# Patient Record
Sex: MALE | Race: WHITE | Employment: OTHER | URBAN - METROPOLITAN AREA
[De-identification: names, ages, dates, MRNs, and addresses within clinical notes are randomized per-mention and may not be internally consistent; named-entity substitution may affect disease eponyms.]

---

## 2017-04-07 LAB
LEFT VENTRICULAR EJECTION FRACTION MODE: NORMAL
LV EF: 65 %

## 2017-04-28 PROBLEM — I49.5 SINUS NODE DYSFUNCTION (HCC): Status: ACTIVE | Noted: 2017-04-28

## 2017-11-20 PROBLEM — Z95.0 PACEMAKER: Status: ACTIVE | Noted: 2017-11-20

## 2018-04-03 ENCOUNTER — OFFICE VISIT (OUTPATIENT)
Dept: ENT CLINIC | Age: 58
End: 2018-04-03
Payer: MEDICARE

## 2018-04-03 VITALS
WEIGHT: 271.1 LBS | HEART RATE: 93 BPM | BODY MASS INDEX: 33 KG/M2 | SYSTOLIC BLOOD PRESSURE: 133 MMHG | DIASTOLIC BLOOD PRESSURE: 87 MMHG

## 2018-04-03 DIAGNOSIS — G43.019 INTRACTABLE MIGRAINE WITHOUT AURA AND WITHOUT STATUS MIGRAINOSUS: ICD-10-CM

## 2018-04-03 DIAGNOSIS — J30.1 CHRONIC SEASONAL ALLERGIC RHINITIS DUE TO POLLEN: Primary | ICD-10-CM

## 2018-04-03 PROCEDURE — G8417 CALC BMI ABV UP PARAM F/U: HCPCS | Performed by: OTOLARYNGOLOGY

## 2018-04-03 PROCEDURE — 99213 OFFICE O/P EST LOW 20 MIN: CPT | Performed by: OTOLARYNGOLOGY

## 2018-04-03 PROCEDURE — G8427 DOCREV CUR MEDS BY ELIG CLIN: HCPCS | Performed by: OTOLARYNGOLOGY

## 2018-04-03 PROCEDURE — 4004F PT TOBACCO SCREEN RCVD TLK: CPT | Performed by: OTOLARYNGOLOGY

## 2018-04-03 PROCEDURE — 3017F COLORECTAL CA SCREEN DOC REV: CPT | Performed by: OTOLARYNGOLOGY

## 2018-04-03 RX ORDER — MONTELUKAST SODIUM 10 MG/1
10 TABLET ORAL NIGHTLY
Qty: 30 TABLET | Refills: 5 | Status: SHIPPED | OUTPATIENT
Start: 2018-04-03 | End: 2018-07-20 | Stop reason: SDUPTHER

## 2018-04-03 RX ORDER — AZELASTINE 1 MG/ML
2 SPRAY, METERED NASAL DAILY
Qty: 1 BOTTLE | Refills: 3 | Status: SHIPPED | OUTPATIENT
Start: 2018-04-03 | End: 2018-07-31 | Stop reason: SDUPTHER

## 2018-04-14 ASSESSMENT — ENCOUNTER SYMPTOMS
COUGH: 0
DOUBLE VISION: 0
VOMITING: 0
SHORTNESS OF BREATH: 0
HEARTBURN: 0
BLURRED VISION: 0

## 2018-05-08 ENCOUNTER — OFFICE VISIT (OUTPATIENT)
Dept: CARDIOLOGY CLINIC | Age: 58
End: 2018-05-08
Payer: MEDICARE

## 2018-05-08 VITALS
SYSTOLIC BLOOD PRESSURE: 100 MMHG | RESPIRATION RATE: 16 BRPM | BODY MASS INDEX: 32.63 KG/M2 | HEART RATE: 79 BPM | WEIGHT: 268 LBS | DIASTOLIC BLOOD PRESSURE: 72 MMHG | HEIGHT: 76 IN

## 2018-05-08 DIAGNOSIS — I48.0 PAROXYSMAL ATRIAL FIBRILLATION (HCC): Chronic | ICD-10-CM

## 2018-05-08 DIAGNOSIS — Z95.810 S/P ICD (INTERNAL CARDIAC DEFIBRILLATOR) PROCEDURE: Primary | Chronic | ICD-10-CM

## 2018-05-08 DIAGNOSIS — I48.3 TYPICAL ATRIAL FLUTTER (HCC): ICD-10-CM

## 2018-05-08 PROCEDURE — G8417 CALC BMI ABV UP PARAM F/U: HCPCS | Performed by: INTERNAL MEDICINE

## 2018-05-08 PROCEDURE — 4004F PT TOBACCO SCREEN RCVD TLK: CPT | Performed by: INTERNAL MEDICINE

## 2018-05-08 PROCEDURE — 3017F COLORECTAL CA SCREEN DOC REV: CPT | Performed by: INTERNAL MEDICINE

## 2018-05-08 PROCEDURE — G8427 DOCREV CUR MEDS BY ELIG CLIN: HCPCS | Performed by: INTERNAL MEDICINE

## 2018-05-08 PROCEDURE — 99213 OFFICE O/P EST LOW 20 MIN: CPT | Performed by: INTERNAL MEDICINE

## 2018-05-08 PROCEDURE — 93000 ELECTROCARDIOGRAM COMPLETE: CPT | Performed by: INTERNAL MEDICINE

## 2018-05-15 ENCOUNTER — NURSE ONLY (OUTPATIENT)
Dept: NON INVASIVE DIAGNOSTICS | Age: 58
End: 2018-05-15
Payer: MEDICARE

## 2018-05-15 DIAGNOSIS — Z95.0 PACEMAKER: Primary | ICD-10-CM

## 2018-05-15 DIAGNOSIS — I49.5 SINUS NODE DYSFUNCTION (HCC): ICD-10-CM

## 2018-05-15 PROCEDURE — 93296 REM INTERROG EVL PM/IDS: CPT | Performed by: INTERNAL MEDICINE

## 2018-05-15 PROCEDURE — 93294 REM INTERROG EVL PM/LDLS PM: CPT | Performed by: INTERNAL MEDICINE

## 2018-05-22 ENCOUNTER — TELEPHONE (OUTPATIENT)
Dept: NON INVASIVE DIAGNOSTICS | Age: 58
End: 2018-05-22

## 2018-06-01 DIAGNOSIS — K21.9 GASTROESOPHAGEAL REFLUX DISEASE, ESOPHAGITIS PRESENCE NOT SPECIFIED: ICD-10-CM

## 2018-06-01 RX ORDER — FAMOTIDINE 20 MG/1
20 TABLET, FILM COATED ORAL 2 TIMES DAILY
Qty: 60 TABLET | Refills: 1 | Status: SHIPPED | OUTPATIENT
Start: 2018-06-01 | End: 2018-07-31 | Stop reason: SDUPTHER

## 2018-07-03 ENCOUNTER — HOSPITAL ENCOUNTER (OUTPATIENT)
Age: 58
Discharge: HOME | End: 2018-07-03
Payer: MEDICARE

## 2018-07-03 LAB
CHOLESTEROL, TOTAL: 153 MG/DL (ref 0–199)
HDLC SERPL-MCNC: 32 MG/DL
LDL CHOLESTEROL CALCULATED: 86 MG/DL (ref 0–99)
TRIGL SERPL-MCNC: 175 MG/DL (ref 0–149)
VLDLC SERPL CALC-MCNC: 35 MG/DL

## 2018-07-03 PROCEDURE — 36415 COLL VENOUS BLD VENIPUNCTURE: CPT

## 2018-07-03 PROCEDURE — 80061 LIPID PANEL: CPT

## 2018-07-19 DIAGNOSIS — I42.0 DILATED CARDIOMYOPATHY (HCC): ICD-10-CM

## 2018-07-20 DIAGNOSIS — E11.8 TYPE 2 DIABETES MELLITUS WITH COMPLICATION, WITHOUT LONG-TERM CURRENT USE OF INSULIN (HCC): Chronic | ICD-10-CM

## 2018-07-20 DIAGNOSIS — E78.5 DYSLIPIDEMIA: ICD-10-CM

## 2018-07-20 DIAGNOSIS — G43.019 INTRACTABLE MIGRAINE WITHOUT AURA AND WITHOUT STATUS MIGRAINOSUS: ICD-10-CM

## 2018-07-20 DIAGNOSIS — I42.0 DILATED CARDIOMYOPATHY (HCC): ICD-10-CM

## 2018-07-20 RX ORDER — SPIRONOLACTONE 25 MG/1
25 TABLET ORAL DAILY
Qty: 12 TABLET | Refills: 0 | Status: SHIPPED | OUTPATIENT
Start: 2018-07-20 | End: 2018-07-31 | Stop reason: SDUPTHER

## 2018-07-20 RX ORDER — ATORVASTATIN CALCIUM 40 MG/1
40 TABLET, FILM COATED ORAL NIGHTLY
Qty: 12 TABLET | Refills: 0 | Status: SHIPPED | OUTPATIENT
Start: 2018-07-20 | End: 2018-07-31 | Stop reason: SDUPTHER

## 2018-07-20 RX ORDER — TOPIRAMATE 100 MG/1
100 TABLET, FILM COATED ORAL DAILY
Qty: 12 TABLET | Refills: 0 | Status: SHIPPED | OUTPATIENT
Start: 2018-07-20 | End: 2018-07-31 | Stop reason: SDUPTHER

## 2018-07-20 RX ORDER — LISINOPRIL 10 MG/1
10 TABLET ORAL DAILY
Qty: 12 TABLET | Refills: 0 | Status: SHIPPED | OUTPATIENT
Start: 2018-07-20 | End: 2018-07-31 | Stop reason: SDUPTHER

## 2018-07-20 RX ORDER — CARVEDILOL 12.5 MG/1
12.5 TABLET ORAL 2 TIMES DAILY WITH MEALS
Qty: 180 TABLET | Refills: 3 | Status: SHIPPED | OUTPATIENT
Start: 2018-07-20 | End: 2019-06-13 | Stop reason: SDUPTHER

## 2018-07-20 RX ORDER — MONTELUKAST SODIUM 10 MG/1
10 TABLET ORAL NIGHTLY
Qty: 12 TABLET | Refills: 0 | Status: SHIPPED | OUTPATIENT
Start: 2018-07-20 | End: 2018-07-31 | Stop reason: SDUPTHER

## 2018-07-27 NOTE — PROGRESS NOTES
Electrophysiology Outpatient Follow-Up Note    Sony Andres  1960  Date of Service: 7/30/2018  Referring Provider/PCP: Melo Padilla MD  Cardiology: Pooja Kwok MD  Electrophysiologist: Anupama Wilson DO    Patient Active Problem List    Diagnosis Date Noted    Typical atrial flutter ([facility] 03/28/2012     Priority: High     Overview Note:     1. Chronic anticoagulation with Coumadin  2. LASHAUN 12/2011: + CAROLA thrombus  3. DCCV 2/14/2012 and 1/25/2013: Sinus rhythm  4. Successful RF ablation of typical, counter-clockwise atrial flutter 12/13/2013  5. ECHO 5/7/2014: LVE, CLVH, normal wall motion, stage I diastolic dysfunction, limited study  6. Successful RF ablation of typical, counter-clockwise atrial flutter 12/13/2013  7. ECHO 5/7/2014: LVE, CLVH, normal wall motion, stage I diastolic dysfunction, limited study      S/P ICD (internal cardiac defibrillator) procedure 10/27/2011     Priority: High     Overview Note:     1. Medtronic [place], [place], model C194906, serial [id number] implanted 10/27/11  2. Right atrial lead is a Medtronic, model O6331117, serial [id number]  3. Right ventricular lead is a Medtronic, model Y1590679, serial [id number]  4. Successful RF ablation of typical, counter-clockwise atrial flutter 12/13/2013  5. ECHO 5/7/2014: LVE, CLVH, normal wall motion, stage I diastolic dysfunction, limited study          Cardiomyopathy, nonischemic ([facility] 10/06/2011     Priority: High     Overview Note:     1. ECHO 8/22/2011: EF=30%, RADHA   2. ECHO 7/6/2011: EF=25%, severe LVE, mild MR  3. MPS 7/8/2011: Normal perfusion, reduced EF  4. Cath 7/20/2011: Normal coronaries, EF=20%  5. Successful RF ablation of typical, counter-clockwise atrial flutter 12/13/2013  6. ECHO 5/7/2014: LVE, CLVH, normal wall motion, stage I diastolic dysfunction, LVEF of 60%.       Obesity 01/09/2013     Priority: Medium     Class: Chronic    Chronic headaches 01/09/2013     Priority: Medium     Class: Chronic  History of substance abuse 10/06/2011     Priority: Low     Overview Note:     A.  Abstinence since 1980's  B. H/O significant alcohol consumption      Pacemaker 11/20/2017    Sinus node dysfunction ([facility] 04/28/2017    Paroxysmal atrial fibrillation ([facility] 10/15/2015    Vertebrobasilar TIAs 10/15/2015     Overview Note:     2012      Type 2 diabetes mellitus without complication, without long-term current use of insulin (HCC) 04/08/2015    Allergic rhinitis 11/05/2014    Family history of colon cancer 07/21/2014       Current Outpatient Prescriptions   Medication Sig Dispense Refill    carvedilol (COREG) 12.5 MG tablet Take 1 tablet by mouth 2 times daily (with meals) 180 tablet 3    topiramate (TOPAMAX) 100 MG tablet Take 1 tablet by mouth daily 12 tablet 0    spironolactone (ALDACTONE) 25 MG tablet Take 1 tablet by mouth daily 12 tablet 0    montelukast (SINGULAIR) 10 MG tablet Take 1 tablet by mouth nightly 12 tablet 0    metFORMIN (GLUCOPHAGE) 500 MG tablet Take 1 tablet by mouth 2 times daily (with meals) 24 tablet 0    lisinopril (PRINIVIL;ZESTRIL) 10 MG tablet Take 1 tablet by mouth daily 12 tablet 0    atorvastatin (LIPITOR) 40 MG tablet Take 1 tablet by mouth nightly 12 tablet 0    furosemide (LASIX) 20 MG tablet TAKE ONE TABLET BY MOUTH EVERY DAY 30 tablet 0    DIGOX 250 MCG tablet Take 1 tablet by mouth daily 30 tablet 0    famotidine (PEPCID) 20 MG tablet Take 1 tablet by mouth 2 times daily 60 tablet 1    azelastine (ASTELIN) 0.1 % nasal spray 2 sprays by Nasal route daily Use in each nostril as directed 1 Bottle 3    aspirin 81 MG tablet Take 1 tablet by mouth daily 30 tablet 5    Elastic Bandages & Supports (KNEE BRACE) MISC Soft neutral position right knee brace  Size to fit  Dx:  Knee pain 1 each 0    albuterol (PROVENTIL) (2.5 MG/3ML) 0.083% nebulizer solution Take 3 mLs by nebulization every 6 hours as needed for Wheezing 120 each 3    albuterol sulfate HFA (PROVENTIL HFA) ecchymosis and no rash noted. Extremity: No clubbing or cyanosis. No edema. Psychiatric: Normal mood and affect. Thought content normal.  Pacemaker site: Well healed, no sign of infection/erosion. Device Interrogation/Reprogramming (7/30/18)  Make/Model Medtronic [address] GUTIERREZ. DOI 4/26/17  Mode MVP-R 70/130 ppm.   P wave: 2.9 mV  Impedance: 342 ohms   Threshold: 0.5 V @ 0.4 ms  RV R wave: 8.8 mV  Impedance: 304 ohms   Threshold: 1.0 V @ 0.4 ms  Pacing: A: 86.2%  RV: 1.2%    Battery Voltage/Longevity:  3.02 V, 8.5 years    Arrhythmias: None. Overall device function is normal  All device programmable settings were evaluated per above and in the scanned document, along with iterative adjustments (capture thresholds) to assess and select the most appropriate final programming to provide for consistent delivery of the appropriate therapy and to verify function of the device. I have reviewed all progress notes & records from the time of the patient's last office visit up to present. Stress test 1/11/17 Janette Lloyd)  - no ischemia  - EF 50%    TTE(4/3/2017):   Transthoracic Echocardiography Report (TTE)     Demographics      Patient Name       Dada Toussaint     Gender              Male                     [facility]      Medical Record     [id number]         Room Number         Osbaldo Marrufo      Account #         [de-identified]       Procedure Date      04/03/2017      Corporate ID                        Ordering Physician Curtis Lerma DO      Accession Number   [id number]        Referring Physician Uan Wiseman DO      Date of Birth      1960       Sonographer         [facility]      Age                56 year(s)       Interpreting       Lupe Arriola MD                                       Physician                                          Any Other     Procedure    Type of Study      TTE procedure:Echo Limited Study.     Procedure Date  Date: 04/03/2017 Start: 10:03 AM    Study Location: Echo PM   ----------------------------------------------------------------     M-Mode/2D Measurements & Calculations      LV Diastolic      LV Systolic Dimension: 3.8 cm   Dimension: 5.6 cm LV Volume Diastolic: 252.8 ml   LV EM:05.8 %      LV Volume Systolic: 82.6 ml   LV PW Diastolic:  LV EDV/LV EDV Index: 100.1 VU/09   RV Diastolic   4.8 cm            ml/m^2LV ESV/LV ESV Index: 29.7    Dimension: 2.7 cm   LV PW Systolic:   EP/00RL/ m^2   1.4 cm            EF Calculated: 70.3 %   Septum Diastolic: LV Mass Index: 55 l/min*m^2        LA volume/Index: 60.1   0.7 cm                                               ml /73ZQ/B^1   Septum Systolic:                                     RA Area: 17.1 cm^2   1.4 cm            LVOT: 1.8 cm      LV Mass: 139.86 g     Doppler Measurements & Calculations      MV Peak E-Wave: 0.62 m/s   MV Peak A-Wave: 0.8 m/s   MV E/A Ratio: 0.77                                                    TR Velocity:2.09 m/s   MV Deceleration Time: 254 msec                TR Gradient:17.44 mmHg      MV E' Septal Velocity: 0.08 m/s   MV E' Lateral Velocity: 0.09 m/s      Impressions:     1). NICMP  1. ECHO 8/22/2011: EF=30%, RADHA   2. ECHO 7/6/2011: EF=25%, severe LVE, mild MR  3. MPS 7/8/2011: Normal perfusion, reduced EF  4. Cath 7/20/2011: Normal coronaries, EF=20%  5. Successful RF ablation of typical, counter-clockwise atrial flutter 12/13/2013  6. ECHO 5/7/2014: LVE, CLVH, normal wall motion, stage I diastolic dysfunction, LVEF of 60%. 7. Echo 4/2017: LVEF--65%    2). H/O paroxysmal atrial flutter  1. Chronic anticoagulation with Coumadin  2. LASHAUN 12/2011: + CAROLA thrombus  3. DCCV 2/14/2012 and 1/25/2013: Sinus rhythm  4. Successful RF ablation of typical, counter-clockwise atrial flutter 12/13/2013  5. ECHO 5/7/2014: LVE, CLVH, normal wall motion, stage I diastolic dysfunction, limited study  6. Successful RF ablation of typical, counter-clockwise atrial flutter 12/13/2013  7.  ECHO 5/7/2014: Lucas Conner,

## 2018-07-30 ENCOUNTER — OFFICE VISIT (OUTPATIENT)
Dept: NON INVASIVE DIAGNOSTICS | Age: 58
End: 2018-07-30
Payer: MEDICARE

## 2018-07-30 VITALS
BODY MASS INDEX: 32.15 KG/M2 | RESPIRATION RATE: 18 BRPM | WEIGHT: 264 LBS | HEIGHT: 76 IN | DIASTOLIC BLOOD PRESSURE: 74 MMHG | HEART RATE: 80 BPM | SYSTOLIC BLOOD PRESSURE: 104 MMHG

## 2018-07-30 DIAGNOSIS — Z95.0 PACEMAKER: Primary | ICD-10-CM

## 2018-07-30 PROCEDURE — 3017F COLORECTAL CA SCREEN DOC REV: CPT | Performed by: INTERNAL MEDICINE

## 2018-07-30 PROCEDURE — 99213 OFFICE O/P EST LOW 20 MIN: CPT | Performed by: INTERNAL MEDICINE

## 2018-07-30 PROCEDURE — 4004F PT TOBACCO SCREEN RCVD TLK: CPT | Performed by: INTERNAL MEDICINE

## 2018-07-30 PROCEDURE — 93280 PM DEVICE PROGR EVAL DUAL: CPT | Performed by: INTERNAL MEDICINE

## 2018-07-30 PROCEDURE — G8427 DOCREV CUR MEDS BY ELIG CLIN: HCPCS | Performed by: INTERNAL MEDICINE

## 2018-07-30 PROCEDURE — G8417 CALC BMI ABV UP PARAM F/U: HCPCS | Performed by: INTERNAL MEDICINE

## 2018-07-30 ASSESSMENT — ENCOUNTER SYMPTOMS
SHORTNESS OF BREATH: 0
WHEEZING: 0
COUGH: 0
ORTHOPNEA: 0

## 2018-07-31 ENCOUNTER — OFFICE VISIT (OUTPATIENT)
Dept: INTERNAL MEDICINE | Age: 58
End: 2018-07-31
Payer: MEDICARE

## 2018-07-31 VITALS
HEIGHT: 76 IN | TEMPERATURE: 98.2 F | HEART RATE: 79 BPM | RESPIRATION RATE: 16 BRPM | BODY MASS INDEX: 32.06 KG/M2 | DIASTOLIC BLOOD PRESSURE: 85 MMHG | WEIGHT: 263.3 LBS | SYSTOLIC BLOOD PRESSURE: 128 MMHG

## 2018-07-31 DIAGNOSIS — J40 BRONCHITIS: ICD-10-CM

## 2018-07-31 DIAGNOSIS — E11.9 TYPE 2 DIABETES MELLITUS WITHOUT COMPLICATION, WITHOUT LONG-TERM CURRENT USE OF INSULIN (HCC): ICD-10-CM

## 2018-07-31 DIAGNOSIS — E78.5 DYSLIPIDEMIA: ICD-10-CM

## 2018-07-31 DIAGNOSIS — E11.8 TYPE 2 DIABETES MELLITUS WITH COMPLICATION, WITHOUT LONG-TERM CURRENT USE OF INSULIN (HCC): Chronic | ICD-10-CM

## 2018-07-31 DIAGNOSIS — G43.019 INTRACTABLE MIGRAINE WITHOUT AURA AND WITHOUT STATUS MIGRAINOSUS: ICD-10-CM

## 2018-07-31 DIAGNOSIS — K21.9 GASTROESOPHAGEAL REFLUX DISEASE, ESOPHAGITIS PRESENCE NOT SPECIFIED: ICD-10-CM

## 2018-07-31 DIAGNOSIS — Z23 NEED FOR PROPHYLACTIC VACCINATION AGAINST DIPHTHERIA-TETANUS-PERTUSSIS (DTP): ICD-10-CM

## 2018-07-31 DIAGNOSIS — I42.0 DILATED CARDIOMYOPATHY (HCC): ICD-10-CM

## 2018-07-31 DIAGNOSIS — Z23 NEED FOR PROPHYLACTIC VACCINATION AND INOCULATION AGAINST VARICELLA: ICD-10-CM

## 2018-07-31 LAB — HBA1C MFR BLD: 6.5 %

## 2018-07-31 PROCEDURE — G8427 DOCREV CUR MEDS BY ELIG CLIN: HCPCS | Performed by: STUDENT IN AN ORGANIZED HEALTH CARE EDUCATION/TRAINING PROGRAM

## 2018-07-31 PROCEDURE — 2022F DILAT RTA XM EVC RTNOPTHY: CPT | Performed by: STUDENT IN AN ORGANIZED HEALTH CARE EDUCATION/TRAINING PROGRAM

## 2018-07-31 PROCEDURE — 83036 HEMOGLOBIN GLYCOSYLATED A1C: CPT | Performed by: STUDENT IN AN ORGANIZED HEALTH CARE EDUCATION/TRAINING PROGRAM

## 2018-07-31 PROCEDURE — 99212 OFFICE O/P EST SF 10 MIN: CPT | Performed by: STUDENT IN AN ORGANIZED HEALTH CARE EDUCATION/TRAINING PROGRAM

## 2018-07-31 PROCEDURE — G8417 CALC BMI ABV UP PARAM F/U: HCPCS | Performed by: STUDENT IN AN ORGANIZED HEALTH CARE EDUCATION/TRAINING PROGRAM

## 2018-07-31 PROCEDURE — 4004F PT TOBACCO SCREEN RCVD TLK: CPT | Performed by: STUDENT IN AN ORGANIZED HEALTH CARE EDUCATION/TRAINING PROGRAM

## 2018-07-31 PROCEDURE — 99213 OFFICE O/P EST LOW 20 MIN: CPT | Performed by: STUDENT IN AN ORGANIZED HEALTH CARE EDUCATION/TRAINING PROGRAM

## 2018-07-31 PROCEDURE — 3017F COLORECTAL CA SCREEN DOC REV: CPT | Performed by: STUDENT IN AN ORGANIZED HEALTH CARE EDUCATION/TRAINING PROGRAM

## 2018-07-31 PROCEDURE — 3044F HG A1C LEVEL LT 7.0%: CPT | Performed by: STUDENT IN AN ORGANIZED HEALTH CARE EDUCATION/TRAINING PROGRAM

## 2018-07-31 RX ORDER — FUROSEMIDE 20 MG/1
20 TABLET ORAL DAILY
Qty: 30 TABLET | Refills: 0 | Status: SHIPPED | OUTPATIENT
Start: 2018-07-31 | End: 2018-09-26 | Stop reason: SDUPTHER

## 2018-07-31 RX ORDER — FAMOTIDINE 20 MG/1
20 TABLET, FILM COATED ORAL 2 TIMES DAILY
Qty: 60 TABLET | Refills: 5 | Status: SHIPPED | OUTPATIENT
Start: 2018-07-31 | End: 2018-12-19 | Stop reason: SDUPTHER

## 2018-07-31 RX ORDER — AZELASTINE 1 MG/ML
2 SPRAY, METERED NASAL DAILY
Qty: 1 BOTTLE | Refills: 5 | Status: SHIPPED | OUTPATIENT
Start: 2018-07-31 | End: 2019-04-03 | Stop reason: SDUPTHER

## 2018-07-31 RX ORDER — SPIRONOLACTONE 25 MG/1
25 TABLET ORAL DAILY
Qty: 30 TABLET | Refills: 5 | Status: SHIPPED | OUTPATIENT
Start: 2018-07-31 | End: 2018-12-19 | Stop reason: SDUPTHER

## 2018-07-31 RX ORDER — LISINOPRIL 10 MG/1
10 TABLET ORAL DAILY
Qty: 30 TABLET | Refills: 3 | Status: SHIPPED | OUTPATIENT
Start: 2018-07-31 | End: 2018-12-19 | Stop reason: SDUPTHER

## 2018-07-31 RX ORDER — TOPIRAMATE 100 MG/1
100 TABLET, FILM COATED ORAL DAILY
Qty: 30 TABLET | Refills: 5 | Status: SHIPPED | OUTPATIENT
Start: 2018-07-31 | End: 2018-12-19 | Stop reason: SDUPTHER

## 2018-07-31 RX ORDER — MONTELUKAST SODIUM 10 MG/1
10 TABLET ORAL NIGHTLY
Qty: 30 TABLET | Refills: 5 | Status: SHIPPED | OUTPATIENT
Start: 2018-07-31 | End: 2018-12-19 | Stop reason: SDUPTHER

## 2018-07-31 RX ORDER — DIGOXIN 250 MCG
250 TABLET ORAL DAILY
Qty: 30 TABLET | Refills: 5 | Status: SHIPPED | OUTPATIENT
Start: 2018-07-31 | End: 2018-08-22 | Stop reason: SDUPTHER

## 2018-07-31 RX ORDER — IBUPROFEN 200 MG
600 TABLET ORAL DAILY PRN
COMMUNITY
End: 2019-01-31

## 2018-07-31 RX ORDER — ATORVASTATIN CALCIUM 40 MG/1
80 TABLET, FILM COATED ORAL NIGHTLY
Qty: 30 TABLET | Refills: 5 | Status: SHIPPED | OUTPATIENT
Start: 2018-07-31 | End: 2018-12-19 | Stop reason: SDUPTHER

## 2018-07-31 ASSESSMENT — PATIENT HEALTH QUESTIONNAIRE - PHQ9
2. FEELING DOWN, DEPRESSED OR HOPELESS: 0
1. LITTLE INTEREST OR PLEASURE IN DOING THINGS: 0
SUM OF ALL RESPONSES TO PHQ9 QUESTIONS 1 & 2: 0
SUM OF ALL RESPONSES TO PHQ QUESTIONS 1-9: 0

## 2018-07-31 NOTE — PROGRESS NOTES
[facility]  Internal Medicine Clinic    Attending Physician Statement:    I have discussed the case, including pertinent history and exam findings with the resident. I agree with the assessment, plan and orders as documented by the resident. Patient here for routine follow up of medical problems. PAF SP ablation - per EP no AOC post ablation if he stays in SNR - continue same. HFpEF - ASA - and GDMT continue same since he is at goal.     Remainder of medical problems as per resident note.   Sumit Lamb D.O.  8/2/2018 3:01 PM
maintenance   -HIV screen and Hep C negative   -Diabetic foot exam today WNL   -Flu shot today   -Diabetic eye exam; [facility];  Obtain records  -colonoscopy in 2016 upto date   -Urine microalbuminuria not present 12/21/17  -CMP  -Shingles vaccine and Tdap script provided     Kiah Shaw M.D., PGY 3  7/31/2018    Supervised by: Dr. Godfrey Genera

## 2018-07-31 NOTE — PATIENT INSTRUCTIONS
that will allow you to sit while showering. · Get into a tub or shower by putting the weaker leg in first. Get out of a tub or shower with your strong side first.  · Repair loose toilet seats and consider installing a raised toilet seat to make getting on and off the toilet easier. · Keep your bathroom door unlocked while you are in the shower. Where can you learn more? Go to [email or web address] org and sign in to your [employer] account. Enter [phone] in the [employer] box to learn more about \"Preventing Falls: Care Instructions. \"     If you do not have an account, please click on the \"Sign Up Now\" link. Current as of: May 12, 2017  Content Version: 11.6  © 2677-7234 [employer]. Care instructions adapted under license by [facility] ([facility]). If you have questions about a medical condition or this instruction, always ask your healthcare professional. [place] [address] any warranty or liability for your use of this information. Be compliant with your medications  Follow up in clinic in 6 months.      Electronically signed by Mckenzie Cordova MD on 7/31/2018 at 10:07 AM

## 2018-08-22 DIAGNOSIS — I42.0 DILATED CARDIOMYOPATHY (HCC): ICD-10-CM

## 2018-08-23 RX ORDER — DIGOXIN 250 MCG
250 TABLET ORAL DAILY
Qty: 90 TABLET | Refills: 0 | Status: SHIPPED | OUTPATIENT
Start: 2018-08-23 | End: 2019-01-30 | Stop reason: SDUPTHER

## 2018-09-26 DIAGNOSIS — I42.0 DILATED CARDIOMYOPATHY (HCC): ICD-10-CM

## 2018-09-27 RX ORDER — FUROSEMIDE 20 MG/1
20 TABLET ORAL DAILY
Qty: 30 TABLET | Refills: 6 | Status: SHIPPED | OUTPATIENT
Start: 2018-09-27 | End: 2018-12-19 | Stop reason: SDUPTHER

## 2018-10-29 ENCOUNTER — NURSE ONLY (OUTPATIENT)
Dept: NON INVASIVE DIAGNOSTICS | Age: 58
End: 2018-10-29
Payer: MEDICARE

## 2018-10-29 DIAGNOSIS — Z95.0 PACEMAKER: Primary | ICD-10-CM

## 2018-10-29 DIAGNOSIS — I49.5 SINUS NODE DYSFUNCTION (HCC): ICD-10-CM

## 2018-10-29 PROCEDURE — 93296 REM INTERROG EVL PM/IDS: CPT | Performed by: INTERNAL MEDICINE

## 2018-10-29 PROCEDURE — 93294 REM INTERROG EVL PM/LDLS PM: CPT | Performed by: INTERNAL MEDICINE

## 2018-10-30 NOTE — PROGRESS NOTES
See [employer] [place] report. Remote monitoring reviewed over a 90 day period. End of 90 day monitoring period date of service 10.29.2018.

## 2018-10-31 ENCOUNTER — TELEPHONE (OUTPATIENT)
Dept: NON INVASIVE DIAGNOSTICS | Age: 58
End: 2018-10-31

## 2018-12-13 ENCOUNTER — HOSPITAL ENCOUNTER (OUTPATIENT)
Age: 58
End: 2018-12-13
Payer: MEDICARE

## 2018-12-13 ENCOUNTER — HOSPITAL ENCOUNTER (OUTPATIENT)
Age: 58
Discharge: HOME | End: 2018-12-13
Payer: MEDICARE

## 2018-12-13 DIAGNOSIS — E11.8 TYPE 2 DIABETES MELLITUS WITH COMPLICATION, WITHOUT LONG-TERM CURRENT USE OF INSULIN (HCC): Chronic | ICD-10-CM

## 2018-12-13 LAB
ALBUMIN SERPL-MCNC: 4.2 G/DL (ref 3.5–5.2)
ALP BLD-CCNC: 41 U/L (ref 40–129)
ALT SERPL-CCNC: 26 U/L (ref 0–40)
ANION GAP SERPL CALCULATED.3IONS-SCNC: 13 MMOL/L (ref 7–16)
AST SERPL-CCNC: 20 U/L (ref 0–39)
BILIRUB SERPL-MCNC: 0.4 MG/DL (ref 0–1.2)
BUN BLDV-MCNC: 18 MG/DL (ref 6–20)
CALCIUM SERPL-MCNC: 9.3 MG/DL (ref 8.6–10.2)
CHLORIDE BLD-SCNC: 103 MMOL/L (ref 98–107)
CO2: 23 MMOL/L (ref 22–29)
CREAT SERPL-MCNC: 1.3 MG/DL (ref 0.7–1.2)
GFR AFRICAN AMERICAN: >60
GFR NON-AFRICAN AMERICAN: 56 ML/MIN/1.73
GLUCOSE BLD-MCNC: 144 MG/DL (ref 74–99)
POTASSIUM SERPL-SCNC: 4.4 MMOL/L (ref 3.5–5)
SODIUM BLD-SCNC: 139 MMOL/L (ref 132–146)
TOTAL PROTEIN: 6.6 G/DL (ref 6.4–8.3)

## 2018-12-13 PROCEDURE — 36415 COLL VENOUS BLD VENIPUNCTURE: CPT

## 2018-12-13 PROCEDURE — 80053 COMPREHEN METABOLIC PANEL: CPT

## 2018-12-19 ENCOUNTER — OFFICE VISIT (OUTPATIENT)
Dept: INTERNAL MEDICINE | Age: 58
End: 2018-12-19
Payer: MEDICARE

## 2018-12-19 ENCOUNTER — HOSPITAL ENCOUNTER (OUTPATIENT)
Age: 58
Discharge: HOME | End: 2018-12-19
Payer: MEDICARE

## 2018-12-19 VITALS
OXYGEN SATURATION: 100 % | WEIGHT: 264 LBS | HEIGHT: 76 IN | SYSTOLIC BLOOD PRESSURE: 117 MMHG | DIASTOLIC BLOOD PRESSURE: 81 MMHG | HEART RATE: 71 BPM | TEMPERATURE: 98.1 F | BODY MASS INDEX: 32.15 KG/M2

## 2018-12-19 DIAGNOSIS — E78.5 DYSLIPIDEMIA: ICD-10-CM

## 2018-12-19 DIAGNOSIS — G43.019 INTRACTABLE MIGRAINE WITHOUT AURA AND WITHOUT STATUS MIGRAINOSUS: ICD-10-CM

## 2018-12-19 DIAGNOSIS — E11.8 TYPE 2 DIABETES MELLITUS WITH COMPLICATION, WITHOUT LONG-TERM CURRENT USE OF INSULIN (HCC): Chronic | ICD-10-CM

## 2018-12-19 DIAGNOSIS — I42.0 DILATED CARDIOMYOPATHY (HCC): ICD-10-CM

## 2018-12-19 DIAGNOSIS — K21.9 GASTROESOPHAGEAL REFLUX DISEASE, ESOPHAGITIS PRESENCE NOT SPECIFIED: ICD-10-CM

## 2018-12-19 LAB
ALBUMIN SERPL-MCNC: 4.3 G/DL (ref 3.5–5.2)
ALP BLD-CCNC: 42 U/L (ref 40–129)
ALT SERPL-CCNC: 27 U/L (ref 0–40)
ANION GAP SERPL CALCULATED.3IONS-SCNC: 12 MMOL/L (ref 7–16)
AST SERPL-CCNC: 21 U/L (ref 0–39)
BILIRUB SERPL-MCNC: 0.3 MG/DL (ref 0–1.2)
BUN BLDV-MCNC: 16 MG/DL (ref 6–20)
CALCIUM SERPL-MCNC: 9.1 MG/DL (ref 8.6–10.2)
CHLORIDE BLD-SCNC: 103 MMOL/L (ref 98–107)
CHOLESTEROL, TOTAL: 153 MG/DL (ref 0–199)
CO2: 23 MMOL/L (ref 22–29)
CREAT SERPL-MCNC: 1.2 MG/DL (ref 0.7–1.2)
CREATININE URINE: 68 MG/DL (ref 40–278)
DIGOXIN LEVEL: 0.7 NG/ML (ref 0.8–2)
GFR AFRICAN AMERICAN: >60
GFR NON-AFRICAN AMERICAN: >60 ML/MIN/1.73
GLUCOSE BLD-MCNC: 114 MG/DL (ref 74–99)
HBA1C MFR BLD: 6.7 % (ref 4–5.6)
HDLC SERPL-MCNC: 35 MG/DL
LDL CHOLESTEROL CALCULATED: 95 MG/DL (ref 0–99)
MICROALBUMIN UR-MCNC: <12 MG/L
MICROALBUMIN/CREAT UR-RTO: ABNORMAL (ref 0–30)
POTASSIUM SERPL-SCNC: 4.4 MMOL/L (ref 3.5–5)
SODIUM BLD-SCNC: 138 MMOL/L (ref 132–146)
TOTAL PROTEIN: 6.8 G/DL (ref 6.4–8.3)
TRIGL SERPL-MCNC: 116 MG/DL (ref 0–149)
VLDLC SERPL CALC-MCNC: 23 MG/DL

## 2018-12-19 PROCEDURE — 2022F DILAT RTA XM EVC RTNOPTHY: CPT | Performed by: STUDENT IN AN ORGANIZED HEALTH CARE EDUCATION/TRAINING PROGRAM

## 2018-12-19 PROCEDURE — 80053 COMPREHEN METABOLIC PANEL: CPT

## 2018-12-19 PROCEDURE — 99213 OFFICE O/P EST LOW 20 MIN: CPT | Performed by: STUDENT IN AN ORGANIZED HEALTH CARE EDUCATION/TRAINING PROGRAM

## 2018-12-19 PROCEDURE — G8427 DOCREV CUR MEDS BY ELIG CLIN: HCPCS | Performed by: STUDENT IN AN ORGANIZED HEALTH CARE EDUCATION/TRAINING PROGRAM

## 2018-12-19 PROCEDURE — 4004F PT TOBACCO SCREEN RCVD TLK: CPT | Performed by: STUDENT IN AN ORGANIZED HEALTH CARE EDUCATION/TRAINING PROGRAM

## 2018-12-19 PROCEDURE — 3044F HG A1C LEVEL LT 7.0%: CPT | Performed by: STUDENT IN AN ORGANIZED HEALTH CARE EDUCATION/TRAINING PROGRAM

## 2018-12-19 PROCEDURE — 82044 UR ALBUMIN SEMIQUANTITATIVE: CPT

## 2018-12-19 PROCEDURE — 82570 ASSAY OF URINE CREATININE: CPT

## 2018-12-19 PROCEDURE — G8417 CALC BMI ABV UP PARAM F/U: HCPCS | Performed by: STUDENT IN AN ORGANIZED HEALTH CARE EDUCATION/TRAINING PROGRAM

## 2018-12-19 PROCEDURE — G8482 FLU IMMUNIZE ORDER/ADMIN: HCPCS | Performed by: STUDENT IN AN ORGANIZED HEALTH CARE EDUCATION/TRAINING PROGRAM

## 2018-12-19 PROCEDURE — 80162 ASSAY OF DIGOXIN TOTAL: CPT

## 2018-12-19 PROCEDURE — 36415 COLL VENOUS BLD VENIPUNCTURE: CPT

## 2018-12-19 PROCEDURE — 3017F COLORECTAL CA SCREEN DOC REV: CPT | Performed by: STUDENT IN AN ORGANIZED HEALTH CARE EDUCATION/TRAINING PROGRAM

## 2018-12-19 PROCEDURE — 83036 HEMOGLOBIN GLYCOSYLATED A1C: CPT

## 2018-12-19 PROCEDURE — 80061 LIPID PANEL: CPT

## 2018-12-19 RX ORDER — MONTELUKAST SODIUM 10 MG/1
10 TABLET ORAL NIGHTLY
Qty: 30 TABLET | Refills: 5 | Status: SHIPPED | OUTPATIENT
Start: 2018-12-19 | End: 2019-04-03 | Stop reason: SDUPTHER

## 2018-12-19 RX ORDER — SPIRONOLACTONE 25 MG/1
25 TABLET ORAL DAILY
Qty: 30 TABLET | Refills: 5 | Status: SHIPPED | OUTPATIENT
Start: 2018-12-19 | End: 2019-08-01 | Stop reason: SDUPTHER

## 2018-12-19 RX ORDER — FAMOTIDINE 20 MG/1
20 TABLET, FILM COATED ORAL 2 TIMES DAILY
Qty: 60 TABLET | Refills: 5 | Status: SHIPPED | OUTPATIENT
Start: 2018-12-19 | End: 2019-01-31 | Stop reason: SDUPTHER

## 2018-12-19 RX ORDER — FUROSEMIDE 20 MG/1
20 TABLET ORAL DAILY
Qty: 30 TABLET | Refills: 6 | Status: SHIPPED | OUTPATIENT
Start: 2018-12-19 | End: 2019-08-01 | Stop reason: SDUPTHER

## 2018-12-19 RX ORDER — TOPIRAMATE 100 MG/1
100 TABLET, FILM COATED ORAL DAILY
Qty: 30 TABLET | Refills: 5 | Status: SHIPPED | OUTPATIENT
Start: 2018-12-19 | End: 2019-01-31 | Stop reason: SDUPTHER

## 2018-12-19 RX ORDER — LISINOPRIL 10 MG/1
10 TABLET ORAL DAILY
Qty: 30 TABLET | Refills: 3 | Status: SHIPPED | OUTPATIENT
Start: 2018-12-19 | End: 2019-04-09 | Stop reason: SDUPTHER

## 2018-12-19 RX ORDER — ATORVASTATIN CALCIUM 40 MG/1
80 TABLET, FILM COATED ORAL NIGHTLY
Qty: 30 TABLET | Refills: 5 | Status: SHIPPED | OUTPATIENT
Start: 2018-12-19 | End: 2019-08-01 | Stop reason: SDUPTHER

## 2018-12-19 ASSESSMENT — PATIENT HEALTH QUESTIONNAIRE - PHQ9
SUM OF ALL RESPONSES TO PHQ9 QUESTIONS 1 & 2: 0
1. LITTLE INTEREST OR PLEASURE IN DOING THINGS: 0
SUM OF ALL RESPONSES TO PHQ QUESTIONS 1-9: 0
2. FEELING DOWN, DEPRESSED OR HOPELESS: 0
SUM OF ALL RESPONSES TO PHQ QUESTIONS 1-9: 0

## 2018-12-19 NOTE — PROGRESS NOTES
Clinic Note  Internal Medicine Residency    Subjective :   Oscar Denny is a 60 y.o. male, comes to the clinic for regular follow up. He has a PMHx of NICMP S/P dual chamber pacemaker, Dyslipidemia, Asthma, Migraine, Hx of TIA, PAF s/p AV kamar ablation, DM. His Carelink monitor was interrogated and he was noted to have 78 minutes of AF/AFL. However, he is not on anticoagulation at this time. He has no chest pain, palpitations, SOB, ORTIZ, PND, orthopnea or leg swelling. He does intermittently feel \"twinges\" in his chest but says he felt them even before he was diagnosed with AF. Review of Systems   Constitutional: No fever, chills, weight loss or gain, night sweats  Respiratory: No cough, dyspnea, wheezing, sputum, or hemoptysis  Cardiovascular: No chest pain, angina, dyspnea on exertion, orthopnea, PND Gastrointestinal: No nausea, vomiting, diarrhea, abdominal pain, or blood per rectum  Genitourinary: No dysuria, nocturia, hesitancy, or incontinence    Objective:   Physical Exam   /81   Pulse 71   Temp 98.1 °F (36.7 °C) (Oral)   Ht 6' 4\" (1.93 m)   Wt 264 lb (119.7 kg)   SpO2 100%   BMI 32.14 kg/m²   General appearance: Alert, Awake, Oriented to Person, Place, and Time   Head: Normocephalic. No masses, lesions or tenderness noted. Eyes: Conjunctivae appear normal, EOMI, PERRL. No scleral icterus or drainage. Ears: External ears normal, no otalgia or erythema. Nose/Sinuses: Nares normal. Septum midline. Mucosa normal. No drainage  Oropharynx: Oropharynx clear with no exudate seen  Neck: Neck supple. No jugular venous distension, lymphadenopathy or thyromegaly. Trachea midline  Lungs: Lungs clear to auscultation bilaterally. No rhonchi, crackles or wheezes  Heart:  Regular rate and rhythm with auscultation of S1, S2. No rub, murmur or gallop  Abdomen: Soft, non-tender abdomen with bowel sounds in four quadrants. No hepatosplenomegaly or masses.    Extremities: No edema, Peripheral pulses    Smoking  -Smokes 1PPD since 27+ years   -Pre contemplative at this time   -Currently vaping      Health care maintenance   -HIV screen and Hep C negative   -Diabetic foot exam today WNL; he does have calluses without any nidus for infection. Discuss podiatry referral but he is refusing at this time.  -Flu shot and Tdap today   -Diabetic eye exam; [facility];  Obtain records  -colonoscopy in 2016 upto date   -Urine microalbuminuria recheck today       Estelita Berrios M.D., PGY 3  12/19/2018    Supervised by: Dr. Cathy Sim

## 2018-12-20 ENCOUNTER — TELEPHONE (OUTPATIENT)
Dept: NON INVASIVE DIAGNOSTICS | Age: 58
End: 2018-12-20

## 2019-01-28 ENCOUNTER — NURSE ONLY (OUTPATIENT)
Dept: NON INVASIVE DIAGNOSTICS | Age: 59
End: 2019-01-28
Payer: MEDICARE

## 2019-01-28 DIAGNOSIS — Z95.0 PACEMAKER: Primary | ICD-10-CM

## 2019-01-28 DIAGNOSIS — I49.5 SINUS NODE DYSFUNCTION (HCC): ICD-10-CM

## 2019-01-28 PROCEDURE — 93294 REM INTERROG EVL PM/LDLS PM: CPT | Performed by: INTERNAL MEDICINE

## 2019-01-28 PROCEDURE — 93296 REM INTERROG EVL PM/IDS: CPT | Performed by: INTERNAL MEDICINE

## 2019-01-29 ENCOUNTER — TELEPHONE (OUTPATIENT)
Dept: NON INVASIVE DIAGNOSTICS | Age: 59
End: 2019-01-29

## 2019-01-30 DIAGNOSIS — I42.0 DILATED CARDIOMYOPATHY (HCC): ICD-10-CM

## 2019-01-30 RX ORDER — DIGOXIN 250 MCG
250 TABLET ORAL DAILY
Qty: 90 TABLET | Refills: 3 | Status: SHIPPED | OUTPATIENT
Start: 2019-01-30 | End: 2019-06-03 | Stop reason: SDUPTHER

## 2019-01-31 ENCOUNTER — OFFICE VISIT (OUTPATIENT)
Dept: INTERNAL MEDICINE | Age: 59
End: 2019-01-31
Payer: MEDICARE

## 2019-01-31 VITALS
TEMPERATURE: 98.4 F | WEIGHT: 265 LBS | HEART RATE: 74 BPM | HEIGHT: 76 IN | BODY MASS INDEX: 32.27 KG/M2 | RESPIRATION RATE: 18 BRPM | SYSTOLIC BLOOD PRESSURE: 125 MMHG | DIASTOLIC BLOOD PRESSURE: 81 MMHG

## 2019-01-31 DIAGNOSIS — E11.8 TYPE 2 DIABETES MELLITUS WITH COMPLICATION, WITHOUT LONG-TERM CURRENT USE OF INSULIN (HCC): Chronic | ICD-10-CM

## 2019-01-31 DIAGNOSIS — K21.9 GASTROESOPHAGEAL REFLUX DISEASE, ESOPHAGITIS PRESENCE NOT SPECIFIED: ICD-10-CM

## 2019-01-31 DIAGNOSIS — I48.3 TYPICAL ATRIAL FLUTTER (HCC): Primary | ICD-10-CM

## 2019-01-31 DIAGNOSIS — E66.09 CLASS 1 OBESITY DUE TO EXCESS CALORIES WITH BODY MASS INDEX (BMI) OF 32.0 TO 32.9 IN ADULT, UNSPECIFIED WHETHER SERIOUS COMORBIDITY PRESENT: Chronic | ICD-10-CM

## 2019-01-31 DIAGNOSIS — E78.5 DYSLIPIDEMIA: ICD-10-CM

## 2019-01-31 DIAGNOSIS — J40 BRONCHITIS: ICD-10-CM

## 2019-01-31 DIAGNOSIS — G43.019 INTRACTABLE MIGRAINE WITHOUT AURA AND WITHOUT STATUS MIGRAINOSUS: ICD-10-CM

## 2019-01-31 DIAGNOSIS — Z95.810 S/P ICD (INTERNAL CARDIAC DEFIBRILLATOR) PROCEDURE: Chronic | ICD-10-CM

## 2019-01-31 PROCEDURE — 6360000002 HC RX W HCPCS

## 2019-01-31 PROCEDURE — 99212 OFFICE O/P EST SF 10 MIN: CPT | Performed by: STUDENT IN AN ORGANIZED HEALTH CARE EDUCATION/TRAINING PROGRAM

## 2019-01-31 PROCEDURE — G8427 DOCREV CUR MEDS BY ELIG CLIN: HCPCS | Performed by: STUDENT IN AN ORGANIZED HEALTH CARE EDUCATION/TRAINING PROGRAM

## 2019-01-31 PROCEDURE — 3017F COLORECTAL CA SCREEN DOC REV: CPT | Performed by: STUDENT IN AN ORGANIZED HEALTH CARE EDUCATION/TRAINING PROGRAM

## 2019-01-31 PROCEDURE — G8417 CALC BMI ABV UP PARAM F/U: HCPCS | Performed by: STUDENT IN AN ORGANIZED HEALTH CARE EDUCATION/TRAINING PROGRAM

## 2019-01-31 PROCEDURE — 3046F HEMOGLOBIN A1C LEVEL >9.0%: CPT | Performed by: STUDENT IN AN ORGANIZED HEALTH CARE EDUCATION/TRAINING PROGRAM

## 2019-01-31 PROCEDURE — G0008 ADMIN INFLUENZA VIRUS VAC: HCPCS

## 2019-01-31 PROCEDURE — 90686 IIV4 VACC NO PRSV 0.5 ML IM: CPT

## 2019-01-31 PROCEDURE — 99213 OFFICE O/P EST LOW 20 MIN: CPT | Performed by: STUDENT IN AN ORGANIZED HEALTH CARE EDUCATION/TRAINING PROGRAM

## 2019-01-31 PROCEDURE — 2022F DILAT RTA XM EVC RTNOPTHY: CPT | Performed by: STUDENT IN AN ORGANIZED HEALTH CARE EDUCATION/TRAINING PROGRAM

## 2019-01-31 PROCEDURE — G8482 FLU IMMUNIZE ORDER/ADMIN: HCPCS | Performed by: STUDENT IN AN ORGANIZED HEALTH CARE EDUCATION/TRAINING PROGRAM

## 2019-01-31 PROCEDURE — 4004F PT TOBACCO SCREEN RCVD TLK: CPT | Performed by: STUDENT IN AN ORGANIZED HEALTH CARE EDUCATION/TRAINING PROGRAM

## 2019-01-31 RX ORDER — ALBUTEROL SULFATE 2.5 MG/3ML
2.5 SOLUTION RESPIRATORY (INHALATION) EVERY 6 HOURS PRN
Qty: 120 EACH | Refills: 3 | Status: SHIPPED | OUTPATIENT
Start: 2019-01-31 | End: 2019-08-01 | Stop reason: SDUPTHER

## 2019-01-31 RX ORDER — ALBUTEROL SULFATE 90 UG/1
2 AEROSOL, METERED RESPIRATORY (INHALATION) EVERY 6 HOURS PRN
Qty: 1 INHALER | Refills: 3 | Status: SHIPPED | OUTPATIENT
Start: 2019-01-31 | End: 2019-08-01 | Stop reason: SDUPTHER

## 2019-01-31 RX ORDER — TOPIRAMATE 100 MG/1
100 TABLET, FILM COATED ORAL DAILY
Qty: 30 TABLET | Refills: 5 | Status: SHIPPED | OUTPATIENT
Start: 2019-01-31 | End: 2019-08-01 | Stop reason: SDUPTHER

## 2019-01-31 RX ORDER — FAMOTIDINE 20 MG/1
20 TABLET, FILM COATED ORAL 2 TIMES DAILY
Qty: 60 TABLET | Refills: 5 | Status: SHIPPED | OUTPATIENT
Start: 2019-01-31 | End: 2019-08-01 | Stop reason: SDUPTHER

## 2019-04-03 ENCOUNTER — OFFICE VISIT (OUTPATIENT)
Dept: ENT CLINIC | Age: 59
End: 2019-04-03
Payer: MEDICARE

## 2019-04-03 VITALS
DIASTOLIC BLOOD PRESSURE: 64 MMHG | WEIGHT: 268.9 LBS | SYSTOLIC BLOOD PRESSURE: 112 MMHG | HEART RATE: 47 BPM | HEIGHT: 75 IN | BODY MASS INDEX: 33.43 KG/M2

## 2019-04-03 DIAGNOSIS — G43.019 INTRACTABLE MIGRAINE WITHOUT AURA AND WITHOUT STATUS MIGRAINOSUS: ICD-10-CM

## 2019-04-03 DIAGNOSIS — J30.1 ALLERGIC RHINITIS DUE TO POLLEN, UNSPECIFIED SEASONALITY: Primary | ICD-10-CM

## 2019-04-03 PROCEDURE — G8417 CALC BMI ABV UP PARAM F/U: HCPCS | Performed by: OTOLARYNGOLOGY

## 2019-04-03 PROCEDURE — G8427 DOCREV CUR MEDS BY ELIG CLIN: HCPCS | Performed by: OTOLARYNGOLOGY

## 2019-04-03 PROCEDURE — 3017F COLORECTAL CA SCREEN DOC REV: CPT | Performed by: OTOLARYNGOLOGY

## 2019-04-03 PROCEDURE — 99213 OFFICE O/P EST LOW 20 MIN: CPT | Performed by: OTOLARYNGOLOGY

## 2019-04-03 PROCEDURE — 4004F PT TOBACCO SCREEN RCVD TLK: CPT | Performed by: OTOLARYNGOLOGY

## 2019-04-03 RX ORDER — MONTELUKAST SODIUM 10 MG/1
10 TABLET ORAL NIGHTLY
Qty: 30 TABLET | Refills: 5 | Status: SHIPPED | OUTPATIENT
Start: 2019-04-03 | End: 2019-08-01 | Stop reason: SDUPTHER

## 2019-04-03 RX ORDER — FLUTICASONE PROPIONATE 50 MCG
2 SPRAY, SUSPENSION (ML) NASAL DAILY
Qty: 1 BOTTLE | Refills: 3 | Status: SHIPPED | OUTPATIENT
Start: 2019-04-03 | End: 2019-08-01 | Stop reason: SDUPTHER

## 2019-04-03 RX ORDER — AZELASTINE 1 MG/ML
2 SPRAY, METERED NASAL DAILY
Qty: 1 BOTTLE | Refills: 5 | Status: SHIPPED | OUTPATIENT
Start: 2019-04-03 | End: 2019-08-01 | Stop reason: SDUPTHER

## 2019-04-09 DIAGNOSIS — I42.0 DILATED CARDIOMYOPATHY (HCC): ICD-10-CM

## 2019-04-09 DIAGNOSIS — E11.8 TYPE 2 DIABETES MELLITUS WITH COMPLICATION, WITHOUT LONG-TERM CURRENT USE OF INSULIN (HCC): Chronic | ICD-10-CM

## 2019-04-10 RX ORDER — LISINOPRIL 10 MG/1
10 TABLET ORAL DAILY
Qty: 90 TABLET | Refills: 0 | Status: SHIPPED | OUTPATIENT
Start: 2019-04-10 | End: 2019-08-01 | Stop reason: SDUPTHER

## 2019-04-20 ASSESSMENT — ENCOUNTER SYMPTOMS
COUGH: 0
SHORTNESS OF BREATH: 0
VOMITING: 0

## 2019-04-20 NOTE — PROGRESS NOTES
[facility] Otolaryngology  Dr. Megan Lane. Oriana Vickyanish. Ms.Ed        Patient Name:  Dillon Patricio  :  1960     CHIEF C/O:    Chief Complaint   Patient presents with    Follow-up     yearly allergy check; ears feel like filled with goo; usual allergy symptoms; needs    Other     needs refill on astelin and flonase        HISTORY OBTAINED FROM:  patient    HISTORY OF PRESENT ILLNESS:       Marysol Salas is a 60y.o. year old male, here today for follow up of routine allergy follow-up. Patient states to drink quite while this point, no new complaint for fevers chills or recurrent sinusitis. Patient is noted complaints of headaches or vision changes. Past Medical History:   Diagnosis Date    Arthritis     Atrial fibrillation ([address]     Bruising tendency (HCC)     Cardiomyopathy ([address]     probably viral, LV systolic dysfunction EF 40% 2011    Cerebral artery occlusion with cerebral infarction [facility])     TIA    CHF (congestive heart failure) ([address]     ef 20    Diabetes mellitus ([address]     Headache(784.0)     Heart trouble ,     Hospitalized (WHAT TYPE?)    History of blood transfusion     History of frequent ear infections     Hx of blood clots     HEART    Hyperlipidemia     Hypertension     Memory loss     Recurrent infections , ,     ASK WHERE?     Speech problem      Past Surgical History:   Procedure Laterality Date    A-V CARDIAC PACEMAKER INSERTION Left 2017    Dual ICD removed Dual chamber Medtronic Pacer insertion by Dr Anjum Gilmore DYSRHYTHMIC FOCUS  2013    aflutter ablation    CARDIAC DEFIBRILLATOR PLACEMENT  10/27/2011    Dr. Una Scott, dual chamber  Medtronic    CARDIOVERSION  2013    Cardioversion   Dr. Philip Arboleda    right    SINUS SURGERY      polyps removed     TONSILLECTOMY      TRANSESOPHAGEAL ECHOCARDIOGRAM  2011         VASECTOMY  1998       Current Outpatient Medications:   azelastine (ASTELIN) 0.1 % nasal spray, 2 sprays by Nasal route daily Use in each nostril as directed, Disp: 1 Bottle, Rfl: 5    montelukast (SINGULAIR) 10 MG tablet, Take 1 tablet by mouth nightly, Disp: 30 tablet, Rfl: 5    fluticasone (FLONASE) 50 MCG/ACT nasal spray, 2 sprays by Nasal route daily 2 sprays each nostril once a day, Disp: 1 Bottle, Rfl: 3    Elastic Bandages & Supports (KNEE BRACE) MISC, Soft neutral position right knee brace Size to fit Dx:  Knee pain, Disp: 1 each, Rfl: 0    topiramate (TOPAMAX) 100 MG tablet, Take 1 tablet by mouth daily, Disp: 30 tablet, Rfl: 5    famotidine (PEPCID) 20 MG tablet, Take 1 tablet by mouth 2 times daily, Disp: 60 tablet, Rfl: 5    albuterol (PROVENTIL) (2.5 MG/3ML) 0.083% nebulizer solution, Take 3 mLs by nebulization every 6 hours as needed for Wheezing, Disp: 120 each, Rfl: 3    albuterol sulfate HFA (PROVENTIL HFA) 108 (90 Base) MCG/ACT inhaler, Inhale 2 puffs into the lungs every 6 hours as needed for Wheezing, Disp: 1 Inhaler, Rfl: 3    digoxin (DIGOX) 250 MCG tablet, Take 1 tablet by mouth daily, Disp: 90 tablet, Rfl: 3    spironolactone (ALDACTONE) 25 MG tablet, Take 1 tablet by mouth daily, Disp: 30 tablet, Rfl: 5    furosemide (LASIX) 20 MG tablet, Take 1 tablet by mouth daily, Disp: 30 tablet, Rfl: 6    atorvastatin (LIPITOR) 40 MG tablet, Take 2 tablets by mouth nightly, Disp: 30 tablet, Rfl: 5    zoster recombinant adjuvanted vaccine (SHINGRIX) 50 MCG SUSR injection, 50 MCG IM then repeat 2-6 months., Disp: 0.5 mL, Rfl: 1    Elastic Bandages & Supports (KNEE BRACE) MISC, Soft neutral position right knee brace Size to fit Dx:  Knee pain, Disp: 1 each, Rfl: 0    carvedilol (COREG) 12.5 MG tablet, Take 1 tablet by mouth 2 times daily (with meals), Disp: 180 tablet, Rfl: 3    aspirin 81 MG tablet, Take 1 tablet by mouth daily, Disp: 30 tablet, Rfl: 5    Elastic Bandages & Supports (KNEE BRACE) MISC, Soft neutral position right knee brace Size to fit Dx:  Knee pain, Disp: 1 each, Rfl: 0    metFORMIN (GLUCOPHAGE) 1000 MG tablet, Take 1 tablet by mouth 2 times daily (with meals), Disp: 180 tablet, Rfl: 0    lisinopril (PRINIVIL;ZESTRIL) 10 MG tablet, Take 1 tablet by mouth daily, Disp: 90 tablet, Rfl: 0  Levaquin [levofloxacin in d5w]; Penicillins; and Sulfa antibiotics  Social History     Tobacco Use    Smoking status: Current Every Day Smoker     Packs/day: 0.25     Years: 35.00     Pack years: 8.75     Types: Cigarettes    Smokeless tobacco: Never Used   Substance Use Topics    Alcohol use: Yes     Alcohol/week: 0.0 oz     Comment: rarely; coffee 3-4 cups a day    Drug use: No     Family History   Problem Relation Age of Onset    Cancer Mother         colon, Charlene Buff to liver and lungs    High Blood Pressure Brother     High Cholesterol Brother        Review of Systems   Constitutional: Negative for chills and fever. HENT: Negative for ear discharge and hearing loss. Respiratory: Negative for cough and shortness of breath. Cardiovascular: Negative for chest pain and palpitations. Gastrointestinal: Negative for vomiting. Skin: Negative for rash. Allergic/Immunologic: Negative for environmental allergies. Neurological: Negative for dizziness and headaches. Hematological: Does not bruise/bleed easily. All other systems reviewed and are negative. /64 (Site: Right Upper Arm, Position: Sitting, Cuff Size: Large Adult)   Pulse (!) 47   Ht 6' 3\" (1.905 m)   Wt 268 lb 14.4 oz (122 kg)   BMI 33.61 kg/m²   Physical Exam   Constitutional: He appears well-developed and well-nourished. HENT:   Head: Normocephalic and atraumatic. Right Ear: Tympanic membrane, external ear and ear canal normal.   Left Ear: Tympanic membrane, external ear and ear canal normal.   Nose: Mucosal edema present. No rhinorrhea or nasal deformity. Right sinus exhibits no maxillary sinus tenderness and no frontal sinus tenderness.  Left sinus exhibits no maxillary sinus tenderness and no frontal sinus tenderness. Eyes: Pupils are equal, round, and reactive to light. EOM are normal.   Neck: Normal range of motion. No tracheal deviation present. No thyromegaly present. Cardiovascular: Normal rate and intact distal pulses. Pulmonary/Chest: Effort normal. No respiratory distress. Musculoskeletal: Normal range of motion. He exhibits no edema. Lymphadenopathy:     He has no cervical adenopathy. Neurological: He is alert. No cranial nerve deficit. Skin: Skin is warm. No erythema. Nursing note and vitals reviewed. IMPRESSION/PLAN:  Patient seen and examined for a known history of seasonal allergies with history of chronic allergic rhinitis congestion. Patient was treated with daily nasal Astelin and Flonase as under good control now. No new complaints today, refills provided risk and benefits reviewed follow-up in 6 months or sooner if any changes. Dr. Domonique Owens.  Otolaryngology Facial Plastic Surgery  :  [facility] Otolaryngology/Facial Plastic Surgery Residency  Associate Clinical Professor:  [facility]

## 2019-04-30 ENCOUNTER — NURSE ONLY (OUTPATIENT)
Dept: NON INVASIVE DIAGNOSTICS | Age: 59
End: 2019-04-30
Payer: MEDICARE

## 2019-04-30 DIAGNOSIS — Z95.0 PACEMAKER: Primary | ICD-10-CM

## 2019-04-30 DIAGNOSIS — I49.5 SINUS NODE DYSFUNCTION (HCC): ICD-10-CM

## 2019-04-30 PROCEDURE — 93294 REM INTERROG EVL PM/LDLS PM: CPT | Performed by: INTERNAL MEDICINE

## 2019-04-30 PROCEDURE — 93296 REM INTERROG EVL PM/IDS: CPT | Performed by: INTERNAL MEDICINE

## 2019-05-08 ENCOUNTER — TELEPHONE (OUTPATIENT)
Dept: NON INVASIVE DIAGNOSTICS | Age: 59
End: 2019-05-08

## 2019-05-08 NOTE — PROGRESS NOTES
See [employer] [place] report. Remote monitoring reviewed over a 90 day period. End of 90 day monitoring period date of service 4.30.2019.

## 2019-05-30 ENCOUNTER — OFFICE VISIT (OUTPATIENT)
Dept: CARDIOLOGY CLINIC | Age: 59
End: 2019-05-30
Payer: MEDICARE

## 2019-05-30 VITALS
BODY MASS INDEX: 32.27 KG/M2 | OXYGEN SATURATION: 98 % | HEART RATE: 74 BPM | WEIGHT: 265 LBS | HEIGHT: 76 IN | DIASTOLIC BLOOD PRESSURE: 84 MMHG | RESPIRATION RATE: 18 BRPM | SYSTOLIC BLOOD PRESSURE: 132 MMHG

## 2019-05-30 DIAGNOSIS — I42.8 CARDIOMYOPATHY, NONISCHEMIC (HCC): ICD-10-CM

## 2019-05-30 DIAGNOSIS — I10 ESSENTIAL HYPERTENSION: ICD-10-CM

## 2019-05-30 DIAGNOSIS — I48.3 TYPICAL ATRIAL FLUTTER (HCC): Primary | ICD-10-CM

## 2019-05-30 PROCEDURE — G8417 CALC BMI ABV UP PARAM F/U: HCPCS | Performed by: INTERNAL MEDICINE

## 2019-05-30 PROCEDURE — 4004F PT TOBACCO SCREEN RCVD TLK: CPT | Performed by: INTERNAL MEDICINE

## 2019-05-30 PROCEDURE — 3017F COLORECTAL CA SCREEN DOC REV: CPT | Performed by: INTERNAL MEDICINE

## 2019-05-30 PROCEDURE — G8427 DOCREV CUR MEDS BY ELIG CLIN: HCPCS | Performed by: INTERNAL MEDICINE

## 2019-05-30 PROCEDURE — 93000 ELECTROCARDIOGRAM COMPLETE: CPT | Performed by: INTERNAL MEDICINE

## 2019-05-30 PROCEDURE — 99213 OFFICE O/P EST LOW 20 MIN: CPT | Performed by: INTERNAL MEDICINE

## 2019-05-30 SDOH — HEALTH STABILITY: MENTAL HEALTH: HOW MANY STANDARD DRINKS CONTAINING ALCOHOL DO YOU HAVE ON A TYPICAL DAY?: 1 OR 2

## 2019-05-30 SDOH — HEALTH STABILITY: MENTAL HEALTH: HOW OFTEN DO YOU HAVE A DRINK CONTAINING ALCOHOL?: 2-4 TIMES A MONTH

## 2019-05-30 NOTE — PROGRESS NOTES
[facility]  MD Dominga Tarango MD Raydell Candela, MD  Gloria Border. Radha Hanna MD            Anthony Daniels   1960  Reina Hinojosa MD    Mr. Anthony Daniels was seen in the outpatient office on 5/31/2019 for follow up of his cardiomyopathy and PAF. This 62-year-old gentleman has a history of a nonischemic cardiomyopathy, ICD implant, hypertension and paroxysmal atrial flutter. His last echocardiogram in 2017 revealed normalization of his EF. He follows Dr. Kalpana Patel for his A. Flutter as well as for his device. He presented to our office today for a 6 month follow up visit. He notes no new cardiac complaints. He has occasional \"twinges\" across his chest that he has had for years. He has not noticed any frequency or intensity of his symptoms. These twinges reportedly occur with various changes. On a routine day, he can exert himself without any cardiac symptoms. Specifically, he denied any exertional chest pain or shortness of breath. No orthopnea, PND or lower extremity edema. No more episodes of palpitations. Past medical history:   1. Allergy to codeine and macrolide antibiotics. 2. Obesity. BMI 33.4 - 07/2015.    3. Remote substance abuse. Abstinent since 1980's. Patient also had significant alcohol consumption in the remote past, but has not had a drink in 2011. He has been a long time smoker and continues to smoke ten cigarettes per day as of 07/09/2014.  4. [facility] admission, 07/06/2011 with CHF, AF/RVR. Echo EF 22% with four chamber enlargement.    5. Lexiscan MPS, [facility], 07/08/2011 revealed normal perfusion. EF markedly depressed. 6. Readmission [facility], 07/18/2011 with vague chest and abdominal discomfort. He was transferred to [facility].    7. Cardiac catheterization, Dr. Siu [place], 07/20/2011 revealed normal coronary arteries. LVE with global LV hypokinesis, EF 20%. Treated with ACEI and beta blocker therapy. EP consultation obtained with Dr. Kalpana Patel.    8. Right knee arthroscopic surgery. 9. No family history of premature vascular disease.    10. Echo, 08/22/2011. Moderately dilated LV with normal wall thickness. Global hypokinesis with moderate to severe LV systolic dysfunction. Estimated EF about 30%. Marked LAE, moderate JEANNE, Doppler evidence for normal LA pressures. Trivial MR and TR, normal RVSP. IVC appears normal.  11. Medtronic ICD implant, 11/2011. 12. LASHAUN guided DCCV scheduled Dr. Yareli Cardenas, 12/08/2011, but cancelled because of LA thrombus. 13. DCCV, 02/07/2012, Dr. Yareli Cradenas with restoration of NSR. NSR documented on OP visit 05/02/2012.    14. Recurrent atrial flutter with DCCV, Dr. Yareli Cardenas, 01/25/2013. 15. RF ablation of atrial flutter, 12/13/2013, Dr. Yareli Cardenas. 16. Echo, 05/07/2014. Limited study. LVE. Normal systolic function. Stage I diastolic dysfunction. [facility]. 16. Echo 11/07/2016.  Dr. Yesi Perez.  EF >65%.  Stage I diastolic dysfunction.  Normal right atrial size.  Normal RV function.  No significant valvular heart disease. 18. Lexiscan MPS, 1/11/2017, Normal perfusion. EF 50%. 19. Echocardiogram, 4/3/2017, limited study. EF normal.  Stage 1 diastolic dysfunction.      Review of Systems:  HEENT: negative for acute visual and auditory problems  Constitutional: negative for fever and chills  Respiratory: negative for cough and hemoptysis  Cardiovascular: as per HPI  Gastrointestinal: negative for abdominal pain, diarrhea, nausea and vomiting  Genitourinary: negative for dysuria and hematuria  Derm: negative for rash and skin lesion(s)  Neurological: negative for seizures and tremors  Endocrine: negative for diabetic symptoms including polydipsia and polyuria  Musculoskeletal: negative for CTD  Psychiatric: negative for anxiety and major depression    On exam, he is a pleasant, obese, middle-aged gentleman in no particular distress. BP: 132/84. P: 74.  R: 18.  Wt. 265 lbs. BMI: 32.  Satting 98% on room air. HEENT, conjunctiva pink.  Sclerae anicteric. Mucous membranes are moist.  Neck, no JVD could be appreciated. Carotid upstrokes normal.  No bruits. Chest expands normally. No intercostal retractions. Cardiovascular exam reveals normal S1/S2. Regular rate and rhythm. No murmurs, rubs or gallops noted. Lungs have good air entry bilaterally without any creps, rhonchi or wheezes. Abdomen is soft, nontender with intact bowel sounds. Extremities have no edema. Distal pulses are normal bilaterally. No cyanosis. Skin has no rashes. Neurologically, oriented x 3. Psych, patient is pleasant. Back has no tenderness. Muscle tone is normal.       EKG, as per my interpretation, reveals sinus rhythm. 1st degree AV block. Nonspecific T wave abnormality. No acute changes compared to prior. Mr. Adrián Joe was in the outpatient office for follow up of his history of cardiomyopathy and PAF. As mentioned, his last EF was normal.  He is compensated and euvolemic today. He is warm and well perfused. His biventricular function, however, will be assessed with an outpatient echocardiogram.    He remains in sinus rhythm today. He follows with Dr. Vashti Hernandez for arrhythmias. He is not on anticoagulation presently.       I made no medication changes today, which include the following:   metFORMIN (GLUCOPHAGE) 1000 MG tablet Take 1 tablet by mouth 2 times daily (with meals)   lisinopril (PRINIVIL;ZESTRIL) 10 MG tablet Take 1 tablet by mouth daily   azelastine (ASTELIN) 0.1 % nasal spray 2 sprays by Nasal route daily Use in each nostril as directed   montelukast (SINGULAIR) 10 MG tablet Take 1 tablet by mouth nightly   fluticasone (FLONASE) 50 MCG/ACT nasal spray 2 sprays by Nasal route daily 2 sprays each nostril once a day   topiramate (TOPAMAX) 100 MG tablet Take 1 tablet by mouth daily   famotidine (PEPCID) 20 MG tablet Take 1 tablet by mouth 2 times daily   albuterol (PROVENTIL) (2.5 MG/3ML) 0.083% nebulizer solution Take 3 mLs by nebulization every 6 hours as needed for Wheezing   albuterol sulfate HFA (PROVENTIL HFA) 108 (90 Base) MCG/ACT inhaler Inhale 2 puffs into the lungs every 6 hours as needed for Wheezing   digoxin (DIGOX) 250 MCG tablet Take 1 tablet by mouth daily   spironolactone (ALDACTONE) 25 MG tablet Take 1 tablet by mouth daily   furosemide (LASIX) 20 MG tablet Take 1 tablet by mouth daily   atorvastatin (LIPITOR) 40 MG tablet Take 2 tablets by mouth nightly   carvedilol (COREG) 12.5 MG tablet Take 1 tablet by mouth 2 times daily (with meals)   aspirin 81 MG tablet Take 1 tablet by mouth daily   Elastic Bandages & Supports (KNEE BRACE) MISC Soft neutral position right knee brace   Size to fit   Dx:  Knee pain   zoster recombinant adjuvanted vaccine (SHINGRIX) 50 MCG SUSR injection 50 MCG IM then repeat 2-6 months. Elastic Bandages & Supports (KNEE BRACE) MISC Soft neutral position right knee brace   Size to fit   Dx: Clarise Officer pain   Elastic Bandages & Supports (KNEE BRACE) MISC Soft neutral position right knee brace   Size to fit   Dx:  Knee pain     He was encouraged to continue to exercise to lose weight. Cardiac diet and salt restriction was reinforced to him. Should he remain stable, he will be seen back in our office in a year. Thank you for allowing us to participate in the care of this patient.       RPV/tlr

## 2019-05-30 NOTE — PATIENT INSTRUCTIONS
Patient Education        A Healthy Heart: Care Instructions  Your Care Instructions    Heart disease occurs when a substance called plaque builds up in the vessels that supply oxygen-rich blood to your heart. This can narrow the blood vessels and reduce blood flow. A heart attack happens when blood flow is completely blocked. A high-fat diet, smoking, and other factors increase the risk of heart disease. Your doctor has found that you have a chance of having heart disease. You can do lots of things to keep your heart healthy. It may not be easy, but you can change your diet, exercise more, and quit smoking. These steps really work to lower your chance of heart disease. Follow-up care is a key part of your treatment and safety. Be sure to make and go to all appointments, and call your doctor if you are having problems. It's also a good idea to know your test results and keep a list of the medicines you take. How can you care for yourself at home? Diet    · Use less salt when you cook and eat. This helps lower your blood pressure. Taste food before salting. Add only a little salt when you think you need it. With time, your taste buds will adjust to less salt.     · Eat fewer snack items, fast foods, canned soups, and other high-salt, high-fat, processed foods.     · Read food labels and try to avoid saturated and trans fats. They increase your risk of heart disease by raising cholesterol levels.     · Limit the amount of solid fat-butter, margarine, and shortening-you eat. Use olive, peanut, or canola oil when you cook. Bake, broil, and steam foods instead of frying them.     · Eating fish can lower your risk for heart disease. Eat at least 2 servings of fish a week. [place], mackerel, herring, sardines, and chunk light tuna are very good choices. These fish contain omega-3 fatty acids.     · Eat a variety of fruit and vegetables every day.  Dark green, deep orange, red, or yellow fruits and vegetables are especially good for you. Examples include spinach, carrots, peaches, and berries.     · Foods high in fiber can reduce your cholesterol and provide important vitamins and minerals. High-fiber foods include whole-grain cereals and breads, oatmeal, beans, brown rice, citrus fruits, and apples.     · Limit drinks and foods with added sugar. These include candy, desserts, and soda pop.    Lifestyle changes    · If your doctor recommends it, get more exercise. Walking is a good choice. Bit by bit, increase the amount you walk every day. Try for at least 30 minutes on most days of the week. You also may want to swim, bike, or do other activities.     · Do not smoke. If you need help quitting, talk to your doctor about stop-smoking programs and medicines. These can increase your chances of quitting for good. Quitting smoking may be the most important step you can take to protect your heart. It is never too late to quit. You will get health benefits right away.     · Limit alcohol to 2 drinks a day for men and 1 drink a day for women. Too much alcohol can cause health problems. Medicines    · Take your medicines exactly as prescribed. Call your doctor if you think you are having a problem with your medicine.     · If your doctor recommends aspirin, take the amount directed each day. Make sure you take aspirin and not another kind of pain reliever, such as acetaminophen (Tylenol). If you take ibuprofen (such as Advil or Motrin) for other problems, take aspirin at least 2 hours before taking ibuprofen. When should you call for help? Call 911 if you have symptoms of a heart attack.  These may include:    · Chest pain or pressure, or a strange feeling in the chest.     · Sweating.     · Shortness of breath.     · Pain, pressure, or a strange feeling in the back, neck, jaw, or upper belly or in one or both shoulders or arms.     · Lightheadedness or sudden weakness.     · A fast or irregular heartbeat.    After you call 911, the  may tell you to chew 1 adult-strength or 2 to 4 low-dose aspirin. Wait for an ambulance. Do not try to drive yourself.   Watch closely for changes in your health, and be sure to contact your doctor if you have any problems. Where can you learn more? Go to https://chpepiceweb.[email or web address]. org and sign in to your Continuent account. Enter X827 in the Diagnoplex box to learn more about \"A Healthy Heart: Care Instructions. \"     If you do not have an account, please click on the \"Sign Up Now\" link. Current as of: July 22, 2018  Content Version: 12.0  © 4793-5810 Healthwise, Incorporated. Care instructions adapted under license by [facility] ([facility]). If you have questions about a medical condition or this instruction, always ask your healthcare professional. [address] any warranty or liability for your use of this information.

## 2019-06-03 DIAGNOSIS — I42.0 DILATED CARDIOMYOPATHY (HCC): ICD-10-CM

## 2019-06-03 RX ORDER — DIGOXIN 250 MCG
250 TABLET ORAL DAILY
Qty: 90 TABLET | Refills: 3 | Status: SHIPPED | OUTPATIENT
Start: 2019-06-03 | End: 2020-02-17 | Stop reason: SDUPTHER

## 2019-06-13 DIAGNOSIS — I42.0 DILATED CARDIOMYOPATHY (HCC): ICD-10-CM

## 2019-06-13 RX ORDER — CARVEDILOL 12.5 MG/1
12.5 TABLET ORAL 2 TIMES DAILY WITH MEALS
Qty: 180 TABLET | Refills: 3 | Status: SHIPPED | OUTPATIENT
Start: 2019-06-13 | End: 2020-09-17 | Stop reason: SDUPTHER

## 2019-06-18 ENCOUNTER — HOSPITAL ENCOUNTER (OUTPATIENT)
Dept: CARDIOLOGY | Age: 59
Discharge: HOME | End: 2019-06-18
Payer: MEDICARE

## 2019-06-18 DIAGNOSIS — I42.8 CARDIOMYOPATHY, NONISCHEMIC (HCC): ICD-10-CM

## 2019-06-18 DIAGNOSIS — I10 ESSENTIAL HYPERTENSION: ICD-10-CM

## 2019-06-18 DIAGNOSIS — I48.3 TYPICAL ATRIAL FLUTTER (HCC): ICD-10-CM

## 2019-06-18 LAB
LV EF: 65 %
LVEF MODALITY: NORMAL

## 2019-06-18 PROCEDURE — 93306 TTE W/DOPPLER COMPLETE: CPT

## 2019-07-23 DIAGNOSIS — E11.8 TYPE 2 DIABETES MELLITUS WITH COMPLICATION, WITHOUT LONG-TERM CURRENT USE OF INSULIN (HCC): Chronic | ICD-10-CM

## 2019-07-30 ASSESSMENT — ENCOUNTER SYMPTOMS
ORTHOPNEA: 0
SHORTNESS OF BREATH: 0

## 2019-07-31 ENCOUNTER — OFFICE VISIT (OUTPATIENT)
Dept: NON INVASIVE DIAGNOSTICS | Age: 59
End: 2019-07-31
Payer: MEDICARE

## 2019-07-31 ENCOUNTER — HOSPITAL ENCOUNTER (OUTPATIENT)
Age: 59
Discharge: HOME | End: 2019-07-31
Payer: MEDICARE

## 2019-07-31 VITALS
BODY MASS INDEX: 31.88 KG/M2 | RESPIRATION RATE: 16 BRPM | HEART RATE: 76 BPM | HEIGHT: 76 IN | SYSTOLIC BLOOD PRESSURE: 130 MMHG | WEIGHT: 261.8 LBS | DIASTOLIC BLOOD PRESSURE: 82 MMHG

## 2019-07-31 DIAGNOSIS — I42.8 CARDIOMYOPATHY, NONISCHEMIC (HCC): Chronic | ICD-10-CM

## 2019-07-31 DIAGNOSIS — Z95.0 PACEMAKER: Primary | ICD-10-CM

## 2019-07-31 DIAGNOSIS — E78.5 DYSLIPIDEMIA: ICD-10-CM

## 2019-07-31 DIAGNOSIS — I48.0 PAROXYSMAL ATRIAL FIBRILLATION (HCC): Chronic | ICD-10-CM

## 2019-07-31 LAB
CHOLESTEROL, TOTAL: 140 MG/DL (ref 0–199)
HDLC SERPL-MCNC: 34 MG/DL
LDL CHOLESTEROL CALCULATED: 85 MG/DL (ref 0–99)
TRIGL SERPL-MCNC: 105 MG/DL (ref 0–149)
VLDLC SERPL CALC-MCNC: 21 MG/DL

## 2019-07-31 PROCEDURE — G8417 CALC BMI ABV UP PARAM F/U: HCPCS | Performed by: NURSE PRACTITIONER

## 2019-07-31 PROCEDURE — 93000 ELECTROCARDIOGRAM COMPLETE: CPT | Performed by: INTERNAL MEDICINE

## 2019-07-31 PROCEDURE — 36415 COLL VENOUS BLD VENIPUNCTURE: CPT

## 2019-07-31 PROCEDURE — 4004F PT TOBACCO SCREEN RCVD TLK: CPT | Performed by: NURSE PRACTITIONER

## 2019-07-31 PROCEDURE — 80061 LIPID PANEL: CPT

## 2019-07-31 PROCEDURE — 93280 PM DEVICE PROGR EVAL DUAL: CPT | Performed by: NURSE PRACTITIONER

## 2019-07-31 PROCEDURE — 3017F COLORECTAL CA SCREEN DOC REV: CPT | Performed by: NURSE PRACTITIONER

## 2019-07-31 PROCEDURE — G8427 DOCREV CUR MEDS BY ELIG CLIN: HCPCS | Performed by: NURSE PRACTITIONER

## 2019-07-31 PROCEDURE — 99214 OFFICE O/P EST MOD 30 MIN: CPT | Performed by: NURSE PRACTITIONER

## 2019-07-31 NOTE — PROGRESS NOTES
Electrophysiology Outpatient Follow-Up Note    Toby Oar  1960  Date of Service: 7/31/2019  Referring Provider/PCP: Maximus Gerber MD  Cardiology: [facility]) Cardiology  Electrophysiologist: Wilbert Hicks DO    Patient Active Problem List    Diagnosis Date Noted    Typical atrial flutter ([facility] [address] 03/28/2012     Priority: High     Overview Note:     1. Chronic anticoagulation with Coumadin  2. LASHAUN 12/2011: + CAROLA thrombus  3. DCCV 2/14/2012 and 1/25/2013: Sinus rhythm  4. Successful RF ablation of typical, counter-clockwise atrial flutter 12/13/2013  5. ECHO 5/7/2014: LVE, [facility], normal wall motion, stage I diastolic dysfunction, limited study  6. Successful RF ablation of typical, counter-clockwise atrial flutter 12/13/2013  7. ECHO 5/7/2014: LVE, [facility], normal wall motion, stage I diastolic dysfunction, limited study      S/P ICD (internal cardiac defibrillator) procedure 10/27/2011     Priority: High     Overview Note:     1. Medtronic [place], [place], model B263184, serial [id number] implanted 10/27/11  2. Right atrial lead is a Medtronic, model T6233388, serial [id number]  3. Right ventricular lead is a Medtronic, model X5137136, serial [id number]  4. Successful RF ablation of typical, counter-clockwise atrial flutter 12/13/2013  5. ECHO 5/7/2014: LVE, [facility], normal wall motion, stage I diastolic dysfunction, limited study          Cardiomyopathy, nonischemic ([facility] [address] 10/06/2011     Priority: High     Overview Note:     1. ECHO 8/22/2011: EF=30%, RADHA   2. ECHO 7/6/2011: EF=25%, severe LVE, mild MR  3. MPS 7/8/2011: Normal perfusion, reduced EF  4. Cath 7/20/2011: Normal coronaries, EF=20%  5. Successful RF ablation of typical, counter-clockwise atrial flutter 12/13/2013  6. ECHO 5/7/2014: LVE, [facility], normal wall motion, stage I diastolic dysfunction, LVEF of 60%.       Obesity 01/09/2013     Priority: Medium     Class: Chronic    Chronic headaches 01/09/2013     Priority: Medium     Class: Effort normal and breath sounds normal. No respiratory distress. Abdominal: Soft. No tenderness. Musculoskeletal: Normal range of motion of all extremities, no muscle weakness. Neurological: Alert and oriented to person, place, and time. Gait normal.   Skin: Skin is warm and dry. No bruising, no ecchymosis and no rash noted. Extremity: No clubbing or cyanosis. No edema. Psychiatric: Normal mood and affect. Thought content normal.   Pacemaker site: stable, well healed, no evidence of erosion. Device Interrogation/Reprogramming (7/31/19)  Make/Model Medtronic Ad[address]a DR GUTIERREZ. DOI 4/26/17  Mode MVP-R 70/130 ppm.   P wave: 2.6 mV  Impedance: 361 ohms   Threshold: 0.75 V @ 0.4 ms  RV R wave: 8.9mV  Impedance: 342ohms   Threshold: 0.75 V @ 0.4 ms  Pacing: A: 84%  RV: 1.4%    Battery Voltage/Longevity:  3.01 V, 7.5 years    Arrhythmias: None since last [facility] in November  Overall device function is normal  All device programmable settings were evaluated per above and in the scanned document, along with iterative adjustments (capture thresholds) to assess and select the most appropriate final programming to provide for consistent delivery of the appropriate therapy and to verify function of the device. I have reviewed all progress notes & records from the time of the patient's last office visit up to present. EKG: SR, HR 72 bpm, no acute changes, normal OK and Qt interval, normal QRSd    Stress test 1/11/17 Dacia Mccauley)  - no ischemia  - EF 50%    TTE(4/3/2017):   Transthoracic Echocardiography Report (TTE)     Demographics      Patient Name       Janet Reis     Gender              Male                     [facility]      Medical Record     [id number]         Room Number         Terisa Harada      Account #         [de-identified]       Procedure Date      04/03/2017      Corporate ID                        Ordering Physician Matthew Edouard DO      Accession Number   [id number]        Referring Physician Clif Ramirez [address]

## 2019-08-01 ENCOUNTER — OFFICE VISIT (OUTPATIENT)
Dept: INTERNAL MEDICINE | Age: 59
End: 2019-08-01
Payer: MEDICARE

## 2019-08-01 VITALS
HEIGHT: 76 IN | BODY MASS INDEX: 31.78 KG/M2 | TEMPERATURE: 98.4 F | HEART RATE: 75 BPM | RESPIRATION RATE: 18 BRPM | WEIGHT: 261 LBS | SYSTOLIC BLOOD PRESSURE: 124 MMHG | DIASTOLIC BLOOD PRESSURE: 81 MMHG

## 2019-08-01 DIAGNOSIS — Z95.810 S/P ICD (INTERNAL CARDIAC DEFIBRILLATOR) PROCEDURE: ICD-10-CM

## 2019-08-01 DIAGNOSIS — E78.5 DYSLIPIDEMIA: ICD-10-CM

## 2019-08-01 DIAGNOSIS — I48.3 TYPICAL ATRIAL FLUTTER (HCC): ICD-10-CM

## 2019-08-01 DIAGNOSIS — I42.0 DILATED CARDIOMYOPATHY (HCC): ICD-10-CM

## 2019-08-01 DIAGNOSIS — E11.8 TYPE 2 DIABETES MELLITUS WITH COMPLICATION, WITHOUT LONG-TERM CURRENT USE OF INSULIN (HCC): Chronic | ICD-10-CM

## 2019-08-01 DIAGNOSIS — M17.0 OSTEOARTHRITIS OF BOTH KNEES, UNSPECIFIED OSTEOARTHRITIS TYPE: ICD-10-CM

## 2019-08-01 DIAGNOSIS — K21.9 GASTROESOPHAGEAL REFLUX DISEASE, ESOPHAGITIS PRESENCE NOT SPECIFIED: ICD-10-CM

## 2019-08-01 DIAGNOSIS — J40 BRONCHITIS: ICD-10-CM

## 2019-08-01 DIAGNOSIS — E11.9 TYPE 2 DIABETES MELLITUS WITHOUT COMPLICATION, WITHOUT LONG-TERM CURRENT USE OF INSULIN (HCC): Primary | ICD-10-CM

## 2019-08-01 DIAGNOSIS — G43.019 INTRACTABLE MIGRAINE WITHOUT AURA AND WITHOUT STATUS MIGRAINOSUS: ICD-10-CM

## 2019-08-01 LAB — HBA1C MFR BLD: 6.4 %

## 2019-08-01 PROCEDURE — 99213 OFFICE O/P EST LOW 20 MIN: CPT | Performed by: INTERNAL MEDICINE

## 2019-08-01 PROCEDURE — G8427 DOCREV CUR MEDS BY ELIG CLIN: HCPCS | Performed by: INTERNAL MEDICINE

## 2019-08-01 PROCEDURE — 99214 OFFICE O/P EST MOD 30 MIN: CPT | Performed by: INTERNAL MEDICINE

## 2019-08-01 PROCEDURE — 3044F HG A1C LEVEL LT 7.0%: CPT | Performed by: INTERNAL MEDICINE

## 2019-08-01 PROCEDURE — G8417 CALC BMI ABV UP PARAM F/U: HCPCS | Performed by: INTERNAL MEDICINE

## 2019-08-01 PROCEDURE — 4004F PT TOBACCO SCREEN RCVD TLK: CPT | Performed by: INTERNAL MEDICINE

## 2019-08-01 PROCEDURE — 3017F COLORECTAL CA SCREEN DOC REV: CPT | Performed by: INTERNAL MEDICINE

## 2019-08-01 PROCEDURE — 2022F DILAT RTA XM EVC RTNOPTHY: CPT | Performed by: INTERNAL MEDICINE

## 2019-08-01 PROCEDURE — 83036 HEMOGLOBIN GLYCOSYLATED A1C: CPT | Performed by: INTERNAL MEDICINE

## 2019-08-01 RX ORDER — SPIRONOLACTONE 25 MG/1
25 TABLET ORAL DAILY
Qty: 30 TABLET | Refills: 3 | Status: SHIPPED | OUTPATIENT
Start: 2019-08-01 | End: 2019-11-22 | Stop reason: SDUPTHER

## 2019-08-01 RX ORDER — ALBUTEROL SULFATE 90 UG/1
2 AEROSOL, METERED RESPIRATORY (INHALATION) EVERY 6 HOURS PRN
Qty: 1 INHALER | Refills: 3 | Status: SHIPPED
Start: 2019-08-01 | End: 2020-11-13 | Stop reason: SDUPTHER

## 2019-08-01 RX ORDER — MONTELUKAST SODIUM 10 MG/1
10 TABLET ORAL NIGHTLY
Qty: 30 TABLET | Refills: 5 | Status: SHIPPED
Start: 2019-08-01 | End: 2020-11-10

## 2019-08-01 RX ORDER — FAMOTIDINE 20 MG/1
20 TABLET, FILM COATED ORAL 2 TIMES DAILY
Qty: 60 TABLET | Refills: 3 | Status: SHIPPED | OUTPATIENT
Start: 2019-08-01 | End: 2019-11-28 | Stop reason: SDUPTHER

## 2019-08-01 RX ORDER — FLUTICASONE PROPIONATE 50 MCG
2 SPRAY, SUSPENSION (ML) NASAL DAILY
Qty: 1 BOTTLE | Refills: 3 | Status: SHIPPED
Start: 2019-08-01 | End: 2020-11-13 | Stop reason: SDUPTHER

## 2019-08-01 RX ORDER — ATORVASTATIN CALCIUM 40 MG/1
80 TABLET, FILM COATED ORAL NIGHTLY
Qty: 30 TABLET | Refills: 5 | Status: SHIPPED | OUTPATIENT
Start: 2019-08-01 | End: 2019-11-18 | Stop reason: SDUPTHER

## 2019-08-01 RX ORDER — TOPIRAMATE 100 MG/1
100 TABLET, FILM COATED ORAL DAILY
Qty: 30 TABLET | Refills: 3 | Status: SHIPPED
Start: 2019-08-01 | End: 2020-08-17 | Stop reason: ALTCHOICE

## 2019-08-01 RX ORDER — SUMATRIPTAN 25 MG/1
50 TABLET, FILM COATED ORAL
Qty: 10 TABLET | Refills: 0 | Status: SHIPPED | OUTPATIENT
Start: 2019-08-01 | End: 2019-11-28 | Stop reason: SDUPTHER

## 2019-08-01 RX ORDER — FUROSEMIDE 20 MG/1
20 TABLET ORAL DAILY
Qty: 30 TABLET | Refills: 6 | Status: SHIPPED
Start: 2019-08-01 | End: 2020-04-15 | Stop reason: SDUPTHER

## 2019-08-01 RX ORDER — ALBUTEROL SULFATE 2.5 MG/3ML
2.5 SOLUTION RESPIRATORY (INHALATION) EVERY 6 HOURS PRN
Qty: 120 EACH | Refills: 5 | Status: SHIPPED
Start: 2019-08-01 | End: 2020-11-13 | Stop reason: SDUPTHER

## 2019-08-01 RX ORDER — LISINOPRIL 10 MG/1
10 TABLET ORAL DAILY
Qty: 90 TABLET | Refills: 1 | Status: SHIPPED
Start: 2019-08-01 | End: 2020-02-17

## 2019-08-01 RX ORDER — AZELASTINE 1 MG/ML
2 SPRAY, METERED NASAL DAILY
Qty: 1 BOTTLE | Refills: 3 | Status: SHIPPED
Start: 2019-08-01 | End: 2020-11-13 | Stop reason: SDUPTHER

## 2019-08-01 NOTE — PATIENT INSTRUCTIONS
sidewalks. Preventing falls in the bath  · Install grab bars and nonskid mats inside and outside your shower or tub and near the toilet and sinks. · Use shower chairs and bath benches. · Use a hand-held shower head that will allow you to sit while showering. · Get into a tub or shower by putting the weaker leg in first. Get out of a tub or shower with your strong side first.  · Repair loose toilet seats and consider installing a raised toilet seat to make getting on and off the toilet easier. · Keep your bathroom door unlocked while you are in the shower. Where can you learn more? Go to [email or web address]://[email or web address] Industrieseb.[email or web address] org and sign in to your TerraWi account. Enter [phone] in the [employer] box to learn more about \"Preventing Falls: Care Instructions. \"     If you do not have an account, please click on the \"Sign Up Now\" link. Current as of: November 7, 2018  Content Version: 12.0  © 8147-5520 Healthwise, Incorporated. Care instructions adapted under license by [facility] ([facility]). If you have questions about a medical condition or this instruction, always ask your healthcare professional. [address] any warranty or liability for your use of this information.

## 2019-08-01 NOTE — PROGRESS NOTES
(FLONASE) 50 MCG/ACT nasal spray 2 sprays by Nasal route daily 2 sprays each nostril once a day 1 Bottle 3    topiramate (TOPAMAX) 100 MG tablet Take 1 tablet by mouth daily 30 tablet 5    famotidine (PEPCID) 20 MG tablet Take 1 tablet by mouth 2 times daily 60 tablet 5    albuterol (PROVENTIL) (2.5 MG/3ML) 0.083% nebulizer solution Take 3 mLs by nebulization every 6 hours as needed for Wheezing 120 each 3    albuterol sulfate HFA (PROVENTIL HFA) 108 (90 Base) MCG/ACT inhaler Inhale 2 puffs into the lungs every 6 hours as needed for Wheezing 1 Inhaler 3    spironolactone (ALDACTONE) 25 MG tablet Take 1 tablet by mouth daily 30 tablet 5    furosemide (LASIX) 20 MG tablet Take 1 tablet by mouth daily 30 tablet 6    atorvastatin (LIPITOR) 40 MG tablet Take 2 tablets by mouth nightly 30 tablet 5    aspirin 81 MG tablet Take 1 tablet by mouth daily 30 tablet 5       I have reviewed all pertinent PMHx, PSHx, FamHx, SocialHx, medications, and allergies and updated history as appropriate. OBJECTIVE:    VS: Ht 6' 4\" (1.93 m)   Wt 261 lb (118.4 kg)   BMI 31.77 kg/m²   General appearance: Alert, Awake, Oriented times 3, no distress  Lungs: Lungs clear to auscultation bilaterally. No rhonchi, crackles or wheezes  Heart: S1 S2  Regular rate and rhythm. No rub, murmur or gallop  Abdomen: Abdomen soft but obese, non-tender. non-distended BS normal. No masses, organomegaly, no guarding rebound or rigidity.   Extremities: No edema, Peripheral pulses palpable 2/4    ASSESSMENT/PLAN:    Dyslipidemia  -ASCVD on lipid panel 12/18 22.7%, LDL 95   -on Atorvastatin 80mg QD   -Lipid panel  7/31/19 LDL 85 from 95   >> will maintain high dose for now      Hx of Non ischemic cardiomyopathy' 2011   -S/P downgrading of dual chamber ICD to dual chamber pacemaker 4/28/17  -Last ECHO 4/17 EF 65% with Stage 1 diastolic dysfunction   - On ASA, spironolactone  25 mg daily, Lisinopril 10mg QD, Coreg 12.5 BID, Digoxin 250mcg QD, Lasix 20mg

## 2019-08-02 ENCOUNTER — TELEPHONE (OUTPATIENT)
Dept: INTERNAL MEDICINE | Age: 59
End: 2019-08-02

## 2019-08-02 DIAGNOSIS — G43.019 INTRACTABLE MIGRAINE WITHOUT AURA AND WITHOUT STATUS MIGRAINOSUS: Primary | ICD-10-CM

## 2019-08-02 DIAGNOSIS — R51.9 NONINTRACTABLE EPISODIC HEADACHE, UNSPECIFIED HEADACHE TYPE: ICD-10-CM

## 2019-08-02 NOTE — TELEPHONE ENCOUNTER
Patient's wife called in at 2:05. Patent cannot have an MRI his pacemaker is not MRI compatible. She would like her  to have a CT Brain instead.

## 2019-08-05 ENCOUNTER — NURSE ONLY (OUTPATIENT)
Dept: NON INVASIVE DIAGNOSTICS | Age: 59
End: 2019-08-05
Payer: MEDICARE

## 2019-08-05 DIAGNOSIS — I49.5 SINUS NODE DYSFUNCTION (HCC): ICD-10-CM

## 2019-08-05 DIAGNOSIS — Z95.0 PACEMAKER: Primary | ICD-10-CM

## 2019-08-05 DIAGNOSIS — I48.0 PAROXYSMAL ATRIAL FIBRILLATION (HCC): ICD-10-CM

## 2019-08-05 PROCEDURE — 93296 REM INTERROG EVL PM/IDS: CPT | Performed by: INTERNAL MEDICINE

## 2019-08-05 PROCEDURE — 93294 REM INTERROG EVL PM/LDLS PM: CPT | Performed by: INTERNAL MEDICINE

## 2019-08-16 ENCOUNTER — TELEPHONE (OUTPATIENT)
Dept: NON INVASIVE DIAGNOSTICS | Age: 59
End: 2019-08-16

## 2019-08-19 ENCOUNTER — HOSPITAL ENCOUNTER (OUTPATIENT)
Dept: CT IMAGING | Age: 59
Discharge: HOME | End: 2019-08-21
Payer: MEDICARE

## 2019-08-19 ENCOUNTER — HOSPITAL ENCOUNTER (OUTPATIENT)
Age: 59
Discharge: HOME | End: 2019-08-19
Payer: MEDICARE

## 2019-08-19 DIAGNOSIS — R51.9 NONINTRACTABLE EPISODIC HEADACHE, UNSPECIFIED HEADACHE TYPE: ICD-10-CM

## 2019-08-19 DIAGNOSIS — I48.3 TYPICAL ATRIAL FLUTTER (HCC): ICD-10-CM

## 2019-08-19 LAB
ANION GAP SERPL CALCULATED.3IONS-SCNC: 9 MMOL/L (ref 7–16)
BUN BLDV-MCNC: 18 MG/DL (ref 8–23)
CALCIUM SERPL-MCNC: 9.7 MG/DL (ref 8.6–10.2)
CHLORIDE BLD-SCNC: 102 MMOL/L (ref 98–107)
CO2: 29 MMOL/L (ref 22–29)
CREAT SERPL-MCNC: 1.3 MG/DL (ref 0.7–1.2)
GFR AFRICAN AMERICAN: >60
GFR NON-AFRICAN AMERICAN: 56 ML/MIN/1.73
GLUCOSE BLD-MCNC: 130 MG/DL (ref 74–99)
POTASSIUM SERPL-SCNC: 3.9 MMOL/L (ref 3.5–5)
SODIUM BLD-SCNC: 140 MMOL/L (ref 132–146)

## 2019-08-19 PROCEDURE — 80048 BASIC METABOLIC PNL TOTAL CA: CPT

## 2019-08-19 PROCEDURE — 6360000004 HC RX CONTRAST MEDICATION: Performed by: RADIOLOGY

## 2019-08-19 PROCEDURE — 2580000003 HC RX 258: Performed by: RADIOLOGY

## 2019-08-19 PROCEDURE — 36415 COLL VENOUS BLD VENIPUNCTURE: CPT

## 2019-08-19 PROCEDURE — 70470 CT HEAD/BRAIN W/O & W/DYE: CPT

## 2019-08-19 RX ORDER — SODIUM CHLORIDE 0.9 % (FLUSH) 0.9 %
10 SYRINGE (ML) INJECTION ONCE
Status: COMPLETED | OUTPATIENT
Start: 2019-08-19 | End: 2019-08-19

## 2019-08-19 RX ADMIN — IOPAMIDOL 90 ML: 755 INJECTION, SOLUTION INTRAVENOUS at 08:44

## 2019-08-19 RX ADMIN — Medication 10 ML: at 08:44

## 2019-08-26 ENCOUNTER — TELEPHONE (OUTPATIENT)
Dept: ORTHOPEDIC SURGERY | Age: 59
End: 2019-08-26

## 2019-08-26 DIAGNOSIS — M17.0 PRIMARY OSTEOARTHRITIS OF BOTH KNEES: Primary | ICD-10-CM

## 2019-09-10 ENCOUNTER — OFFICE VISIT (OUTPATIENT)
Dept: ORTHOPEDIC SURGERY | Age: 59
End: 2019-09-10
Payer: MEDICARE

## 2019-09-10 ENCOUNTER — HOSPITAL ENCOUNTER (OUTPATIENT)
Dept: GENERAL RADIOLOGY | Age: 59
Discharge: HOME | End: 2019-09-12
Payer: MEDICARE

## 2019-09-10 VITALS
HEIGHT: 76 IN | HEART RATE: 89 BPM | SYSTOLIC BLOOD PRESSURE: 135 MMHG | DIASTOLIC BLOOD PRESSURE: 95 MMHG | WEIGHT: 259 LBS | BODY MASS INDEX: 31.54 KG/M2

## 2019-09-10 DIAGNOSIS — M25.361 KNEE INSTABILITY, RIGHT: ICD-10-CM

## 2019-09-10 DIAGNOSIS — M17.0 PRIMARY OSTEOARTHRITIS OF BOTH KNEES: ICD-10-CM

## 2019-09-10 PROCEDURE — 99204 OFFICE O/P NEW MOD 45 MIN: CPT | Performed by: ORTHOPAEDIC SURGERY

## 2019-09-10 PROCEDURE — G8417 CALC BMI ABV UP PARAM F/U: HCPCS | Performed by: ORTHOPAEDIC SURGERY

## 2019-09-10 PROCEDURE — 3017F COLORECTAL CA SCREEN DOC REV: CPT | Performed by: ORTHOPAEDIC SURGERY

## 2019-09-10 PROCEDURE — 99202 OFFICE O/P NEW SF 15 MIN: CPT

## 2019-09-10 PROCEDURE — 73560 X-RAY EXAM OF KNEE 1 OR 2: CPT

## 2019-09-10 PROCEDURE — 4004F PT TOBACCO SCREEN RCVD TLK: CPT | Performed by: ORTHOPAEDIC SURGERY

## 2019-09-10 PROCEDURE — G8427 DOCREV CUR MEDS BY ELIG CLIN: HCPCS | Performed by: ORTHOPAEDIC SURGERY

## 2019-10-04 ENCOUNTER — TELEPHONE (OUTPATIENT)
Dept: ADMINISTRATIVE | Age: 59
End: 2019-10-04

## 2019-11-04 ENCOUNTER — NURSE ONLY (OUTPATIENT)
Dept: NON INVASIVE DIAGNOSTICS | Age: 59
End: 2019-11-04
Payer: MEDICARE

## 2019-11-04 DIAGNOSIS — I49.5 SINUS NODE DYSFUNCTION (HCC): ICD-10-CM

## 2019-11-04 DIAGNOSIS — Z95.0 PACEMAKER: Primary | ICD-10-CM

## 2019-11-04 PROCEDURE — 93296 REM INTERROG EVL PM/IDS: CPT | Performed by: INTERNAL MEDICINE

## 2019-11-04 PROCEDURE — 93294 REM INTERROG EVL PM/LDLS PM: CPT | Performed by: INTERNAL MEDICINE

## 2019-11-15 ENCOUNTER — TELEPHONE (OUTPATIENT)
Dept: NON INVASIVE DIAGNOSTICS | Age: 59
End: 2019-11-15

## 2019-11-18 DIAGNOSIS — E78.5 DYSLIPIDEMIA: ICD-10-CM

## 2019-11-18 RX ORDER — ATORVASTATIN CALCIUM 40 MG/1
TABLET, FILM COATED ORAL
Qty: 180 TABLET | Refills: 0 | Status: SHIPPED
Start: 2019-11-18 | End: 2020-03-16 | Stop reason: SDUPTHER

## 2019-11-22 DIAGNOSIS — I42.0 DILATED CARDIOMYOPATHY (HCC): ICD-10-CM

## 2019-11-25 RX ORDER — SPIRONOLACTONE 25 MG/1
TABLET ORAL
Qty: 90 TABLET | Refills: 0 | Status: SHIPPED
Start: 2019-11-25 | End: 2020-02-26 | Stop reason: SDUPTHER

## 2019-11-28 DIAGNOSIS — G43.019 INTRACTABLE MIGRAINE WITHOUT AURA AND WITHOUT STATUS MIGRAINOSUS: ICD-10-CM

## 2019-11-28 DIAGNOSIS — K21.9 GASTROESOPHAGEAL REFLUX DISEASE, ESOPHAGITIS PRESENCE NOT SPECIFIED: ICD-10-CM

## 2019-11-28 RX ORDER — FAMOTIDINE 20 MG/1
TABLET, FILM COATED ORAL
Qty: 180 TABLET | Refills: 3 | Status: CANCELLED | OUTPATIENT
Start: 2019-11-28

## 2019-12-02 RX ORDER — SUMATRIPTAN 25 MG/1
TABLET, FILM COATED ORAL
Qty: 10 TABLET | Refills: 0 | Status: SHIPPED
Start: 2019-12-02 | End: 2020-09-17

## 2019-12-02 RX ORDER — FAMOTIDINE 20 MG/1
TABLET, FILM COATED ORAL
Qty: 180 TABLET | Refills: 0 | Status: SHIPPED
Start: 2019-12-02 | End: 2020-02-26 | Stop reason: SDUPTHER

## 2020-02-07 ENCOUNTER — NURSE ONLY (OUTPATIENT)
Dept: INTERNAL MEDICINE | Age: 60
End: 2020-02-07
Payer: MEDICARE

## 2020-02-07 VITALS — TEMPERATURE: 98.2 F

## 2020-02-07 PROCEDURE — 90686 IIV4 VACC NO PRSV 0.5 ML IM: CPT

## 2020-02-07 PROCEDURE — 6360000002 HC RX W HCPCS

## 2020-02-07 PROCEDURE — G0008 ADMIN INFLUENZA VIRUS VAC: HCPCS

## 2020-02-07 NOTE — PROGRESS NOTES
Vaccine Information Sheet, \"Influenza - Inactivated\"  given to Mo Curry, or parent/legal guardian of  Mo Curry and verbalized understanding. Patient responses:    Have you ever had a reaction to a flu vaccine? Yes  Do you have any current illness? No  Have you ever had Guillian Vancouver Syndrome? No  Do you have a serious allergy to any of the follow: Neomycin, Polymyxin, Thimerosal, eggs or egg products? No    Flu vaccine given per order. Please see immunization tab. Risks and benefits explained. Current VIS given.

## 2020-02-17 RX ORDER — LISINOPRIL 10 MG/1
TABLET ORAL
Qty: 90 TABLET | Refills: 1 | Status: SHIPPED
Start: 2020-02-17 | End: 2020-09-02 | Stop reason: SDUPTHER

## 2020-02-17 RX ORDER — DIGOXIN 250 MCG
250 TABLET ORAL DAILY
Qty: 90 TABLET | Refills: 3 | Status: SHIPPED
Start: 2020-02-17 | End: 2021-02-28 | Stop reason: SDUPTHER

## 2020-02-26 ENCOUNTER — OFFICE VISIT (OUTPATIENT)
Dept: INTERNAL MEDICINE | Age: 60
End: 2020-02-26
Payer: MEDICARE

## 2020-02-26 VITALS
HEIGHT: 76 IN | BODY MASS INDEX: 32.39 KG/M2 | DIASTOLIC BLOOD PRESSURE: 69 MMHG | HEART RATE: 72 BPM | WEIGHT: 266 LBS | SYSTOLIC BLOOD PRESSURE: 119 MMHG | RESPIRATION RATE: 16 BRPM | TEMPERATURE: 98.7 F

## 2020-02-26 PROBLEM — G43.019 INTRACTABLE MIGRAINE WITHOUT AURA AND WITHOUT STATUS MIGRAINOSUS: Status: ACTIVE | Noted: 2020-02-26

## 2020-02-26 PROBLEM — H54.62 VISION LOSS OF LEFT EYE: Status: ACTIVE | Noted: 2020-02-26

## 2020-02-26 PROBLEM — K21.9 GASTROESOPHAGEAL REFLUX DISEASE: Status: ACTIVE | Noted: 2020-02-26

## 2020-02-26 PROBLEM — R51.9 NONINTRACTABLE EPISODIC HEADACHE: Status: ACTIVE | Noted: 2020-02-26

## 2020-02-26 PROCEDURE — 3017F COLORECTAL CA SCREEN DOC REV: CPT | Performed by: INTERNAL MEDICINE

## 2020-02-26 PROCEDURE — G0438 PPPS, INITIAL VISIT: HCPCS | Performed by: INTERNAL MEDICINE

## 2020-02-26 PROCEDURE — G8482 FLU IMMUNIZE ORDER/ADMIN: HCPCS | Performed by: INTERNAL MEDICINE

## 2020-02-26 PROCEDURE — 99213 OFFICE O/P EST LOW 20 MIN: CPT | Performed by: INTERNAL MEDICINE

## 2020-02-26 PROCEDURE — 3046F HEMOGLOBIN A1C LEVEL >9.0%: CPT | Performed by: INTERNAL MEDICINE

## 2020-02-26 RX ORDER — SPIRONOLACTONE 25 MG/1
25 TABLET ORAL DAILY
Qty: 90 TABLET | Refills: 1 | Status: SHIPPED
Start: 2020-02-26 | End: 2020-04-07 | Stop reason: SDUPTHER

## 2020-02-26 RX ORDER — CYCLOBENZAPRINE HCL 5 MG
5 TABLET ORAL NIGHTLY PRN
Qty: 30 TABLET | Refills: 0 | Status: SHIPPED | OUTPATIENT
Start: 2020-02-26 | End: 2020-03-07

## 2020-02-26 RX ORDER — FAMOTIDINE 20 MG/1
20 TABLET, FILM COATED ORAL 2 TIMES DAILY
Qty: 180 TABLET | Refills: 3 | Status: SHIPPED
Start: 2020-02-26 | End: 2020-05-04 | Stop reason: SDUPTHER

## 2020-02-26 SDOH — ECONOMIC STABILITY: FOOD INSECURITY: WITHIN THE PAST 12 MONTHS, THE FOOD YOU BOUGHT JUST DIDN'T LAST AND YOU DIDN'T HAVE MONEY TO GET MORE.: NEVER TRUE

## 2020-02-26 SDOH — ECONOMIC STABILITY: FOOD INSECURITY: WITHIN THE PAST 12 MONTHS, YOU WORRIED THAT YOUR FOOD WOULD RUN OUT BEFORE YOU GOT MONEY TO BUY MORE.: NEVER TRUE

## 2020-02-26 SDOH — ECONOMIC STABILITY: INCOME INSECURITY: HOW HARD IS IT FOR YOU TO PAY FOR THE VERY BASICS LIKE FOOD, HOUSING, MEDICAL CARE, AND HEATING?: SOMEWHAT HARD

## 2020-02-26 ASSESSMENT — PATIENT HEALTH QUESTIONNAIRE - PHQ9
SUM OF ALL RESPONSES TO PHQ QUESTIONS 1-9: 0
SUM OF ALL RESPONSES TO PHQ QUESTIONS 1-9: 0

## 2020-02-26 ASSESSMENT — LIFESTYLE VARIABLES
HAVE YOU OR SOMEONE ELSE BEEN INJURED AS A RESULT OF YOUR DRINKING: 0
HOW OFTEN DO YOU HAVE A DRINK CONTAINING ALCOHOL: 1
AUDIT-C TOTAL SCORE: 4
HOW OFTEN DURING THE LAST YEAR HAVE YOU FAILED TO DO WHAT WAS NORMALLY EXPECTED FROM YOU BECAUSE OF DRINKING: 0
HOW OFTEN DURING THE LAST YEAR HAVE YOU NEEDED AN ALCOHOLIC DRINK FIRST THING IN THE MORNING TO GET YOURSELF GOING AFTER A NIGHT OF HEAVY DRINKING: 0
HOW OFTEN DO YOU HAVE SIX OR MORE DRINKS ON ONE OCCASION: 3
AUDIT TOTAL SCORE: 4
HOW OFTEN DURING THE LAST YEAR HAVE YOU FOUND THAT YOU WERE NOT ABLE TO STOP DRINKING ONCE YOU HAD STARTED: 0
HOW OFTEN DURING THE LAST YEAR HAVE YOU HAD A FEELING OF GUILT OR REMORSE AFTER DRINKING: 0
HAS A RELATIVE, FRIEND, DOCTOR, OR ANOTHER HEALTH PROFESSIONAL EXPRESSED CONCERN ABOUT YOUR DRINKING OR SUGGESTED YOU CUT DOWN: 0
HOW OFTEN DURING THE LAST YEAR HAVE YOU BEEN UNABLE TO REMEMBER WHAT HAPPENED THE NIGHT BEFORE BECAUSE YOU HAD BEEN DRINKING: 0
HOW MANY STANDARD DRINKS CONTAINING ALCOHOL DO YOU HAVE ON A TYPICAL DAY: 0

## 2020-02-26 NOTE — PROGRESS NOTES
needed for Wheezing Yes Casey Hodges MD   furosemide (LASIX) 20 MG tablet Take 1 tablet by mouth daily Yes Casey Hodges MD   aspirin 81 MG tablet Take 1 tablet by mouth daily Yes Sravanthi Dumas MD   montelukast (SINGULAIR) 10 MG tablet Take 1 tablet by mouth nightly Yes Casey Hodges MD   carvedilol (COREG) 12.5 MG tablet Take 1 tablet by mouth 2 times daily (with meals) Yes Loren Lazar MD   Misc. Devices MISC 1 each by Does not apply route once as needed for up to 1 dose. Nebulizer and tubing. Yes Patel Damian DO   SUMAtriptan (IMITREX) 25 MG tablet take 2 tablets by mouth if needed for migraines  Patient not taking: Reported on 2/26/2020  Cherie Hurst DO       Past Medical History:   Diagnosis Date    Arthritis     Atrial fibrillation ([address]     Bruising tendency ([address]     Cardiomyopathy ([address]     probably viral, LV systolic dysfunction EF 48% July 2011    Cerebral artery occlusion with cerebral infarction [facility])     TIA    CHF (congestive heart failure) ([address]     ef 20    Diabetes mellitus ([address]     Headache(784.0)     Heart trouble July, 2011, February, 2012    Hospitalized (WHAT TYPE?)    History of blood transfusion     History of frequent ear infections     Hx of blood clots     HEART    Hyperlipidemia     Hypertension     Memory loss     Recurrent infections 1985, 1995, 2005    ASK WHERE?     Speech problem        Past Surgical History:   Procedure Laterality Date    A-V CARDIAC PACEMAKER INSERTION Left 04/28/2017    Dual ICD removed Dual chamber Medtronic Pacer insertion by Dr Ramonita Negro  12/13/2013    aflutter ablation    CARDIAC DEFIBRILLATOR PLACEMENT  10/27/2011    Dr. Jade Soriano, dual chamber  Medtronic    CARDIOVERSION  11-    Cardioversion   Dr. Wren Shoulder    right    SINUS SURGERY      polyps removed     TONSILLECTOMY      TRANSESOPHAGEAL ECHOCARDIOGRAM  12/8/2011 Factor Screenings with Interventions:     Fall Risk:  Timed Up and Go Test > 12 seconds? (Complete if either Fall Risk answers are Yes): no  2 or more falls in past year?: (!) yes  Fall with injury in past year?: no  Fall Risk Interventions:    · Home safety tips provided    Substance Abuse:  Social History     Tobacco History     Smoking Status  Current Every Day Smoker Smoking Frequency  0.5 packs/day for 36 years (18 pk yrs) Smoking Tobacco Type  Cigarettes    Smokeless Tobacco Use  Never Used    Tobacco Comment  currently . 025 ppd          Alcohol History     Alcohol Use Status  Yes Drinks/Week  0 Standard drinks or equivalent per week Amount  0.0 standard drinks of alcohol/wk Comment  rarely          Drug Use     Drug Use Status  Not Currently Types  Cocaine, Marijuana, Methamphetamines, Opiates  Comment  past use in his 19's          Sexual Activity     Sexually Active  Not Asked               Audit Questionnaire: Screen for Alcohol Misuse  How often do you have a drink containing alcohol?: Monthly or less  How many standard drinks containing alcohol do you have on a typical day when drinking?: One or two  How often do you have six or more drinks on one occasion?: Weekly  Audit-C Score: 4  During the past year, how often have you found that you were not able to stop drinking once you had started?: Never  During the past year, how often have you failed to do what was normally expected of you because of drinking?: Never  During the past year, how often have you needed a drink in the morning to get yourself going after a heavy drinking session?: Never  During the past year, how often have you had a feeling of guilt or remorse after drinking?: Never  During the past year, have you been unable to remember what happened the night before because you had been drinking?: Never  Have you or someone else been injured as a result of your drinking?: No  Has a relative or friend, doctor or health worker been concerned about further evaluation/treatment for this issue    Personalized Preventive Plan   Current Health Maintenance Status  Immunization History   Administered Date(s) Administered    Influenza 09/29/2011, 10/12/2012, 10/09/2013    Influenza Virus Vaccine 11/05/2014, 10/15/2015    Influenza, Quadv, IM, (6 mo and older Fluzone, Flulaval, Fluarix and 3 yrs and older Afluria) 09/27/2017    Influenza, Quadv, IM, PF (6 mo and older Fluzone, Flulaval, Fluarix, and 3 yrs and older Afluria) 12/19/2018, 02/07/2020    Influenza, Triv, 3 Years and older, IM (Afluria (5 yrs and older) 12/02/2016    Pneumococcal Polysaccharide ([id number]) 06/16/2017    Tdap (Boostrix, Adacel) 01/31/2019        Health Maintenance   Topic Date Due    Hepatitis B vaccine (1 of 3 - Risk 3-dose series) 07/07/1978    Shingles Vaccine (1 of 2) 07/07/2009    Diabetic retinal exam  10/15/2016    Annual Wellness Visit (AWV)  06/05/2019    Diabetic foot exam  12/19/2019    Diabetic microalbuminuria test  12/19/2019    Lipid screen  07/31/2020    A1C test (Diabetic or Prediabetic)  08/01/2020    Potassium monitoring  08/19/2020    Creatinine monitoring  08/19/2020    Colon cancer screen colonoscopy  02/02/2026    DTaP/Tdap/Td vaccine (2 - Td) 01/31/2029    Flu vaccine  Completed    Pneumococcal 0-64 years Vaccine  Completed    Hepatitis C screen  Completed    HIV screen  Completed    Hepatitis A vaccine  Aged Out    Hib vaccine  Aged Out    Meningococcal (ACWY) vaccine  Aged Out     Recommendations for [place] Due: see orders and patient instructions/AVS.  .   Recommended screening schedule for the next 5-10 years is provided to the patient in written form: see Patient Instructions/AVS.    BP today 119/69     Dyslipidemia  -ASCVD on lipid panel 12/18 22.7%, LDL 85  -on Atorvastatin 80mg QD   >> recheck lipid     Hx of Non ischemic cardiomyopathy' 2011   -S/P downgrading of dual chamber ICD to dual chamber pacemaker 4/28/17  -Last ECHO 4/17 EF 65% with Stage 1 diastolic dysfunction   - On ASA, spironolactone  25 mg daily, Lisinopril 10mg QD, Coreg 12.5 BID, Digoxin 250mcg QD, Lasix 20mg QD.   -Lexiscan MPS, 1/11/2017, Normal perfusion.  EF 50%    -Follows with Dr. Mushtaq Lamar and Dr. Mikie Todd  >> will set up with new cardiologist Dr. Geovanna Cohn     Paroxysmal atrial flutter  -S/P AV kamar ablation 12/2013  -[id number] Score: 4  -on Coreg 12.5mg BID and Digoxin 250mcg QD   -[address] held because by Paxton Anand  - follows Dr Mushtaq Lamar EP marcela visit 7/31/19 no arrythmias noted, not on [address]  >> per patient  had events of arrythmia, ?afib, EP aware will continue to monitor, scheduled to see remote transmission on 2/29, to see Dr. Mushtaq Lamar on 4/2020  >> Due to possible afib and patient not on [facility], tried to reach out to Dr. Mushtaq Lamar clinic, message left, advised patient to set up an earlier appointment with him     Type 2 diabetes mellitus with complication, without long-term current use of insulin  -Last Hemoglobin A1C 6.7>> 6.4 8/1/19   - on Metformin 1000mg BID  >> recheck hgbA1c     Seasonal allergies  - PFT 3/27/2015 : FEV1 87% predicted , + post bronchodilator response 14% ? FEV1 volume change. 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 Retinal infarct, ?  CVA     Intractable migraine without aura and without status migrainosus / Chronic headache \  - sharp headaches 10/10 lasting fro about 30 seconds   -   topiramate 100 MG tablet daily   >> for neurology consult --> reordered      Gastroesophageal reflux disease, esophagitis presence not specified  -     famotidine 20 MG tablet; BID      OA  -follows orthopedics  - knee brace ordered per ortho note      Smoking  -Smokes 1PPD since 27+ years; currently 1/2PPD   -Pre contemplative at this time   -Currently vaping      Health care maintenance   -HIV

## 2020-03-16 ENCOUNTER — TELEPHONE (OUTPATIENT)
Dept: INTERNAL MEDICINE | Age: 60
End: 2020-03-16

## 2020-03-16 RX ORDER — ATORVASTATIN CALCIUM 40 MG/1
TABLET, FILM COATED ORAL
Qty: 180 TABLET | Refills: 0 | Status: SHIPPED
Start: 2020-03-16 | End: 2020-06-17 | Stop reason: SDUPTHER

## 2020-03-25 ENCOUNTER — TELEPHONE (OUTPATIENT)
Dept: ENT CLINIC | Age: 60
End: 2020-03-25

## 2020-03-25 RX ORDER — FLUTICASONE PROPIONATE 50 MCG
2 SPRAY, SUSPENSION (ML) NASAL DAILY
Qty: 1 BOTTLE | Refills: 3 | Status: CANCELLED | OUTPATIENT
Start: 2020-03-25

## 2020-03-25 RX ORDER — MONTELUKAST SODIUM 10 MG/1
10 TABLET ORAL NIGHTLY
Qty: 30 TABLET | Refills: 5 | Status: CANCELLED | OUTPATIENT
Start: 2020-03-25

## 2020-03-25 RX ORDER — AZELASTINE 1 MG/ML
2 SPRAY, METERED NASAL DAILY
Qty: 1 BOTTLE | Refills: 3 | Status: CANCELLED | OUTPATIENT
Start: 2020-03-25

## 2020-03-26 ENCOUNTER — TELEPHONE (OUTPATIENT)
Dept: ENT CLINIC | Age: 60
End: 2020-03-26

## 2020-03-26 RX ORDER — FLUTICASONE PROPIONATE 50 MCG
1 SPRAY, SUSPENSION (ML) NASAL DAILY
Qty: 1 BOTTLE | Refills: 3 | Status: SHIPPED
Start: 2020-03-26 | End: 2020-08-17 | Stop reason: SDUPTHER

## 2020-03-26 RX ORDER — AZELASTINE 1 MG/ML
1 SPRAY, METERED NASAL 2 TIMES DAILY
Qty: 1 BOTTLE | Refills: 3 | Status: SHIPPED
Start: 2020-03-26 | End: 2020-09-17 | Stop reason: SDUPTHER

## 2020-03-26 RX ORDER — MONTELUKAST SODIUM 10 MG/1
10 TABLET ORAL NIGHTLY
Qty: 30 TABLET | Refills: 3 | Status: SHIPPED
Start: 2020-03-26 | End: 2020-08-17 | Stop reason: SDUPTHER

## 2020-03-27 ENCOUNTER — NURSE ONLY (OUTPATIENT)
Dept: NON INVASIVE DIAGNOSTICS | Age: 60
End: 2020-03-27
Payer: MEDICARE

## 2020-03-27 PROCEDURE — 93294 REM INTERROG EVL PM/LDLS PM: CPT | Performed by: INTERNAL MEDICINE

## 2020-03-27 PROCEDURE — 93296 REM INTERROG EVL PM/IDS: CPT | Performed by: INTERNAL MEDICINE

## 2020-04-06 ENCOUNTER — TELEPHONE (OUTPATIENT)
Dept: NON INVASIVE DIAGNOSTICS | Age: 60
End: 2020-04-06

## 2020-04-06 NOTE — TELEPHONE ENCOUNTER
We have received your remote transmission. Our staff will contact you if there is anything that needs to be discussed. Your next appointment is April 29, 2020 for remote transmission.  Patient's wife verbalized understanding

## 2020-04-07 RX ORDER — SPIRONOLACTONE 25 MG/1
25 TABLET ORAL DAILY
Qty: 90 TABLET | Refills: 0 | Status: SHIPPED
Start: 2020-04-07 | End: 2020-07-07 | Stop reason: SDUPTHER

## 2020-04-07 RX ORDER — SPIRONOLACTONE 25 MG/1
25 TABLET ORAL DAILY
Qty: 90 TABLET | Refills: 1 | Status: CANCELLED | OUTPATIENT
Start: 2020-04-07

## 2020-04-07 NOTE — TELEPHONE ENCOUNTER
Last Appointment:  2/26/2020  Future Appointments   Date Time Provider Pratik Garcia   4/13/2020 10:00 AM Grace Peña MD [facility] NEURO [facility]   4/29/2020  7:50 AM SCHEDULE, REMOTE CHECK [place] ELECTRO PHYS [facility]   5/4/2020  9:30 AM Yasmine Tinajero MD [facility]   6/2/2020  8:30 AM DO Nathaly Kaplan ENT [facility]   6/29/2020  7:55 AM SCHEDULE, REMOTE CHECK [place] ELECTRO PHYS [facility]

## 2020-04-07 NOTE — TELEPHONE ENCOUNTER
Pt wife Sabrina Amaya called into [facility]. E's IM ACC, states that pt is out of SPIRONOLAACTONE 25MG. For past 3 days and needs refill. States left message on Refill line yesterday. Refused to leave another message. Also states that pharmacy has changed to [address] [place] [address] Street - Phone [phone]. Informed wife that message will be forwarded to clinical staff.      .Electronically signed by Alejandro Gilmore on 4/7/20 at 2:42 PM EDT

## 2020-04-14 ENCOUNTER — OFFICE VISIT (OUTPATIENT)
Dept: NEUROLOGY | Age: 60
End: 2020-04-14
Payer: MEDICARE

## 2020-04-14 VITALS
TEMPERATURE: 98 F | DIASTOLIC BLOOD PRESSURE: 71 MMHG | WEIGHT: 260 LBS | BODY MASS INDEX: 31.66 KG/M2 | HEART RATE: 78 BPM | HEIGHT: 76 IN | OXYGEN SATURATION: 97 % | SYSTOLIC BLOOD PRESSURE: 104 MMHG

## 2020-04-14 PROCEDURE — 99205 OFFICE O/P NEW HI 60 MIN: CPT | Performed by: PSYCHIATRY & NEUROLOGY

## 2020-04-14 RX ORDER — BACLOFEN 20 MG/1
20 TABLET ORAL 2 TIMES DAILY
Qty: 60 TABLET | Refills: 5 | Status: SHIPPED
Start: 2020-04-14 | End: 2020-11-29 | Stop reason: SDUPTHER

## 2020-04-14 NOTE — PROGRESS NOTES
was 6.4. A previous B12 level was 222. CBC with differential was unremarkable in the past.  Hepatitis panel 4 years ago was unrevealing. A CT scan of his head last year was normal.    This individual's neurological examination is unrevealing. As before, I feel his headaches are primarily tension type spells, likely aggravated by obstructive sleep apnea. I doubt any significant component of vascular headaches. Therefore, topiramate was discontinued. I once again started baclofen 20 mg at night, to be increased to twice daily if necessary. Sleep studies were again in order. 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 He is stable medically despite his multiple comorbidities. Fortunately, his viral cardiomyopathy had improved. He will return in 4 months. Baclofen was started at 20 mg at night. Sleep studies will, hopefully, be scheduled soon. He report back in 3 to 4 weeks. He will call at any time if problems arise. I spent 80 minutes with the patient with over 50 % spent in counseling and disease management. All patient issues were addressed and all questions were answered.

## 2020-04-15 RX ORDER — FUROSEMIDE 20 MG/1
20 TABLET ORAL DAILY
Qty: 30 TABLET | Refills: 0 | Status: SHIPPED
Start: 2020-04-15 | End: 2020-09-13 | Stop reason: SDUPTHER

## 2020-04-16 ENCOUNTER — TELEPHONE (OUTPATIENT)
Dept: NEUROLOGY | Age: 60
End: 2020-04-16

## 2020-05-01 ENCOUNTER — HOSPITAL ENCOUNTER (OUTPATIENT)
Age: 60
Discharge: HOME | End: 2020-05-01
Payer: MEDICARE

## 2020-05-01 ENCOUNTER — TELEPHONE (OUTPATIENT)
Dept: INTERNAL MEDICINE | Age: 60
End: 2020-05-01

## 2020-05-01 LAB
ALBUMIN SERPL-MCNC: 4.4 G/DL (ref 3.5–5.2)
ALP BLD-CCNC: 42 U/L (ref 40–129)
ALT SERPL-CCNC: 40 U/L (ref 0–40)
ANION GAP SERPL CALCULATED.3IONS-SCNC: 13 MMOL/L (ref 7–16)
AST SERPL-CCNC: 28 U/L (ref 0–39)
BASOPHILS ABSOLUTE: 0.04 E9/L (ref 0–0.2)
BASOPHILS RELATIVE PERCENT: 0.4 % (ref 0–2)
BILIRUB SERPL-MCNC: 0.4 MG/DL (ref 0–1.2)
BUN BLDV-MCNC: 15 MG/DL (ref 8–23)
CALCIUM SERPL-MCNC: 10.3 MG/DL (ref 8.6–10.2)
CHLORIDE BLD-SCNC: 102 MMOL/L (ref 98–107)
CHOLESTEROL, TOTAL: 142 MG/DL (ref 0–199)
CO2: 27 MMOL/L (ref 22–29)
CREAT SERPL-MCNC: 1.3 MG/DL (ref 0.7–1.2)
CREATININE URINE: 50 MG/DL (ref 40–278)
EOSINOPHILS ABSOLUTE: 0.12 E9/L (ref 0.05–0.5)
EOSINOPHILS RELATIVE PERCENT: 1.1 % (ref 0–6)
GFR AFRICAN AMERICAN: >60
GFR NON-AFRICAN AMERICAN: 56 ML/MIN/1.73
GLUCOSE BLD-MCNC: 136 MG/DL (ref 74–99)
HBA1C MFR BLD: 6.9 % (ref 4–5.6)
HCT VFR BLD CALC: 43.6 % (ref 37–54)
HDLC SERPL-MCNC: 39 MG/DL
HEMOGLOBIN: 14.9 G/DL (ref 12.5–16.5)
IMMATURE GRANULOCYTES #: 0.04 E9/L
IMMATURE GRANULOCYTES %: 0.4 % (ref 0–5)
LDL CHOLESTEROL CALCULATED: 81 MG/DL (ref 0–99)
LYMPHOCYTES ABSOLUTE: 4.3 E9/L (ref 1.5–4)
LYMPHOCYTES RELATIVE PERCENT: 40.3 % (ref 20–42)
MCH RBC QN AUTO: 32.9 PG (ref 26–35)
MCHC RBC AUTO-ENTMCNC: 34.2 % (ref 32–34.5)
MCV RBC AUTO: 96.2 FL (ref 80–99.9)
MICROALBUMIN UR-MCNC: 30.6 MG/L
MICROALBUMIN/CREAT UR-RTO: 61.2 (ref 0–30)
MONOCYTES ABSOLUTE: 1.04 E9/L (ref 0.1–0.95)
MONOCYTES RELATIVE PERCENT: 9.8 % (ref 2–12)
NEUTROPHILS ABSOLUTE: 5.12 E9/L (ref 1.8–7.3)
NEUTROPHILS RELATIVE PERCENT: 48 % (ref 43–80)
PDW BLD-RTO: 13 FL (ref 11.5–15)
PLATELET # BLD: 217 E9/L (ref 130–450)
PMV BLD AUTO: 10.4 FL (ref 7–12)
POTASSIUM SERPL-SCNC: 4.5 MMOL/L (ref 3.5–5)
RBC # BLD: 4.53 E12/L (ref 3.8–5.8)
SODIUM BLD-SCNC: 142 MMOL/L (ref 132–146)
TOTAL PROTEIN: 7.1 G/DL (ref 6.4–8.3)
TRIGL SERPL-MCNC: 108 MG/DL (ref 0–149)
VLDLC SERPL CALC-MCNC: 22 MG/DL
WBC # BLD: 10.7 E9/L (ref 4.5–11.5)

## 2020-05-01 PROCEDURE — 85025 COMPLETE CBC W/AUTO DIFF WBC: CPT

## 2020-05-01 PROCEDURE — 82044 UR ALBUMIN SEMIQUANTITATIVE: CPT

## 2020-05-01 PROCEDURE — 36415 COLL VENOUS BLD VENIPUNCTURE: CPT

## 2020-05-01 PROCEDURE — 82570 ASSAY OF URINE CREATININE: CPT

## 2020-05-01 PROCEDURE — 80053 COMPREHEN METABOLIC PANEL: CPT

## 2020-05-01 PROCEDURE — 80061 LIPID PANEL: CPT

## 2020-05-01 PROCEDURE — 83036 HEMOGLOBIN GLYCOSYLATED A1C: CPT

## 2020-05-04 ENCOUNTER — TELEMEDICINE (OUTPATIENT)
Dept: INTERNAL MEDICINE | Age: 60
End: 2020-05-04
Payer: MEDICARE

## 2020-05-04 PROCEDURE — G8427 DOCREV CUR MEDS BY ELIG CLIN: HCPCS | Performed by: INTERNAL MEDICINE

## 2020-05-04 PROCEDURE — 99213 OFFICE O/P EST LOW 20 MIN: CPT | Performed by: INTERNAL MEDICINE

## 2020-05-04 PROCEDURE — 3044F HG A1C LEVEL LT 7.0%: CPT | Performed by: INTERNAL MEDICINE

## 2020-05-04 PROCEDURE — 2022F DILAT RTA XM EVC RTNOPTHY: CPT | Performed by: INTERNAL MEDICINE

## 2020-05-04 PROCEDURE — 3017F COLORECTAL CA SCREEN DOC REV: CPT | Performed by: INTERNAL MEDICINE

## 2020-05-04 RX ORDER — FAMOTIDINE 20 MG/1
20 TABLET, FILM COATED ORAL 2 TIMES DAILY
Qty: 180 TABLET | Refills: 3 | Status: SHIPPED
Start: 2020-05-04 | End: 2020-06-04 | Stop reason: SDUPTHER

## 2020-05-04 NOTE — PROGRESS NOTES
today around 9 AM lasting for 5 mins, never had it that long before   - sent strips to Dr. Daniella Blair  - currently asymptomtatic   >. Advised to got to ED if worsening     Dyslipidemia  ASCVD: 18.1%  -Lipid panel total luci 142, tri 108, HDL 39, LDL 81  -on Atorvastatin 80mg QD       Hx of Non ischemic cardiomyopathy' 2011   -S/P downgrading of dual chamber ICD to dual chamber pacemaker 4/28/17  -Last ECHO 4/17 EF 65% with Stage 1 diastolic dysfunction   - On ASA, spironolactone  25 mg daily, Lisinopril 10mg QD, Coreg 12.5 BID, Digoxin 250mcg QD, Lasix 20mg QD.   -Lexiscan MPS, 1/11/2017, Normal perfusion.  EF 50%    -Follows with Dr. Daniella Blair and Dr. Jhon Dunaway  >> will set up with new cardiologist Dr. Epifanio Concepcion     Paroxysmal atrial flutter  -S/P AV kamar ablation 12/2013  -[id number] Score: 4  -on Coreg 12.5mg BID and Digoxin 250mcg QD   -[address] held because by Bisi Jules  - follows Dr Monreal Cuff visit 7/31/19 no arrythmias noted, not on [address]       TYPE 2 DM, with microalbuminuria   -Last Hemoglobin A1C 6.7>> 6.4 8/1/19 >>  5/1/2020 6.9   - microalbumin 6.1, creatinine 1.3  - on Metformin 1000mg BID  >>hemgolobin A1c 6.9 higher than before, states that he has been baking a lot these days and has not been checking blood sugar at home   >> advised to check BS daily and record  >> recheck HgbA1c    CKD, stage 3A  Crea 1.3 , GFR 56   + microalbuminuria   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 FEV1 volume change. 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 Retinal infarct, ?  CVA     Intractable migraine without aura and without status migrainosus / Chronic headache \  - sharp headaches 10/10 lasting fro about 30 seconds   -   topiramate 100 MG tablet daily --off   >> seen by neurology states it maybe tension headache , started on baclofen 20 mg   - neurology ordered  sleep study      Gastroesophageal reflux disease, esophagitis presence not specified  -    on famotidine 20 MG tablet; BID      OA  -follows orthopedics  - knee brace ordered per ortho note      Smoking  -Smokes 1PPD since 27+ years; currently 1/2PPD   -Pre contemplative at this time   -Currently vaping --> have tried to taper use     Health care maintenance   -HIV screen and Hep C negative   -Diabetic foot exam today WNL; he does have calluses without any nidus for infection.  Discuss podiatry referral but he is refusing at this time.  -Flu shot up to date  -Shingles script provided 2/26/2020   -Tdap 2019   -Diabetic eye exam  -colonoscopy in 2016  -Urine microalbuminuria  6.1 5/1/2020    RTC: 1 month virtual visit, for   BS check and symptom check

## 2020-05-04 NOTE — PATIENT INSTRUCTIONS
Please check your blood sugar daily and log on  Blood Sugar Log sheet   Return in 1 month with me   Get lab work done per ordered   Medication has been ordered take as directed    Call Dr. Sharon Trejo clinic regarding chest pain, if there is worsening and recurrence of chest pain please proceed to ED

## 2020-05-04 NOTE — PROGRESS NOTES
[facility] Visit was done per Dr. Manuel Jonathan concerns were addressed and answered Printed AVS mailed to pt

## 2020-06-02 ENCOUNTER — HOSPITAL ENCOUNTER (OUTPATIENT)
Age: 60
Discharge: HOME | End: 2020-06-04
Payer: MEDICARE

## 2020-06-02 ENCOUNTER — OFFICE VISIT (OUTPATIENT)
Dept: ENT CLINIC | Age: 60
End: 2020-06-02
Payer: MEDICARE

## 2020-06-02 VITALS — WEIGHT: 268 LBS | BODY MASS INDEX: 32.63 KG/M2 | HEIGHT: 76 IN

## 2020-06-02 LAB
ALBUMIN SERPL-MCNC: 4.5 G/DL (ref 3.5–5.2)
ALP BLD-CCNC: 46 U/L (ref 40–129)
ALT SERPL-CCNC: 33 U/L (ref 0–40)
ANION GAP SERPL CALCULATED.3IONS-SCNC: 14 MMOL/L (ref 7–16)
AST SERPL-CCNC: 22 U/L (ref 0–39)
BILIRUB SERPL-MCNC: 0.4 MG/DL (ref 0–1.2)
BUN BLDV-MCNC: 14 MG/DL (ref 8–23)
CALCIUM SERPL-MCNC: 10.5 MG/DL (ref 8.6–10.2)
CHLORIDE BLD-SCNC: 99 MMOL/L (ref 98–107)
CO2: 27 MMOL/L (ref 22–29)
CREAT SERPL-MCNC: 1.1 MG/DL (ref 0.7–1.2)
GFR AFRICAN AMERICAN: >60
GFR NON-AFRICAN AMERICAN: >60 ML/MIN/1.73
GLUCOSE BLD-MCNC: 168 MG/DL (ref 74–99)
HBA1C MFR BLD: 7.1 % (ref 4–5.6)
POTASSIUM SERPL-SCNC: 4.5 MMOL/L (ref 3.5–5)
SODIUM BLD-SCNC: 140 MMOL/L (ref 132–146)
TOTAL PROTEIN: 7.3 G/DL (ref 6.4–8.3)
TSH SERPL DL<=0.05 MIU/L-ACNC: 1.77 UIU/ML (ref 0.27–4.2)

## 2020-06-02 PROCEDURE — 84443 ASSAY THYROID STIM HORMONE: CPT

## 2020-06-02 PROCEDURE — 3017F COLORECTAL CA SCREEN DOC REV: CPT | Performed by: OTOLARYNGOLOGY

## 2020-06-02 PROCEDURE — 4004F PT TOBACCO SCREEN RCVD TLK: CPT | Performed by: OTOLARYNGOLOGY

## 2020-06-02 PROCEDURE — 80053 COMPREHEN METABOLIC PANEL: CPT

## 2020-06-02 PROCEDURE — G8427 DOCREV CUR MEDS BY ELIG CLIN: HCPCS | Performed by: OTOLARYNGOLOGY

## 2020-06-02 PROCEDURE — 36415 COLL VENOUS BLD VENIPUNCTURE: CPT

## 2020-06-02 PROCEDURE — 99213 OFFICE O/P EST LOW 20 MIN: CPT | Performed by: OTOLARYNGOLOGY

## 2020-06-02 PROCEDURE — G8417 CALC BMI ABV UP PARAM F/U: HCPCS | Performed by: OTOLARYNGOLOGY

## 2020-06-02 PROCEDURE — 83036 HEMOGLOBIN GLYCOSYLATED A1C: CPT

## 2020-06-02 ASSESSMENT — ENCOUNTER SYMPTOMS
RHINORRHEA: 1
EYE DISCHARGE: 1
COUGH: 0
EYE ITCHING: 1
SHORTNESS OF BREATH: 0
VOMITING: 0

## 2020-06-02 NOTE — PROGRESS NOTES
removed     TONSILLECTOMY      TRANSESOPHAGEAL ECHOCARDIOGRAM  12/8/2011         VASECTOMY  1998       Current Outpatient Medications:     metFORMIN (GLUCOPHAGE) 1000 MG tablet, Take 1 tablet by mouth 2 times daily (with meals), Disp: 180 tablet, Rfl: 0    famotidine (PEPCID) 20 MG tablet, Take 1 tablet by mouth 2 times daily, Disp: 180 tablet, Rfl: 3    furosemide (LASIX) 20 MG tablet, Take 1 tablet by mouth daily, Disp: 30 tablet, Rfl: 0    baclofen (LIORESAL) 20 MG tablet, Take 1 tablet by mouth 2 times daily, Disp: 60 tablet, Rfl: 5    spironolactone (ALDACTONE) 25 MG tablet, Take 1 tablet by mouth daily, Disp: 90 tablet, Rfl: 0    azelastine (ASTELIN) 0.1 % nasal spray, 1 spray by Nasal route 2 times daily Use in each nostril as directed, Disp: 1 Bottle, Rfl: 3    fluticasone (FLONASE) 50 MCG/ACT nasal spray, 1 spray by Each Nostril route daily, Disp: 1 Bottle, Rfl: 3    montelukast (SINGULAIR) 10 MG tablet, Take 1 tablet by mouth nightly, Disp: 30 tablet, Rfl: 3    atorvastatin (LIPITOR) 40 MG tablet, take 2 tablets by mouth every evening, Disp: 180 tablet, Rfl: 0    lisinopril (PRINIVIL;ZESTRIL) 10 MG tablet, take 1 tablet by mouth once daily, Disp: 90 tablet, Rfl: 1    digoxin (DIGOX) 250 MCG tablet, Take 1 tablet by mouth daily, Disp: 90 tablet, Rfl: 3    azelastine (ASTELIN) 0.1 % nasal spray, 2 sprays by Nasal route daily Use in each nostril as directed, Disp: 1 Bottle, Rfl: 3    fluticasone (FLONASE) 50 MCG/ACT nasal spray, 2 sprays by Nasal route daily 2 sprays each nostril once a day, Disp: 1 Bottle, Rfl: 3    albuterol sulfate HFA (PROVENTIL HFA) 108 (90 Base) MCG/ACT inhaler, Inhale 2 puffs into the lungs every 6 hours as needed for Wheezing, Disp: 1 Inhaler, Rfl: 3    aspirin 81 MG tablet, Take 1 tablet by mouth daily, Disp: 30 tablet, Rfl: 5    montelukast (SINGULAIR) 10 MG tablet, Take 1 tablet by mouth nightly, Disp: 30 tablet, Rfl: 5    carvedilol (COREG) 12.5 MG tablet, Take itching. Respiratory: Negative for cough and shortness of breath. Cardiovascular: Negative for chest pain and palpitations. Gastrointestinal: Negative for vomiting. Skin: Negative for rash. Allergic/Immunologic: Negative for environmental allergies. Neurological: Negative for dizziness and headaches. Hematological: Does not bruise/bleed easily. All other systems reviewed and are negative. Ht 6' 4\" (1.93 m)   Wt 268 lb (121.6 kg)   BMI 32.62 kg/m²   Physical Exam  Vitals signs and nursing note reviewed. Constitutional:       Appearance: He is well-developed. HENT:      Head: Normocephalic and atraumatic. Right Ear: Ear canal and external ear normal. Tympanic membrane is retracted. Left Ear: Tympanic membrane, ear canal and external ear normal.      Nose: Mucosal edema present. No nasal deformity or rhinorrhea. Right Sinus: No maxillary sinus tenderness or frontal sinus tenderness. Left Sinus: No maxillary sinus tenderness or frontal sinus tenderness. Eyes:      Pupils: Pupils are equal, round, and reactive to light. Neck:      Musculoskeletal: Normal range of motion. Thyroid: No thyromegaly. Trachea: No tracheal deviation. Cardiovascular:      Rate and Rhythm: Normal rate. Pulmonary:      Effort: Pulmonary effort is normal. No respiratory distress. Musculoskeletal: Normal range of motion. Lymphadenopathy:      Cervical: No cervical adenopathy. Skin:     General: Skin is warm. Findings: No erythema. Neurological:      Mental Status: He is alert. Cranial Nerves: No cranial nerve deficit.          IMPRESSION/PLAN:  Allergic rhinitis, seasonal  On Astelin and Flonase, Singulair- continue   Add over the counter antihistamine for flare ups  Follow up in 6 months  Seen, examined, discussed with Dr. Gonsal[facility]    Electronically signed by Juan Pablo Abrams DO on 6/2/2020 at 8:33 AM                Estefany Fall  1960      I have discussed the case, including pertinent history and exam findings with the resident. I have seen and examined the patient and the key elements of the encounter have been performed by me. I agree with the assessment, plan and orders as documented by the resident. Patient here for follow up of medical problems. Remainder of medical problems as per resident note.       [address] [facility], DO  6/11/20

## 2020-06-04 ENCOUNTER — OFFICE VISIT (OUTPATIENT)
Dept: INTERNAL MEDICINE | Age: 60
End: 2020-06-04
Payer: MEDICARE

## 2020-06-04 VITALS
HEART RATE: 84 BPM | HEIGHT: 76 IN | WEIGHT: 264 LBS | BODY MASS INDEX: 32.15 KG/M2 | TEMPERATURE: 98.4 F | OXYGEN SATURATION: 100 % | DIASTOLIC BLOOD PRESSURE: 79 MMHG | SYSTOLIC BLOOD PRESSURE: 122 MMHG | RESPIRATION RATE: 16 BRPM

## 2020-06-04 PROCEDURE — 99213 OFFICE O/P EST LOW 20 MIN: CPT | Performed by: INTERNAL MEDICINE

## 2020-06-04 PROCEDURE — 4004F PT TOBACCO SCREEN RCVD TLK: CPT | Performed by: INTERNAL MEDICINE

## 2020-06-04 PROCEDURE — 3051F HG A1C>EQUAL 7.0%<8.0%: CPT | Performed by: INTERNAL MEDICINE

## 2020-06-04 PROCEDURE — G8427 DOCREV CUR MEDS BY ELIG CLIN: HCPCS | Performed by: INTERNAL MEDICINE

## 2020-06-04 PROCEDURE — 2022F DILAT RTA XM EVC RTNOPTHY: CPT | Performed by: INTERNAL MEDICINE

## 2020-06-04 PROCEDURE — 3017F COLORECTAL CA SCREEN DOC REV: CPT | Performed by: INTERNAL MEDICINE

## 2020-06-04 PROCEDURE — G8417 CALC BMI ABV UP PARAM F/U: HCPCS | Performed by: INTERNAL MEDICINE

## 2020-06-04 RX ORDER — ZOSTER VACCINE RECOMBINANT, ADJUVANTED 50 MCG/0.5
0.5 KIT INTRAMUSCULAR SEE ADMIN INSTRUCTIONS
Qty: 0.5 ML | Refills: 0 | Status: SHIPPED | OUTPATIENT
Start: 2020-06-04 | End: 2020-08-17

## 2020-06-04 RX ORDER — SPIRONOLACTONE 25 MG/1
25 TABLET ORAL DAILY
Qty: 90 TABLET | Status: CANCELLED | OUTPATIENT
Start: 2020-06-04

## 2020-06-04 RX ORDER — ZOSTER VACCINE RECOMBINANT, ADJUVANTED 50 MCG/0.5
0.5 KIT INTRAMUSCULAR SEE ADMIN INSTRUCTIONS
Qty: 0.5 ML | Refills: 0 | Status: SHIPPED
Start: 2020-06-04 | End: 2020-06-04

## 2020-06-04 RX ORDER — FUROSEMIDE 20 MG/1
20 TABLET ORAL DAILY
Qty: 30 TABLET | Status: CANCELLED | OUTPATIENT
Start: 2020-06-04

## 2020-06-04 RX ORDER — MONTELUKAST SODIUM 10 MG/1
10 TABLET ORAL NIGHTLY
Qty: 30 TABLET | Status: CANCELLED | OUTPATIENT
Start: 2020-06-04

## 2020-06-04 RX ORDER — BACLOFEN 20 MG/1
20 TABLET ORAL 2 TIMES DAILY
Qty: 60 TABLET | Status: CANCELLED | OUTPATIENT
Start: 2020-06-04

## 2020-06-04 RX ORDER — FLUTICASONE PROPIONATE 50 MCG
1 SPRAY, SUSPENSION (ML) NASAL DAILY
Qty: 1 BOTTLE | Status: CANCELLED | OUTPATIENT
Start: 2020-06-04

## 2020-06-04 RX ORDER — LISINOPRIL 10 MG/1
TABLET ORAL
Qty: 90 TABLET | Status: CANCELLED | OUTPATIENT
Start: 2020-06-04

## 2020-06-04 RX ORDER — ATORVASTATIN CALCIUM 40 MG/1
TABLET, FILM COATED ORAL
Qty: 180 TABLET | Status: CANCELLED | OUTPATIENT
Start: 2020-06-04

## 2020-06-04 RX ORDER — FAMOTIDINE 20 MG/1
20 TABLET, FILM COATED ORAL 2 TIMES DAILY
Qty: 180 TABLET | Refills: 1 | Status: SHIPPED
Start: 2020-06-04 | End: 2021-03-29 | Stop reason: SDUPTHER

## 2020-06-04 RX ORDER — AZELASTINE 1 MG/ML
1 SPRAY, METERED NASAL 2 TIMES DAILY
Qty: 1 BOTTLE | Status: CANCELLED | OUTPATIENT
Start: 2020-06-04

## 2020-06-04 ASSESSMENT — ENCOUNTER SYMPTOMS
ABDOMINAL PAIN: 0
SHORTNESS OF BREATH: 0
EYE ITCHING: 0
VOMITING: 0
NAUSEA: 0
EYE DISCHARGE: 0
DIARRHEA: 0
COUGH: 0
BLOOD IN STOOL: 0
WHEEZING: 0
TROUBLE SWALLOWING: 0
CONSTIPATION: 0
RHINORRHEA: 0
CHEST TIGHTNESS: 0

## 2020-06-04 NOTE — PATIENT INSTRUCTIONS
- Please check bood sugar before breakfast/coffee   - Please follow up with Podiatrist, and Eye-doctor. - Follow up with Dr. Panchito Hernandez for the need of anticoagulation.

## 2020-06-05 ENCOUNTER — TELEPHONE (OUTPATIENT)
Dept: NON INVASIVE DIAGNOSTICS | Age: 60
End: 2020-06-05

## 2020-06-05 NOTE — TELEPHONE ENCOUNTER
Prasanth Perdomo wife called and said that they saw his family Dr yesterday. The [facility] thinks he might be in afib. Hafsa Reynolds sent a remote on 6/4/2020.

## 2020-06-05 NOTE — TELEPHONE ENCOUNTER
Reviewed carelink:   Real time AP/VS @ 88  2 AT/AF longest 45 seconds. On Tomasa 3 ,2020. Spoke with Leydi  ( patient's wife) regarding carelink results. Leydi Hertel voiced understanding.        Joesph Neri

## 2020-06-19 RX ORDER — ATORVASTATIN CALCIUM 40 MG/1
TABLET, FILM COATED ORAL
Qty: 120 TABLET | Refills: 0 | OUTPATIENT
Start: 2020-06-19

## 2020-06-29 ENCOUNTER — NURSE ONLY (OUTPATIENT)
Dept: NON INVASIVE DIAGNOSTICS | Age: 60
End: 2020-06-29
Payer: MEDICARE

## 2020-06-29 PROCEDURE — 93296 REM INTERROG EVL PM/IDS: CPT | Performed by: INTERNAL MEDICINE

## 2020-06-29 PROCEDURE — 93294 REM INTERROG EVL PM/LDLS PM: CPT | Performed by: INTERNAL MEDICINE

## 2020-07-07 RX ORDER — SPIRONOLACTONE 25 MG/1
25 TABLET ORAL DAILY
Qty: 90 TABLET | Refills: 0 | Status: SHIPPED
Start: 2020-07-07 | End: 2020-10-02 | Stop reason: SDUPTHER

## 2020-07-07 NOTE — TELEPHONE ENCOUNTER
Last Appointment:  6-4-20  Future Appointments   Date Time Provider Pratik Mabryi   8/17/2020  8:40 AM Nasim Briceno MD [facility] NEURO [facility]   9/3/2020  9:30 AM Hetal Swartz MD [facility]   9/30/2020  9:00 AM SCHEDULE, REMOTE CHECK [facility] ELECTRO PHYS [facility]   12/9/2020  8:45 AM DO Nathaly Strickland ENT [facility]

## 2020-07-08 RX ORDER — SPIRONOLACTONE 25 MG/1
25 TABLET ORAL DAILY
Qty: 90 TABLET | Refills: 0 | OUTPATIENT
Start: 2020-07-08

## 2020-08-17 ENCOUNTER — VIRTUAL VISIT (OUTPATIENT)
Dept: NEUROLOGY | Age: 60
End: 2020-08-17
Payer: MEDICARE

## 2020-08-17 PROCEDURE — 99215 OFFICE O/P EST HI 40 MIN: CPT | Performed by: PSYCHIATRY & NEUROLOGY

## 2020-08-17 NOTE — PROGRESS NOTES
This 65-year-old right-handed man was referred for evaluation management of longstanding headaches. He was deemed a good historian, as well as his wife. This visit was conducted by telemedicine. His medications were now baclofen, spironolactone, Flonase, Singulair, Lipitor, metformin, famotidine, lisinopril, digoxin, topiramate, inhalers, aspirin, furosemide and Coreg. 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 His ejection fraction was now approximately 50%. He still suffered from intermittent atrial fibrillation, but only briefly and rarely. He reported non-insulin-dependent diabetes mellitus, hypertension, hyperlipidemia, gastroesophageal reflux disease and chronic obstructive lung disease. Previously, his physicians had questioned embolic strokes from his viral cardiomyopathy. However, MRIs were never obtained to confirm this diagnosis. Past CT scans of his head were unremarkable. I previously saw this individual 7 years ago, when I suspected tension type headaches. He was prescribed baclofen, but never took that medication. I also questioned sleep apnea contributing to his headaches. The patient returned for evaluation of these headaches. In the interim, he had seen another local neurologist who suspected migraines. He was placed on low-dose topiramate, without effect. He still noted bilateral, daily, pressure sensations. He denied inciting or relieving events. He also noted very brief, lightninglike sensations occurring on both sides of his head, as well as in his left periorbital region. He reported no other visual loss, bright lights, spinning sensations, speech difficulties, other pains or loss of consciousness. He was diagnosed with tension type headaches were confirmed on his last visit. He was now on baclofen 20 mg in evening and 10 mg at night. He was unable to tolerate higher doses.   With his current dosing, he noted 75% reduction in his headaches. However, he still slept erratically and snore throughout the night. 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 He barely saw moving objects in the left eye. His right eye was intact. He denied additional events. He denied other neurological events. He was stable medically, despite his comorbidities. He was eating well. He was practicing proper social distancing and mask wear during the pandemic. Review of systems was otherwise unremarkable. He was afebrile and breathing comfortably. He was an elderly gentleman in no acute distress, who was alert, cooperative and oriented. He remained pleasant. His skin was unremarkable. Head examination revealed a small posterior pharyngeal aperture. He denied any chest pains, palpitations or shortness of breath. His limbs displayed no abnormalities. Neurological examination produced an intact mental status. Cranial nerve testing revealed no abnormalities. He moves all his limbs well. He appreciated light touch in all limbs. Coordination testing was unrevealing. He walked well. Laboratory data included an unremarkable CMP, except for a GFR of 56. Hemoglobin A1c was 7.1. A previous B12 level was 222. CBC with differential was unremarkable in the past.  Hepatitis panel 4 years ago was unrevealing. A CT scan of his head last year was normal.    This individual's neurological examination is unrevealing. His headaches are primarily tension type spells, likely aggravated by obstructive sleep apnea. He will continue with his current dose of baclofen. Hopefully, sleep studies can be performed soon. 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 Fortunately, there has been no return. He is stable medically despite his multiple comorbidities.   Fortunately, his viral cardiomyopathy has not worsened. However, he is plagued with brief, intermittent atrial fibrillation. He will see his electrophysiologist soon. He will return in 5 months. Baclofen was maintained at 20 mg at night and 10 mg in the morning. Sleep studies will, hopefully, be scheduled soon. He report back after the studies were completed. He will call at any time if problems arise. I spent 40 minutes with the patient with over 50 % spent in counseling and disease management. All patient issues were addressed and all questions were answered. TeleMedicine Patient Consent    This visit was performed as a virtual video visit using a synchronous, two-way, audio-video telehealth technology platform. Patient identification was verified at the start of the visit, including the patient's telephone number and physical location. I discussed with the patient the nature of our telehealth visits, that:     1. Due to the nature of an audio- video modality, the only components of a physical exam that could be done are the elements supported by direct observation. 2. I would evaluate the patient and recommend diagnostics and treatments based on my assessment. 3. If it was felt that the patient should be evaluated in clinic or an emergency room setting, then they would be directed there. 4. Our sessions are not being recorded and that personal health information is protected. 5. Our team would provide follow up care in person if/when the patient needs it. The patient did agree to proceed with telemedicine consultation. This visit was conducted at the patient's home. Again I spent 40 minutes with the patient. This visit was completed virtually using doxy. me.

## 2020-08-18 ENCOUNTER — TELEPHONE (OUTPATIENT)
Dept: NON INVASIVE DIAGNOSTICS | Age: 60
End: 2020-08-18

## 2020-08-18 NOTE — TELEPHONE ENCOUNTER
We have received your remote transmission. Our staff will contact you if there is anything that needs to be discussed. Your next appointment is 9/30/2020 remote transmission from home. Spoke to patient wife. Verbalized understanding of next transmission date.

## 2020-08-18 NOTE — TELEPHONE ENCOUNTER
----- Message from Maria E Mcknight RN sent at 6/30/2020  8:37 AM EDT -----  Successful transmission received. Please call patient and give next appointment.

## 2020-08-20 ENCOUNTER — TELEPHONE (OUTPATIENT)
Dept: NEUROLOGY | Age: 60
End: 2020-08-20

## 2020-08-24 NOTE — TELEPHONE ENCOUNTER
Last Appointment:  6-4-20  Future Appointments   Date Time Provider Pratik Garcia   9/3/2020  9:30 AM Evin Chang MD [facility]   9/30/2020  9:00 AM SCHEDULE, REMOTE CHECK FITO ELECTRO PHYS [facility]   12[facility]2020  [facility]   12/15/2020  9:20 AM Kiesha Ge MD [address]

## 2020-08-27 ENCOUNTER — TELEPHONE (OUTPATIENT)
Dept: NEUROLOGY | Age: 60
End: 2020-08-27

## 2020-08-27 NOTE — TELEPHONE ENCOUNTER
Sleep lab called- Josh Wheatley- requesting Home sleep study.  In lab sleep study have a waiting list.

## 2020-09-02 ENCOUNTER — TELEPHONE (OUTPATIENT)
Dept: INTERNAL MEDICINE | Age: 60
End: 2020-09-02

## 2020-09-02 RX ORDER — LISINOPRIL 10 MG/1
10 TABLET ORAL DAILY
Qty: 90 TABLET | Refills: 0 | Status: SHIPPED
Start: 2020-09-02 | End: 2020-12-09 | Stop reason: SDUPTHER

## 2020-09-02 NOTE — TELEPHONE ENCOUNTER
[facility]    Pre-visit planning call to discuss patient care during COVID-19 pandemic. A. Discussed with the patient / wife  about virtual visits through [employer] or [employer] link to facilitate patient care continuity. Last office visit date: 6/20/2020      B.  Result of call: Patient agrees to virtual visit     C. COVID-19 screening: Virtual visit, Sumanth Singh MD  9/2/20

## 2020-09-03 ENCOUNTER — VIRTUAL VISIT (OUTPATIENT)
Dept: INTERNAL MEDICINE | Age: 60
End: 2020-09-03
Payer: MEDICARE

## 2020-09-03 ENCOUNTER — TELEPHONE (OUTPATIENT)
Dept: CARDIOLOGY CLINIC | Age: 60
End: 2020-09-03

## 2020-09-03 PROCEDURE — 99202 OFFICE O/P NEW SF 15 MIN: CPT | Performed by: INTERNAL MEDICINE

## 2020-09-03 ASSESSMENT — ENCOUNTER SYMPTOMS: RESPIRATORY NEGATIVE: 1

## 2020-09-03 NOTE — PROGRESS NOTES
TeleMedicine Video Visit    This visit was performed as a virtual  visit using a synchronous, two-way, audio-video telehealth technology platform. Patient identification was verified at the start of the visit, including the patient's telephone number and physical location. I discussed with the patient the nature of our telehealth visits, that:     1. Due to the nature of an video modality, the only components of a physical exam that could be done are the elements supported by direct observation. 2. I would evaluate the patient and recommend diagnostics and treatments based on my assessment. 3. If it was felt that the patient should be evaluated in clinic or an emergency room setting, then they would be directed there. 4. Our sessions are not being recorded and that personal health information is protected. 5. Our team would provide follow up care in person if/when the patient needs it. Patient  agree to proceed with telemedicine consultation. Pt with remote TIA and headache, sleep study was recommended. Pt reported persistent headache this time, ICD placement for non ischemic cardiomyopathy, also reported chest pain once or twice weekly and ST was recommended in this visit. Recent ST was 2017. DM with recent A1C 7.1 and has been on metfomin but BG has been increased. May add gliptin.     Jose Serrano

## 2020-09-03 NOTE — PROGRESS NOTES
9/3/2020    TELEHEALTH EVALUATION -- Audio/Visual (During STQLW-80 public [facility] emergency)    HPI:    Naheed Alfred (:  1960) has requested an audio/video evaluation for the following concern(s):    Patient had VV with  Neurology on 2020. Requested sleep study not yet done. During the visit neurology patient's thought that his headaches is getting better but he states that last week headaches have been terrible. Patient also complained of chest pain, states that he has been having this chronic chest pain since 6 to 7 years ago but during this past week has been more worse than before. When asked why he did not went to the ED he states that it was not that bad for him to go. He does note that if it recurs again he needs to go to the ED. He has not yet set up an appointment with his new cardiologist, and has not scheduled annual appointment with Dr. Regi Lorenzo his EP. He did labs on 2020 which showed crea back to 1.1, calcium elevated at 10.4 . BP at home is 120/90. Review of Systems   Constitutional: Negative for activity change, appetite change, chills, diaphoresis, fatigue and fever. HENT: Negative. Respiratory: Negative. Cardiovascular: Positive for chest pain. Endocrine: Negative. Genitourinary: Negative. Musculoskeletal: Negative. Neurological: Positive for headaches. Prior to Visit Medications    Medication Sig Taking?  Authorizing Provider   lisinopril (PRINIVIL;ZESTRIL) 10 MG tablet Take 1 tablet by mouth daily  Dewitt Mortimer, MD   metFORMIN (GLUCOPHAGE) 1000 MG tablet TAKE ONE TABLET BY MOUTH TWICE A DAY,WITH MEALS  Elizabet Murphy MD   spironolactone (ALDACTONE) 25 MG tablet Take 1 tablet by mouth daily  Mike Willingham DO   atorvastatin (LIPITOR) 80 MG tablet take 1 tablet by mouth every evening  Mike Willingham DO   famotidine (PEPCID) 20 MG tablet Take 1 tablet by mouth 2 times daily  Karl Claudio MD   furosemide (LASIX) 20 MG tablet Take 1 tablet by mouth daily  Kristin Lamar MD   baclofen (LIORESAL) 20 MG tablet Take 1 tablet by mouth 2 times daily  Ced Baeza MD   azelastine (ASTELIN) 0.1 % nasal spray 1 spray by Nasal route 2 times daily Use in each nostril as directed  Yana Keen DO   digoxin ([address] [place]) 250 MCG tablet Take 1 tablet by mouth daily  Hubert Figueroa MD   SUMAtriptan (IMITREX) 25 MG tablet take 2 tablets by mouth if needed for migraines  Patient not taking: Reported on 8/17/2020  Michael Luis DO   azelastine (ASTELIN) 0.1 % nasal spray 2 sprays by Nasal route daily Use in each nostril as directed  Damon Garcia MD   fluticasone (FLONASE) 50 MCG/ACT nasal spray 2 sprays by Nasal route daily 2 sprays each nostril once a day  Damon Garcia MD   albuterol (PROVENTIL) (2.5 MG/3ML) 0.083% nebulizer solution Take 3 mLs by nebulization every 6 hours as needed for Wheezing  Sravanthi Freitas MD   albuterol sulfate HFA (PROVENTIL HFA) 108 (90 Base) MCG/ACT inhaler Inhale 2 puffs into the lungs every 6 hours as needed for Traceeeleanor Angeles MD   aspirin 81 MG tablet Take 1 tablet by mouth daily  Sravanthi Dumas MD   montelukast (SINGULAIR) 10 MG tablet Take 1 tablet by mouth nightly  Damon Garcia MD   carvedilol (COREG) 12.5 MG tablet Take 1 tablet by mouth 2 times daily (with meals)  Adrian Luna MD   Misc. Devices MISC 1 each by Does not apply route once as needed for up to 1 dose. Nebulizer and tubing. Roxanne Simons DO       Social History     Tobacco Use    Smoking status: Current Every Day Smoker     Packs/day: 0.50     Years: 36.00     Pack years: 18.00     Types: Cigarettes    Smokeless tobacco: Never Used    Tobacco comment: currently . 025 ppd   Substance Use Topics    Alcohol use:  Yes     Alcohol/week: 0.0 standard drinks     Frequency: 2-4 times a month     Drinks per session: 1 or 2     Binge frequency: Never     Comment: rarely    Drug use: Not Currently set up with new cardiologist Dr. Barrera Louise     Paroxysmal atrial flutter  -S/P AV kamar ablation 12/2013  -[id number] Score: 4  -on Coreg 12.5mg BID and Digoxin 250mcg QD   -[address] held because by EP after successful ablation  - follows Dr Kandi FELDER lasy visit 7/31/19 no arrythmias noted, not on OAC        TYPE 2 DM, with microalbuminuria   -Last Hemoglobin A1C 6.7>> 6.4 8/1/19 >>  5/1/2020 6.9 >> 7.1  6/2020  - microalbumin 6.1, creatinine 1.1  - on Metformin 1000mg BID  -Checks blood sugar at least 1 times a day, blood sugar average 150s with latest hemoglobin A1c 7.1  >> Start linagliptin 5 mg daily    CKD, stage 3A, with micro albuminuria likely from DM nephropathy  Crea 1.3 , GFR 56   - creatinine 6/2020: 1.1      Seasonal allergies, stable   - PFT 3/27/2015 : FEV1 87% predicted , + post bronchodilator response 14% ? FEV1 volume change. DLCO normal   -PRN Singulair Albuterol PRN  - follows ENT     Hx of TIA/ CVA  -With persistent left sided vision loss and retinal infarct  -On ASA and Statin     L eye vision loss, able to do only light perception  ? Retinal infarct, ?  CVA     Intractable migraine without aura and without status migrainosus / Chronic headache \  - sharp headaches 10/10 lasting fro about 30 seconds   -   topiramate 100 MG tablet daily --off   >> seen by neurology states it maybe tension headache , started on baclofen 20 mg   >> neurology ordered  sleep study      Gastroesophageal reflux disease, esophagitis presence not specified  -    on famotidine 20 MG tablet; BID      OA  -follows orthopedics  - knee brace ordered per ortho note      Smoking  -Smokes 1PPD since 27+ years; currently 1/2PPD   -Pre contemplative at this time        Health care maintenance   -HIV screen and Hep C negative   -Diabetic foot exam 2020, + calluses, refuses podiatry consult   -Flu shot up to date  -Shingles script provided 2/26/2020   -JCSV 9922   -Diabetic eye exam  -colonoscopy in 2016  -Urine microalbuminuria  6.1 5/1/2020     RTC: 4 months needs to be face-to-face    Chhaya Guy is a 63 y.o. male being evaluated by a Virtual Visit (video visit) encounter to address concerns as mentioned above. A caregiver was present when appropriate. Due to this being a TeleHealth encounter (During DUJGN-16 public health emergency), evaluation of the following organ systems was limited: Vitals/Constitutional/EENT/Resp/CV/GI//MS/Neuro/Skin/Heme-Lymph-Imm. Pursuant to the emergency declaration under the [address] [place] [address], [address] [place] [address] authority and the [employer] Act, this Virtual Visit was conducted with patient's (and/or legal guardian's) consent, to reduce the patient's risk of exposure to COVID-19 and provide necessary medical care. The patient (and/or legal guardian) has also been advised to contact this office for worsening conditions or problems, and seek emergency medical treatment and/or call 911 if deemed necessary. Patient identification was verified at the start of the visit: Yes    Total time spent on this encounter: 30    Services were provided through a video synchronous discussion virtually to substitute for in-person clinic visit. Patient and provider were located at their individual homes. --Sosa Lloyd MD on 9/3/2020 at 7:05 AM    An electronic signature was used to authenticate this note.

## 2020-09-03 NOTE — TELEPHONE ENCOUNTER
Wife called to make appt. Appt 9/17/20. Dali Breen had one episode of pain in chest that took him down to his knees-felt like a \"presence. \"  Dizzy-had to lie down-felt disoriented. They sent a strip to EP (have not heard anything from that). Advised if he has this again to go to ER or call office.

## 2020-09-03 NOTE — PATIENT INSTRUCTIONS
New medication:   - Start linagliptin 5 mg tablet daily     Monitor blood sugar atleast once daily    Follow up schedule with Cardiologist  Do stress test

## 2020-09-09 NOTE — TELEPHONE ENCOUNTER
Remote tx from 9-2-2020 reviewed: no episodes correlate with date of symptoms. 1 NSVT = 8 beats on 8-. (see media)   Spoke with wife, reviewed results, no symptoms on 8-. Has OV with Dr. Panfilo Davison on 9-. Also reviewed next remote tx date with wife. She verbalized understanding. Will continue to monitor.      Bernadine Franklin RN, BSN  [facility]

## 2020-09-14 RX ORDER — FUROSEMIDE 20 MG/1
20 TABLET ORAL DAILY
Qty: 90 TABLET | Refills: 0 | Status: SHIPPED
Start: 2020-09-14 | End: 2020-12-13 | Stop reason: SDUPTHER

## 2020-09-17 ENCOUNTER — OFFICE VISIT (OUTPATIENT)
Dept: CARDIOLOGY CLINIC | Age: 60
End: 2020-09-17
Payer: MEDICARE

## 2020-09-17 VITALS
SYSTOLIC BLOOD PRESSURE: 114 MMHG | HEART RATE: 75 BPM | RESPIRATION RATE: 16 BRPM | DIASTOLIC BLOOD PRESSURE: 76 MMHG | HEIGHT: 76 IN | BODY MASS INDEX: 32.88 KG/M2 | WEIGHT: 270 LBS | OXYGEN SATURATION: 97 %

## 2020-09-17 PROCEDURE — 4004F PT TOBACCO SCREEN RCVD TLK: CPT | Performed by: INTERNAL MEDICINE

## 2020-09-17 PROCEDURE — G8417 CALC BMI ABV UP PARAM F/U: HCPCS | Performed by: INTERNAL MEDICINE

## 2020-09-17 PROCEDURE — 3017F COLORECTAL CA SCREEN DOC REV: CPT | Performed by: INTERNAL MEDICINE

## 2020-09-17 PROCEDURE — G8427 DOCREV CUR MEDS BY ELIG CLIN: HCPCS | Performed by: INTERNAL MEDICINE

## 2020-09-17 PROCEDURE — 93000 ELECTROCARDIOGRAM COMPLETE: CPT | Performed by: INTERNAL MEDICINE

## 2020-09-17 PROCEDURE — 99214 OFFICE O/P EST MOD 30 MIN: CPT | Performed by: INTERNAL MEDICINE

## 2020-09-17 RX ORDER — CARVEDILOL 12.5 MG/1
12.5 TABLET ORAL 2 TIMES DAILY WITH MEALS
Qty: 180 TABLET | Refills: 3 | Status: SHIPPED
Start: 2020-09-17 | End: 2021-07-01 | Stop reason: SDUPTHER

## 2020-09-17 ASSESSMENT — ENCOUNTER SYMPTOMS
BLOOD IN STOOL: 0
WHEEZING: 0
SHORTNESS OF BREATH: 0
COUGH: 1
ABDOMINAL PAIN: 0
BACK PAIN: 0
CONSTIPATION: 0
VOMITING: 0
DIARRHEA: 0
NAUSEA: 1

## 2020-09-17 NOTE — PROGRESS NOTES
OUTPATIENT CARDIOLOGY FOLLOW-UP    HPI:    Name: Teresa Figueroa    Age: 63 y.o. Primary Care Physician: Efren Clark MD    Date of Service: 9/17/2020    Chief Complaint:   Chief Complaint   Patient presents with    Cardiomyopathy     16 month ov. (Former [facility] pt). Echo done 6/18/19. Follows with Dr Daved Homans; no discharges. Labs 5/20 & 6/20.  Atrial Flutter    Hypertension        History of present illness : 59-year-old morbidly obese male who comes today for a follow-up visit. He was seen by Dr. Mercedes Lara on 5/30/2019. He has history of nonischemic cardiomyopathy, paroxysmal atrial flutter, left atrial appendage clot, status post ablation by Dr. Amna Cespedes, status post AICD which was exchanged to a pacemaker in April 2017, status post CVA, diabetes, hypertension, hyperlipidemia, and history of ex-alcohol abuse. Patient continues to smoke. He is fairly active. He has been complaining of occasional chest discomfort over the past 6 to 7 years. His discomfort is described as fist in his chest lasting few seconds up to half minute. It is rated as 8/10 intensity and does not radiate to his neck arms or back. There are no associated symptoms. Patient has been feeling occasional palpitations with no dizziness or loss of consciousness. He denies orthopnea, PND or lower extremity edema. EKG done today revealed probable ectopic atrial rhythm at 75 bpm, low voltage in frontal leads, incomplete right bundle branch block and probable left ventricular hypertrophy. Review of Systems:   Review of Systems   Constitutional: Negative for chills, fatigue and fever. HENT: Negative for nosebleeds. Respiratory: Positive for cough. Negative for shortness of breath and wheezing. He snores and gasp for air at night. Gastrointestinal: Positive for nausea. Negative for abdominal pain, blood in stool, constipation, diarrhea and vomiting. Genitourinary: Negative for dysuria and hematuria. Musculoskeletal: Negative for back pain, joint swelling and myalgias. Aching. Neurological: Negative for syncope and light-headedness. Psychiatric/Behavioral: The patient is not nervous/anxious. Past Medical History:  Past Medical History:   Diagnosis Date    Arthritis     [facility] ([facility]     Bruising tendency (HCC)     Cardiomyopathy ([facility]     probably viral, LV systolic dysfunction EF 14% July 2011    Cerebral artery occlusion with cerebral infarction [facility])     TIA    CHF (congestive heart failure) ([facility]     ef 20    Diabetes mellitus ([facility]     Family history of colon cancer 7/21/2014    Headache(784.0)     Heart trouble July, 2011, February, 2012    Hospitalized (WHAT TYPE?)    History of blood transfusion     History of frequent ear infections     History of substance abuse ([facility] 10/6/2011    A. Abstinence since 1980's B. H/O significant alcohol consumption     Hx of blood clots     HEART    Hyperlipidemia     Hypertension     Memory loss     Recurrent infections 1985, 1995, 2005    ASK WHERE?     Speech problem     Vertebrobasilar TIAs 10/15/2015    2012        Past Surgical History:  Past Surgical History:   Procedure Laterality Date    A-V CARDIAC PACEMAKER INSERTION Left 04/28/2017    Dual ICD removed Dual chamber Medtronic Pacer insertion by Dr Jean Espinoza  12/13/2013    aflutter ablation    CARDIAC DEFIBRILLATOR PLACEMENT  10/27/2011    Dr. Amna Cespedes, dual chamber  Medtronic    CARDIOVERSION  11-    Cardioversion   Dr. Arsenio Stout    right    SINUS SURGERY      polyps removed     TONSILLECTOMY      TRANSESOPHAGEAL ECHOCARDIOGRAM  12/8/2011         VASECTOMY  1998       Family History:  Family History   Problem Relation Age of Onset    Cancer Mother         colon, mets to liver and lungs    No Known Problems Sister     High Blood Pressure Brother     High Cholesterol Brother Medication Sig Dispense Refill    furosemide (LASIX) 20 MG tablet Take 1 tablet by mouth daily 90 tablet 0    linagliptin (TRADJENTA) 5 MG tablet Take 1 tablet by mouth daily 30 tablet 3    lisinopril (PRINIVIL;ZESTRIL) 10 MG tablet Take 1 tablet by mouth daily 90 tablet 0    metFORMIN (GLUCOPHAGE) 1000 MG tablet TAKE ONE TABLET BY MOUTH TWICE A DAY,WITH MEALS 180 tablet 0    spironolactone (ALDACTONE) 25 MG tablet Take 1 tablet by mouth daily 90 tablet 0    atorvastatin (LIPITOR) 80 MG tablet take 1 tablet by mouth every evening 90 tablet 0    famotidine (PEPCID) 20 MG tablet Take 1 tablet by mouth 2 times daily 180 tablet 1    baclofen (LIORESAL) 20 MG tablet Take 1 tablet by mouth 2 times daily 60 tablet 5    azelastine (ASTELIN) 0.1 % nasal spray 1 spray by Nasal route 2 times daily Use in each nostril as directed 1 Bottle 3    digoxin (DIGOX) 250 MCG tablet Take 1 tablet by mouth daily 90 tablet 3    SUMAtriptan (IMITREX) 25 MG tablet take 2 tablets by mouth if needed for migraines (Patient not taking: Reported on 8/17/2020) 10 tablet 0    azelastine (ASTELIN) 0.1 % nasal spray 2 sprays by Nasal route daily Use in each nostril as directed 1 Bottle 3    fluticasone (FLONASE) 50 MCG/ACT nasal spray 2 sprays by Nasal route daily 2 sprays each nostril once a day 1 Bottle 3    albuterol (PROVENTIL) (2.5 MG/3ML) 0.083% nebulizer solution Take 3 mLs by nebulization every 6 hours as needed for Wheezing 120 each 5    albuterol sulfate HFA (PROVENTIL HFA) 108 (90 Base) MCG/ACT inhaler Inhale 2 puffs into the lungs every 6 hours as needed for Wheezing 1 Inhaler 3    aspirin 81 MG tablet Take 1 tablet by mouth daily 30 tablet 5    montelukast (SINGULAIR) 10 MG tablet Take 1 tablet by mouth nightly 30 tablet 5    carvedilol (COREG) 12.5 MG tablet Take 1 tablet by mouth 2 times daily (with meals) 180 tablet 3    Misc. Devices MISC 1 each by Does not apply route once as needed for up to 1 dose.  Nebulizer and tubing. 1 Device 0     No current facility-administered medications for this visit. Physical Exam:  Ht 6' 4\" (1.93 m)   BMI 32.14 kg/m²   Wt Readings from Last 3 Encounters:   06/04/20 264 lb (119.7 kg)   06/02/20 268 lb (121.6 kg)   04/14/20 260 lb (117.9 kg)       Physical Exam  Constitutional:       General: He is not in acute distress. Appearance: He is well-developed. He is obese. HENT:      Head: Normocephalic and atraumatic. Neck:      Musculoskeletal: Neck supple. Vascular: No carotid bruit or JVD. Cardiovascular:      Rate and Rhythm: Normal rate and regular rhythm. Pulses: Normal pulses. Heart sounds: No murmur. No friction rub. No gallop. Pulmonary:      Breath sounds: Normal breath sounds. No wheezing or rales. Chest:      Chest wall: No tenderness. Abdominal:      General: Bowel sounds are normal. There is no distension. Palpations: Abdomen is soft. There is no mass. Tenderness: There is no abdominal tenderness. Comments: No abdominal bruit. Musculoskeletal:      Right lower leg: No edema. Left lower leg: No edema. Comments: Varicose veins noted. Skin:     General: Skin is warm and dry. Neurological:      Mental Status: He is alert and oriented to person, place, and time.            Laboratory Tests:  Lab Results   Component Value Date    CREATININE 1.1 06/02/2020    BUN 14 06/02/2020     06/02/2020    K 4.5 06/02/2020    CL 99 06/02/2020    CO2 27 06/02/2020     Lab Results   Component Value Date    MG 2.0 03/03/2012     Lab Results   Component Value Date    WBC 10.7 05/01/2020    HGB 14.9 05/01/2020    HCT 43.6 05/01/2020    MCV 96.2 05/01/2020     05/01/2020     Lab Results   Component Value Date    ALT 33 06/02/2020    AST 22 06/02/2020    ALKPHOS 46 06/02/2020    BILITOT 0.4 06/02/2020     Lab Results   Component Value Date    CKTOTAL 993 (H) 02/19/2015    CKMB 5.2 02/18/2015    TROPONINI <0.01 02/18/2015 TROPONINI <0.01 02/18/2015    TROPONINI <0.01 02/17/2015     Lab Results   Component Value Date    INR 1.2 02/17/2015    INR 2.3 04/28/2014    INR 2.9 04/14/2014    PROTIME 12.1 (H) 02/17/2015    PROTIME 27.1 04/28/2014    PROTIME 35.0 04/14/2014     Lab Results   Component Value Date    TSH 1.770 06/02/2020     Lab Results   Component Value Date    LABA1C 7.1 (H) 06/02/2020     No results found for: EAG  Lab Results   Component Value Date    CHOL 142 05/01/2020    CHOL 140 07/31/2019    CHOL 153 12/19/2018     Lab Results   Component Value Date    TRIG 108 05/01/2020    TRIG 105 07/31/2019    TRIG 116 12/19/2018     Lab Results   Component Value Date    HDL 39 05/01/2020    HDL 34 07/31/2019    HDL 35 12/19/2018     Lab Results   Component Value Date    LDLCALC 81 05/01/2020    LDLCALC 85 07/31/2019    [address] 12/19/2018     Lab Results   Component Value Date    LABVLDL 22 05/01/2020    LABVLDL 21 07/31/2019    LABVLDL 23 12/19/2018       Cardiac Tests:    Echocardiogram: Done on 7/17/2019 revealed mild tricuspid regurgitation, grade 1 diastolic dysfunction with an ejection fraction of 65%. Mary Pall done on 1/11/2017 was reported as normal perfusion with ejection fraction of 50%. ASSESSMENT / PLAN:  -Chest discomfort: Probably atypical in nature. However due to patient multiple risk factors including smoking, diabetes, hypertension and hyperlipidemia, would need to rule out myocardial ischemia.  -Nonischemic cardiomyopathy: Was probably alcohol induced or tachycardia induced. His ejection fraction is back to normal.  -Paroxysmal atrial flutter, status post ablation. He is in probable ectopic atrial rhythm. He has not been on anticoagulation.  -Hypertension: Controlled. -Hyperlipidemia: On high-dose Lipitor.  -Diabetes. -Status post AICD. -Status post CVA. -Morbid obesity with probable obstructive sleep apnea. Scheduled to undergo home sleep study. I will refill his Coreg.   We will continue his other cardiac medications. Will arrange for the patient to have a Lexiscan to rule out significant myocardial ischemia. Patient was advised to quit smoking and to avoid excess alcohol. Patient was advised to lose weight. We will follow-up at the office in 1 year. The patient's current medication list, allergies, problem list and results of all previously ordered testing were reviewed at today's visit. {  Alice Sánchez MD , [facility].   [facility]) Cardiology

## 2020-09-24 ENCOUNTER — TELEPHONE (OUTPATIENT)
Dept: CARDIOLOGY | Age: 60
End: 2020-09-24

## 2020-09-28 ENCOUNTER — HOSPITAL ENCOUNTER (OUTPATIENT)
Dept: CARDIOLOGY | Age: 60
Discharge: HOME | End: 2020-09-28
Payer: MEDICARE

## 2020-09-28 VITALS
RESPIRATION RATE: 18 BRPM | BODY MASS INDEX: 32.15 KG/M2 | WEIGHT: 264 LBS | SYSTOLIC BLOOD PRESSURE: 106 MMHG | HEIGHT: 76 IN | DIASTOLIC BLOOD PRESSURE: 78 MMHG | HEART RATE: 85 BPM

## 2020-09-28 PROCEDURE — 93017 CV STRESS TEST TRACING ONLY: CPT

## 2020-09-28 PROCEDURE — 2580000003 HC RX 258: Performed by: INTERNAL MEDICINE

## 2020-09-28 PROCEDURE — 93016 CV STRESS TEST SUPVJ ONLY: CPT | Performed by: INTERNAL MEDICINE

## 2020-09-28 PROCEDURE — 3430000000 HC RX DIAGNOSTIC RADIOPHARMACEUTICAL: Performed by: INTERNAL MEDICINE

## 2020-09-28 PROCEDURE — 78452 HT MUSCLE IMAGE SPECT MULT: CPT | Performed by: INTERNAL MEDICINE

## 2020-09-28 PROCEDURE — A9502 TC99M TETROFOSMIN: HCPCS | Performed by: INTERNAL MEDICINE

## 2020-09-28 PROCEDURE — 93018 CV STRESS TEST I&R ONLY: CPT | Performed by: INTERNAL MEDICINE

## 2020-09-28 PROCEDURE — 78452 HT MUSCLE IMAGE SPECT MULT: CPT

## 2020-09-28 PROCEDURE — 6360000002 HC RX W HCPCS: Performed by: INTERNAL MEDICINE

## 2020-09-28 RX ORDER — SODIUM CHLORIDE 0.9 % (FLUSH) 0.9 %
10 SYRINGE (ML) INJECTION PRN
Status: DISCONTINUED | OUTPATIENT
Start: 2020-09-28 | End: 2020-09-29 | Stop reason: HOSPADM

## 2020-09-28 RX ADMIN — REGADENOSON 0.4 MG: 0.08 INJECTION, SOLUTION INTRAVENOUS at 11:04

## 2020-09-28 RX ADMIN — TETROFOSMIN 31.5 MILLICURIE: 0.23 INJECTION, POWDER, LYOPHILIZED, FOR SOLUTION INTRAVENOUS at 11:04

## 2020-09-28 RX ADMIN — SODIUM CHLORIDE, PRESERVATIVE FREE 10 ML: 5 INJECTION INTRAVENOUS at 09:57

## 2020-09-28 RX ADMIN — SODIUM CHLORIDE, PRESERVATIVE FREE 10 ML: 5 INJECTION INTRAVENOUS at 11:05

## 2020-09-28 RX ADMIN — SODIUM CHLORIDE, PRESERVATIVE FREE 10 ML: 5 INJECTION INTRAVENOUS at 11:04

## 2020-09-28 RX ADMIN — TETROFOSMIN 10.7 MILLICURIE: 0.23 INJECTION, POWDER, LYOPHILIZED, FOR SOLUTION INTRAVENOUS at 09:58

## 2020-09-28 NOTE — PROCEDURES
[id number] Pending sale to [facility],[address] [facility] and Vascular Lab - [address]. Lois de souza, [address] [place] [address]  [phone]               Pharmacologic Stress Nuclear Gated SPECT Study    Name: FRANCESCO Canales Account Number: [de-identified]    :  1960          Sex: male         Date of Study:  2020    Height: 6' 4\" (193 cm)         Weight: 264 lb (119.7 kg)     Ordering Provider: My Herman          PCP: Meenakshi Michael MD      Cardiologist: My Herman             Interpreting Physician: My Herman  _________________________________________________________________________________    Indication:Diagnosis of coronary disease       Clinical History:   Patient has no known history of coronary artery disease. Resting ECG:    Sinus rhythm at 71 bpm, T wave inversion in the inferior leads and in V5 and V6, incomplete right bundle branch block and early transition. Procedure:   Pharmacologic stress testing was performed with regadenoson 0.4 mg for 15 seconds. The heart rate was 71 at baseline and mile to 89 beats during the infusion. The blood pressure at baseline was 106/78 and blood pressure at the end of infusion was 104/62. Blood pressure response was normal during the stress procedure. The patient experienced mild dizziness and shortness of breath during the infusion, which resolved. ECG during the infusion remained unchanged above abnormal baseline. IMAGING: Myocardial perfusion imaging was performed at rest 30-35 minutes following the intravenous injection of 10.7 mCi of (Tc-tetrofosmin) followed by 10 ml of Normal Saline. As per infusion protocol, the patient was injected intravenously with 31.5 mCi of (Tc-tetrofosmin) followed by 10 ml of Normal Saline. Gated post-stress tomographic imaging was performed 45 minutes after stress. FINDINGS: The overall quality of the study was good.     Left ventricular cavity size was noted to be normal during stress and rest. TID was 1.12. Rotational analog analysis demonstrated mild increase in bowel uptake noted on both stress and resting imaging. SPECT imaging revealed large inferior defect of moderate to severe intensity. This defect was fixed. This defect could be due to increased bowel uptake and or diaphragmatic attenuation artifact due to absence of wall motion abnormalities with an ejection fraction of 60% on gated images. Impression:    -Large fixed inferior defect probably due to increased bowel uptake and or diaphragmatic attenuation artifact.  -Absence of reversible defects.  -Absence of segmental wall motion abnormalities.  -Ejection fraction 60%. Thank you for sending your patient to this [employer].    Electronically signed by Ran Perry MD on 9/28/2020 at 12:26 PM

## 2020-09-30 ENCOUNTER — NURSE ONLY (OUTPATIENT)
Dept: NON INVASIVE DIAGNOSTICS | Age: 60
End: 2020-09-30
Payer: MEDICARE

## 2020-09-30 PROCEDURE — 93296 REM INTERROG EVL PM/IDS: CPT | Performed by: INTERNAL MEDICINE

## 2020-09-30 PROCEDURE — 93294 REM INTERROG EVL PM/LDLS PM: CPT | Performed by: INTERNAL MEDICINE

## 2020-10-01 ENCOUNTER — HOSPITAL ENCOUNTER (OUTPATIENT)
Dept: SLEEP CENTER | Age: 60
Discharge: HOME | End: 2020-10-01
Payer: MEDICARE

## 2020-10-01 PROCEDURE — G0399 HOME SLEEP TEST/TYPE 3 PORTA: HCPCS

## 2020-10-02 RX ORDER — SPIRONOLACTONE 25 MG/1
25 TABLET ORAL DAILY
Qty: 90 TABLET | Refills: 0 | Status: SHIPPED
Start: 2020-10-02 | End: 2020-12-17 | Stop reason: SDUPTHER

## 2020-10-02 RX ORDER — ATORVASTATIN CALCIUM 80 MG/1
TABLET, FILM COATED ORAL
Qty: 90 TABLET | Refills: 0 | Status: SHIPPED
Start: 2020-10-02 | End: 2020-12-17 | Stop reason: SDUPTHER

## 2020-10-02 NOTE — TELEPHONE ENCOUNTER
Last Appointment:  9/3/2020  Future Appointments   Date Time Provider Pratik Garcia   12/9/2020  8:45 AM DO Nathaly Barber ENT [facility]   12/15/2020  9:20 AM Krish Estrada MD [facility] NEURO [facility]   12/17/2020  1:00 PM Abhijit Larson MD [facility]

## 2020-10-04 NOTE — PROGRESS NOTES
[address] [address]                               SLEEP STUDY REPORT    PATIENT NAME: Lima Peña                  :        1960  MED REC NO:   [id number]                            ROOM:  ACCOUNT NO:   [de-identified]                           ADMIT DATE: 10/01/2020  PROVIDER:     Rachelle Cooper MD    DATE OF STUDY:  10/01/2020    REFERRING PROVIDER:  Krista Chacon M.D.    STUDY PERFORMED:  Polysomnography. INDICATION FOR POLYSOMNOGRAPHY:  Witnessed apnea, snore, restless sleep,  morning headaches, trouble with memory/concentration, and frequent  waking to urinate. CURRENT MEDICATIONS:  Spironolactone, Lipitor, Coreg, Lasix, Tradjenta,  lisinopril, metformin, Pepcid, baclofen, digoxin, Astelin, albuterol,  and Singulair. INTERPRETATION:  This polysomnogram was performed as a home sleep apnea  test.  An Marya NightOne monitoring device was utilized for the study. Total recording time was 389 minutes. RESPIRATION SUMMARY:  APNEA:  There were five apneic events, which were all obstructive, two  occurred in the supine position and maximum duration was 25 seconds. HYPOPNEA:  There were 12 hypopneic events, all occurred in the  non-supine position and maximum duration was 91 seconds. RESPIRATORY EVENT INDEX:  The respiratory event index was less than  three. OXYGEN SATURATION:  Average oxygen saturation was 93%. Lowest  saturation was 89%. HEART RATE SUMMARY:  Average heart rate during sleep was 71 beats per  minute. Maximum heart rate during sleep was 88 beats per minute and  minimum heart rate during sleep was 66 beats per minute. SNORE SUMMARY:  There was one snore. MISCELLANEOUS:  [place] Sleepiness Scale score is 3/24. BMI is 32.4 kg  per meter squared. Neck circumference is 19 inches. IMPRESSION:  1. No evidence of sleep apnea. 2.  Excellent oxygen saturation.   3. Essentially no snoring. SUGGESTIONS:  1.  Dr. Vinicio Kirk to discuss the results of study with the patient. 2.  No indication to treat for sleep apnea.         Emilia Parrish MD  Diplomat of Sleep Medicine    D: 10/03/2020 14:04:04       T: 10/03/2020 14:16:11     [email or web address]  Job#: [id number]     Doc#: [id number]    CC:

## 2020-10-27 ENCOUNTER — TELEPHONE (OUTPATIENT)
Dept: NON INVASIVE DIAGNOSTICS | Age: 60
End: 2020-10-27

## 2020-10-27 NOTE — TELEPHONE ENCOUNTER
----- Message from Nicole Matthews RN sent at 10/21/2020  9:18 AM EDT -----  Successful transmission received. Please call patient and give next appointment.

## 2020-11-10 ENCOUNTER — TELEPHONE (OUTPATIENT)
Dept: ENT CLINIC | Age: 60
End: 2020-11-10

## 2020-11-10 DIAGNOSIS — G43.019 INTRACTABLE MIGRAINE WITHOUT AURA AND WITHOUT STATUS MIGRAINOSUS: ICD-10-CM

## 2020-11-10 RX ORDER — MONTELUKAST SODIUM 10 MG/1
TABLET ORAL
Qty: 30 TABLET | Refills: 3 | Status: SHIPPED
Start: 2020-11-10 | End: 2020-12-17 | Stop reason: SDUPTHER

## 2020-11-13 RX ORDER — FLUTICASONE PROPIONATE 50 MCG
2 SPRAY, SUSPENSION (ML) NASAL DAILY
Qty: 1 BOTTLE | Refills: 3 | Status: SHIPPED | OUTPATIENT
Start: 2020-11-13 | End: 2022-03-03

## 2020-11-13 RX ORDER — AZELASTINE 1 MG/ML
2 SPRAY, METERED NASAL DAILY
Qty: 1 BOTTLE | Refills: 3 | Status: SHIPPED | OUTPATIENT
Start: 2020-11-13 | End: 2022-05-24

## 2020-11-13 RX ORDER — ALBUTEROL SULFATE 2.5 MG/3ML
2.5 SOLUTION RESPIRATORY (INHALATION) EVERY 6 HOURS PRN
Qty: 120 EACH | Refills: 5 | Status: SHIPPED | OUTPATIENT
Start: 2020-11-13 | End: 2021-09-27 | Stop reason: SDUPTHER

## 2020-11-13 RX ORDER — ALBUTEROL SULFATE 90 UG/1
2 AEROSOL, METERED RESPIRATORY (INHALATION) EVERY 6 HOURS PRN
Qty: 1 INHALER | Refills: 3 | Status: SHIPPED
Start: 2020-11-13 | End: 2020-12-17 | Stop reason: SDUPTHER

## 2020-11-30 RX ORDER — BACLOFEN 20 MG/1
20 TABLET ORAL 2 TIMES DAILY
Qty: 60 TABLET | Refills: 5 | Status: SHIPPED
Start: 2020-11-30 | End: 2020-12-21 | Stop reason: SDUPTHER

## 2020-12-09 ENCOUNTER — OFFICE VISIT (OUTPATIENT)
Dept: ENT CLINIC | Age: 60
End: 2020-12-09
Payer: MEDICARE

## 2020-12-09 ENCOUNTER — TELEPHONE (OUTPATIENT)
Dept: INTERNAL MEDICINE | Age: 60
End: 2020-12-09

## 2020-12-09 VITALS — WEIGHT: 275 LBS | HEIGHT: 76 IN | BODY MASS INDEX: 33.49 KG/M2 | TEMPERATURE: 98 F

## 2020-12-09 PROCEDURE — 99213 OFFICE O/P EST LOW 20 MIN: CPT | Performed by: OTOLARYNGOLOGY

## 2020-12-09 PROCEDURE — G8482 FLU IMMUNIZE ORDER/ADMIN: HCPCS | Performed by: OTOLARYNGOLOGY

## 2020-12-09 PROCEDURE — G8417 CALC BMI ABV UP PARAM F/U: HCPCS | Performed by: OTOLARYNGOLOGY

## 2020-12-09 PROCEDURE — G8427 DOCREV CUR MEDS BY ELIG CLIN: HCPCS | Performed by: OTOLARYNGOLOGY

## 2020-12-09 PROCEDURE — 4004F PT TOBACCO SCREEN RCVD TLK: CPT | Performed by: OTOLARYNGOLOGY

## 2020-12-09 PROCEDURE — 3017F COLORECTAL CA SCREEN DOC REV: CPT | Performed by: OTOLARYNGOLOGY

## 2020-12-09 RX ORDER — LISINOPRIL 10 MG/1
10 TABLET ORAL DAILY
Qty: 90 TABLET | Refills: 0 | Status: CANCELLED | OUTPATIENT
Start: 2020-12-09

## 2020-12-09 RX ORDER — MONTELUKAST SODIUM 10 MG/1
10 TABLET ORAL DAILY
Qty: 30 TABLET | Refills: 3 | Status: SHIPPED
Start: 2020-12-09 | End: 2021-03-29 | Stop reason: SDUPTHER

## 2020-12-09 RX ORDER — LISINOPRIL 10 MG/1
10 TABLET ORAL DAILY
Qty: 90 TABLET | Refills: 0 | Status: SHIPPED
Start: 2020-12-09 | End: 2021-03-15 | Stop reason: SDUPTHER

## 2020-12-09 RX ORDER — FLUTICASONE PROPIONATE 50 MCG
2 SPRAY, SUSPENSION (ML) NASAL DAILY
Qty: 1 BOTTLE | Refills: 3 | Status: SHIPPED
Start: 2020-12-09 | End: 2021-04-05 | Stop reason: ALTCHOICE

## 2020-12-09 RX ORDER — AZELASTINE 1 MG/ML
2 SPRAY, METERED NASAL 2 TIMES DAILY
Qty: 1 BOTTLE | Refills: 1 | Status: SHIPPED
Start: 2020-12-09 | End: 2020-12-17 | Stop reason: SDUPTHER

## 2020-12-09 NOTE — PROGRESS NOTES
[facility] Otolaryngology  Dr. Hall [place]. Florecita Carlson. Ms.Ed        Patient Name:  Karen Liang  :  1960     CHIEF C/O:    Chief Complaint   Patient presents with    Allergies     patient c/o post nasal drip       HISTORY OBTAINED FROM:   patient    HISTORY OF PRESENT ILLNESS:       Shalonda Mejía is a 63y.o. year old male, here today for follow up  allergic rhinitis and PND. Endorses compliance with saline, flonase, montelukast and astelin. May be open to allergy testing in the future. Overall he is doing well and experiencing improvement of his symptomsConstitutional: Negative for chills and fever. HENT: Positive for postnasal drip and rhinorrhea. Negative for ear discharge and hearing loss. Eyes: Positive for discharge and itching. Respiratory: Negative for cough and shortness of breath. Cardiovascular: Negative for chest pain and palpitations. Gastrointestinal: Negative for vomiting. Skin: Negative for rash. Allergic/Immunologic: Negative for environmental allergies. Neurological: Negative for dizziness and headaches. Hematological: Does not bruise/bleed easily. All other systems reviewed and are negative. Ht 6' 4\" (1.93 m)   Wt 268 lb (121.6 kg)   BMI 32.62 kg/m²   Physical Exam  Vitals signs and nursing note reviewed. Constitutional:       Appearance: He is well-developed. HENT:      Head: Normocephalic and atraumatic. Right Ear: Ear canal and external ear normal. Tympanic membrane is retracted. Left Ear: Tympanic membrane, ear canal and external ear normal.      Nose: Mucosal edema present. No nasal deformity or rhinorrhea. Right Sinus: No maxillary sinus tenderness or frontal sinus tenderness. Left Sinus: No maxillary sinus tenderness or frontal sinus tenderness. Eyes:      Pupils: Pupils are equal, round, and reactive to light. Neck:      Musculoskeletal: Normal range of motion. Thyroid: No thyromegaly. Trachea: No tracheal deviation. Cardiovascular:      Rate and Rhythm: Normal rate. Pulmonary:      Effort: Pulmonary effort is normal. No respiratory distress. Musculoskeletal: Normal range of motion. Lymphadenopathy:      Cervical: No cervical adenopathy. Skin:     General: Skin is warm. Findings: No erythema. Neurological:      Mental Status: He is alert. Cranial Nerves: No cranial nerve deficit. IMPRESSION/PLAN:  Allergic rhinitis, seasonal  On Astelin and Flonase, Singulair- continue   Continue saline spray  Add over the counter antihistamine for flare ups    Follow up in 6 months      Past Medical History:   Diagnosis Date    Arthritis     Atrial fibrillation ([address]     Bruising tendency (HCC)     Cardiomyopathy ([address]     probably viral, LV systolic dysfunction EF 99% July 2011    Cerebral artery occlusion with cerebral infarction [facility])     TIA    CHF (congestive heart failure) ([facility]     ef 20    Diabetes mellitus ([address]     Family history of colon cancer 7/21/2014    Headache(784.0)     Heart trouble July, 2011, February, 2012    Hospitalized (WHAT TYPE?)    History of blood transfusion     History of frequent ear infections     History of substance abuse ([facility] 10/6/2011    A. Abstinence since 1980's B. H/O significant alcohol consumption     Hx of blood clots     HEART    Hyperlipidemia     Hypertension     Memory loss     Recurrent infections 1985, 1995, 2005    ASK WHERE?     Speech problem     Vertebrobasilar TIAs 10/15/2015    2012      Past Surgical History:   Procedure Laterality Date    A-V CARDIAC PACEMAKER INSERTION Left 04/28/2017    Dual ICD removed Dual chamber Medtronic Pacer insertion by Dr Cheryl Dobbins  12/13/2013    aflutter ablation    CARDIAC DEFIBRILLATOR PLACEMENT  10/27/2011    Dr. Ahsan Donald, dual chamber  Medtronic    CARDIOVERSION  11-    Cardioversion   Dr. Kayla Mckeon    right    SINUS SURGERY polyps removed     TONSILLECTOMY      TRANSESOPHAGEAL ECHOCARDIOGRAM  12/8/2011         VASECTOMY  1998       Current Outpatient Medications:     azelastine (ASTELIN) 0.1 % nasal spray, 2 sprays by Nasal route 2 times daily Use in each nostril as directed, Disp: 1 Bottle, Rfl: 1    fluticasone (FLONASE) 50 MCG/ACT nasal spray, 2 sprays by Nasal route daily 2 sprays each nostril once daily, Disp: 1 Bottle, Rfl: 3    montelukast (SINGULAIR) 10 MG tablet, Take 1 tablet by mouth daily, Disp: 30 tablet, Rfl: 3    baclofen (LIORESAL) 20 MG tablet, Take 1 tablet by mouth 2 times daily, Disp: 60 tablet, Rfl: 11    baclofen (LIORESAL) 20 MG tablet, Take 1 tablet by mouth 2 times daily, Disp: 60 tablet, Rfl: 5    azelastine (ASTELIN) 0.1 % nasal spray, 2 sprays by Nasal route daily Use in each nostril as directed, Disp: 1 Bottle, Rfl: 3    albuterol (PROVENTIL) (2.5 MG/3ML) 0.083% nebulizer solution, Take 3 mLs by nebulization every 6 hours as needed for Wheezing, Disp: 120 each, Rfl: 5    albuterol sulfate HFA (PROVENTIL HFA) 108 (90 Base) MCG/ACT inhaler, Inhale 2 puffs into the lungs every 6 hours as needed for Wheezing, Disp: 1 Inhaler, Rfl: 3    fluticasone (FLONASE) 50 MCG/ACT nasal spray, 2 sprays by Nasal route daily 2 sprays each nostril once a day, Disp: 1 Bottle, Rfl: 3    montelukast (SINGULAIR) 10 MG tablet, TAKE ONE TABLET BY MOUTH EVERY DAY AT BEDTIME, Disp: 30 tablet, Rfl: 3    spironolactone (ALDACTONE) 25 MG tablet, Take 1 tablet by mouth daily, Disp: 90 tablet, Rfl: 0    atorvastatin (LIPITOR) 80 MG tablet, take 1 tablet by mouth every evening, Disp: 90 tablet, Rfl: 0    carvedilol (COREG) 12.5 MG tablet, Take 1 tablet by mouth 2 times daily (with meals), Disp: 180 tablet, Rfl: 3    furosemide (LASIX) 20 MG tablet, Take 1 tablet by mouth daily, Disp: 90 tablet, Rfl: 0    linagliptin (TRADJENTA) 5 MG tablet, Take 1 tablet by mouth daily, Disp: 30 tablet, Rfl: 3    lisinopril (PRINIVIL;ZESTRIL) 10 MG tablet, Take 1 tablet by mouth daily, Disp: 90 tablet, Rfl: 0    metFORMIN (GLUCOPHAGE) 1000 MG tablet, TAKE ONE TABLET BY MOUTH TWICE A DAY,WITH MEALS, Disp: 180 tablet, Rfl: 0    famotidine (PEPCID) 20 MG tablet, Take 1 tablet by mouth 2 times daily, Disp: 180 tablet, Rfl: 1    digoxin (DIGOX) 250 MCG tablet, Take 1 tablet by mouth daily, Disp: 90 tablet, Rfl: 3    aspirin 81 MG tablet, Take 1 tablet by mouth daily, Disp: 30 tablet, Rfl: 5    Misc. Devices MISC, 1 each by Does not apply route once as needed for up to 1 dose. Nebulizer and tubing., Disp: 1 Device, Rfl: 0  Levaquin [levofloxacin in d5w]; Penicillins; and Sulfa antibiotics  Social History     Tobacco Use    Smoking status: Current Every Day Smoker     Packs/day: 1.00     Years: 37.00     Pack years: 37.00     Types: Cigarettes    Smokeless tobacco: Never Used    Tobacco comment: currently 0.5 ppd, 9/17/20   Substance Use Topics    Alcohol use: Yes     Alcohol/week: 0.0 standard drinks     Frequency: 2-4 times a month     Drinks per session: 1 or 2     Binge frequency: Never    Drug use: Not Currently     Types: Marijuana, Cocaine, Opiates , Methamphetamines     Comment: past use in his 19's; none since     Family History   Problem Relation Age of Onset    Cancer Mother         colon, mets to liver and lungs    No Known Problems Sister     High Blood Pressure Brother     High Cholesterol Brother      Patient seen exam for history of chronic allergic rhinitis nasal congestion postnasal drainage with a history of environmental allergens. Patient continue on singular, Flonase, and Astelin as well. Proper instructions reviewed, and avoidance measures were also discussed. Patient will follow-up in 6 months, or sooner with any noted increase in symptoms. Dr. Dany Aburto D.O.  Ms. Riki Montero.  Otolaryngology Facial Plastic Surgery  :  [facility] Otolaryngology/Facial Plastic Surgery Res[facility]  [facility]ate

## 2020-12-14 RX ORDER — FUROSEMIDE 20 MG/1
20 TABLET ORAL DAILY
Qty: 90 TABLET | Refills: 0 | Status: SHIPPED
Start: 2020-12-14 | End: 2021-03-23 | Stop reason: SDUPTHER

## 2020-12-14 NOTE — TELEPHONE ENCOUNTER
Last Appointment:  9/30/20  Future Appointments   Date Time Provider Pratik Garcia   12/17/2020  1:00 PM Africa Grande MD [facility] IM [facility]   12/21/2020 10:00 AM Noreen Cloud MD [facility] NEURO [facility]   12/31/2020 10:00 AM SCHEDULE, REMOTE CHECK FITO ELECTRO PHYS [facility]   6/9/2021  9:45 AM Aubree Lee, [address] [place] [address] ENT [facility]

## 2020-12-16 NOTE — PROGRESS NOTES
 azelastine (ASTELIN) 0.1 % nasal spray 2 sprays by Nasal route 2 times daily Use in each nostril as directed 1 Bottle 3    albuterol sulfate HFA (PROVENTIL HFA) 108 (90 Base) MCG/ACT inhaler Inhale 2 puffs into the lungs every 6 hours as needed for Wheezing 1 Inhaler 3    montelukast (SINGULAIR) 10 MG tablet TAKE ONE TABLET BY MOUTH EVERY DAY AT BEDTIME 90 tablet 1    spironolactone (ALDACTONE) 25 MG tablet Take 1 tablet by mouth daily 90 tablet 1    atorvastatin (LIPITOR) 80 MG tablet take 1 tablet by mouth every evening 90 tablet 1    linagliptin (TRADJENTA) 5 MG tablet Take 1 tablet by mouth daily 90 tablet 1    furosemide (LASIX) 20 MG tablet Take 1 tablet by mouth daily 90 tablet 0    fluticasone (FLONASE) 50 MCG/ACT nasal spray 2 sprays by Nasal route daily 2 sprays each nostril once daily 1 Bottle 3    montelukast (SINGULAIR) 10 MG tablet Take 1 tablet by mouth daily 30 tablet 3    lisinopril (PRINIVIL;ZESTRIL) 10 MG tablet Take 1 tablet by mouth daily 90 tablet 0    metFORMIN (GLUCOPHAGE) 1000 MG tablet TAKE ONE TABLET BY MOUTH TWICE A DAY,WITH MEALS 180 tablet 0    baclofen (LIORESAL) 20 MG tablet Take 1 tablet by mouth 2 times daily 60 tablet 11    baclofen (LIORESAL) 20 MG tablet Take 1 tablet by mouth 2 times daily 60 tablet 5    azelastine (ASTELIN) 0.1 % nasal spray 2 sprays by Nasal route daily Use in each nostril as directed 1 Bottle 3    albuterol (PROVENTIL) (2.5 MG/3ML) 0.083% nebulizer solution Take 3 mLs by nebulization every 6 hours as needed for Wheezing 120 each 5    fluticasone (FLONASE) 50 MCG/ACT nasal spray 2 sprays by Nasal route daily 2 sprays each nostril once a day 1 Bottle 3    carvedilol (COREG) 12.5 MG tablet Take 1 tablet by mouth 2 times daily (with meals) 180 tablet 3    famotidine (PEPCID) 20 MG tablet Take 1 tablet by mouth 2 times daily 180 tablet 1    digoxin (DIGOX) 250 MCG tablet Take 1 tablet by mouth daily 90 tablet 3  aspirin 81 MG tablet Take 1 tablet by mouth daily 30 tablet 5       I have reviewed all pertinent PMHx, PSHx, FamHx, SocialHx, medications, and allergies and updated history as appropriate. OBJECTIVE:    VS: /77 (Site: Right Upper Arm, Position: Sitting, Cuff Size: Medium Adult)   Pulse 86   Temp 96.4 °F (35.8 °C) (Oral)   Resp 16   Ht 6' 4\" (1.93 m)   Wt 274 lb 4.8 oz (124.4 kg)   BMI 33.39 kg/m²   General appearance: Alert, Awake, Oriented times 3, no distress  Lungs: Lungs clear to auscultation bilaterally. No rhonchi, crackles or wheezes  Heart: S1 S2  Regular rate and rhythm. No rub, murmur or gallop  Abdomen: Abdomen soft but obese, non-tender. non-distended BS normal. No masses, organomegaly, no guarding rebound or rigidity. Extremities: No edema, Peripheral pulses palpable 2/4    ASSESSMENT/PLAN:    Chronic Chest pain, intermittent, ?   Muscle spasm Prinzmetal angina  -Has been having chest pain since 6 to 7 years ago, daily, last about seconds to minutes   -Lexiscan MPS, 1/11/2017, Normal perfusion.  EF 50%    - stress test 9/2020, no reversible perfusion defect, large fixed inferior defect, EF 60%   - Follows Dr. Zohra Woo  >> Advised patient to follow-up with Dr. Zohra Woo regarding the results of her stress test and the diagnosis of his chest pain, and his recommendations regarding calcium channel blockers    Dyslipidemia  ASCVD: 18.1%  -Lipid panel 5/2020: total luci 142, tri 108, HDL 39, LDL 81  -on Atorvastatin 80mg QD      Hx of Non ischemic cardiomyopathy' 2011   -S/P downgrading of dual chamber ICD to dual chamber pacemaker 4/28/17  -Last ECHO 4/17 EF 65% with Stage 1 diastolic dysfunction   - On ASA, spironolactone  25 mg daily, Lisinopril 10mg QD, Coreg 12.5 BID, Digoxin 250mcg QD, Lasix 20mg QD.   - Stress test 9/2020- EF 60%, no reversible perfusion defect   -Follows with Dr. Aurelia Patton and Dr. Zohra Woo     Paroxysmal atrial flutter  -S/P AV kamar ablation 12/2013  -[id number] Score: 4

## 2020-12-17 ENCOUNTER — OFFICE VISIT (OUTPATIENT)
Dept: INTERNAL MEDICINE | Age: 60
End: 2020-12-17
Payer: MEDICARE

## 2020-12-17 VITALS
RESPIRATION RATE: 16 BRPM | WEIGHT: 274.3 LBS | HEART RATE: 86 BPM | DIASTOLIC BLOOD PRESSURE: 77 MMHG | HEIGHT: 76 IN | TEMPERATURE: 96.4 F | SYSTOLIC BLOOD PRESSURE: 123 MMHG | BODY MASS INDEX: 33.4 KG/M2

## 2020-12-17 DIAGNOSIS — E83.52 HYPERCALCEMIA: ICD-10-CM

## 2020-12-17 DIAGNOSIS — J40 BRONCHITIS: ICD-10-CM

## 2020-12-17 DIAGNOSIS — E78.5 DYSLIPIDEMIA: ICD-10-CM

## 2020-12-17 DIAGNOSIS — Z23 NEED FOR INFLUENZA VACCINATION: Primary | ICD-10-CM

## 2020-12-17 DIAGNOSIS — I42.0 DILATED CARDIOMYOPATHY (HCC): ICD-10-CM

## 2020-12-17 DIAGNOSIS — E11.8 TYPE 2 DIABETES MELLITUS WITH COMPLICATION, WITHOUT LONG-TERM CURRENT USE OF INSULIN (HCC): ICD-10-CM

## 2020-12-17 DIAGNOSIS — G43.019 INTRACTABLE MIGRAINE WITHOUT AURA AND WITHOUT STATUS MIGRAINOSUS: ICD-10-CM

## 2020-12-17 DIAGNOSIS — J30.9 ALLERGIC RHINITIS, UNSPECIFIED SEASONALITY, UNSPECIFIED TRIGGER: ICD-10-CM

## 2020-12-17 LAB — HBA1C MFR BLD: 7.7 %

## 2020-12-17 PROCEDURE — G8482 FLU IMMUNIZE ORDER/ADMIN: HCPCS | Performed by: INTERNAL MEDICINE

## 2020-12-17 PROCEDURE — 90686 IIV4 VACC NO PRSV 0.5 ML IM: CPT

## 2020-12-17 PROCEDURE — G8427 DOCREV CUR MEDS BY ELIG CLIN: HCPCS | Performed by: INTERNAL MEDICINE

## 2020-12-17 PROCEDURE — 2022F DILAT RTA XM EVC RTNOPTHY: CPT | Performed by: INTERNAL MEDICINE

## 2020-12-17 PROCEDURE — 6360000002 HC RX W HCPCS

## 2020-12-17 PROCEDURE — 3017F COLORECTAL CA SCREEN DOC REV: CPT | Performed by: INTERNAL MEDICINE

## 2020-12-17 PROCEDURE — 83036 HEMOGLOBIN GLYCOSYLATED A1C: CPT | Performed by: INTERNAL MEDICINE

## 2020-12-17 PROCEDURE — 3051F HG A1C>EQUAL 7.0%<8.0%: CPT | Performed by: INTERNAL MEDICINE

## 2020-12-17 PROCEDURE — 4004F PT TOBACCO SCREEN RCVD TLK: CPT | Performed by: INTERNAL MEDICINE

## 2020-12-17 PROCEDURE — 99212 OFFICE O/P EST SF 10 MIN: CPT | Performed by: INTERNAL MEDICINE

## 2020-12-17 PROCEDURE — 99213 OFFICE O/P EST LOW 20 MIN: CPT | Performed by: INTERNAL MEDICINE

## 2020-12-17 PROCEDURE — G0008 ADMIN INFLUENZA VIRUS VAC: HCPCS

## 2020-12-17 PROCEDURE — G8417 CALC BMI ABV UP PARAM F/U: HCPCS | Performed by: INTERNAL MEDICINE

## 2020-12-17 RX ORDER — SPIRONOLACTONE 25 MG/1
25 TABLET ORAL DAILY
Qty: 90 TABLET | Refills: 1 | Status: SHIPPED
Start: 2020-12-17 | End: 2021-06-27 | Stop reason: SDUPTHER

## 2020-12-17 RX ORDER — ATORVASTATIN CALCIUM 80 MG/1
TABLET, FILM COATED ORAL
Qty: 90 TABLET | Refills: 1 | Status: SHIPPED | OUTPATIENT
Start: 2020-12-17 | End: 2021-07-26 | Stop reason: SDUPTHER

## 2020-12-17 RX ORDER — ALBUTEROL SULFATE 90 UG/1
2 AEROSOL, METERED RESPIRATORY (INHALATION) EVERY 6 HOURS PRN
Qty: 1 INHALER | Refills: 3 | Status: SHIPPED
Start: 2020-12-17 | End: 2021-03-29 | Stop reason: SDUPTHER

## 2020-12-17 RX ORDER — MONTELUKAST SODIUM 10 MG/1
TABLET ORAL
Qty: 90 TABLET | Refills: 1 | Status: SHIPPED
Start: 2020-12-17 | End: 2020-12-21 | Stop reason: SDUPTHER

## 2020-12-17 RX ORDER — AZELASTINE 1 MG/ML
2 SPRAY, METERED NASAL 2 TIMES DAILY
Qty: 1 BOTTLE | Refills: 3 | Status: SHIPPED
Start: 2020-12-17 | End: 2021-04-05 | Stop reason: ALTCHOICE

## 2020-12-17 NOTE — PROGRESS NOTES
Attending Physician Statement  I have discussed the case, including pertinent history and exam findings with the resident. I agree with the assessment, plan and orders as documented by the resident. Pt presented for regular follow up, reported chest pain for 6 years and each occurrence lasts a few seconds only, previous cardiac work up showed EF 19% that has been improved to 60%. Recommended to have follow up with cardiology for previous nuclear imaging study. Also has been seen by neurology for chronic headache and needs follow up. DM has been treated with two PO meds and A1C was 7.5 and gliptin was added, recommended to check home BG regularly. Also has history of AF that was cardioverted.   Carson Mehta MD

## 2020-12-17 NOTE — PATIENT INSTRUCTIONS
Follow-up with cardiology  Ask about cause of chest pain, trial of calcium channel blocker, ?  Prinzmetal   - ask about results of stress test     Follow-up referral to neurologist- headache specialist

## 2020-12-21 ENCOUNTER — VIRTUAL VISIT (OUTPATIENT)
Dept: NEUROLOGY | Age: 60
End: 2020-12-21
Payer: MEDICARE

## 2020-12-21 PROCEDURE — 99443 PR PHYS/QHP TELEPHONE EVALUATION 21-30 MIN: CPT | Performed by: PSYCHIATRY & NEUROLOGY

## 2020-12-21 NOTE — PROGRESS NOTES
This 56-year-old right-handed man was referred for evaluation management of longstanding headaches. He was deemed a good historian, as well as his wife. This visit was conducted per telephone. His medications were now baclofen, spironolactone, Flonase, Singulair, Lipitor, metformin, famotidine, lisinopril, digoxin, topiramate, inhalers, aspirin, furosemide and Coreg. 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 His ejection fraction was now over 60%. He still suffered from intermittent atrial fibrillation, but very briefly and rarely. His cardiologist did not prescribe anticoagulants. He again reported non-insulin-dependent diabetes mellitus, hypertension, hyperlipidemia, gastroesophageal reflux disease and chronic obstructive lung disease. Previously, his physicians had questioned embolic strokes from his viral cardiomyopathy. However, MRIs were never obtained to confirm this diagnosis. CT scans of his head were unremarkable. I previously saw this individual over 7 years ago, when I suspected tension type headaches. He was prescribed baclofen, but never took that medication. I also questioned sleep apnea contributing to his headaches. He was currently on baclofen 20 mg at night only; the second dose in the daytime made him too drowsy. Recent sleep studies did not reveal obstructive sleep apnea. The patient had seen another local neurologist who suspected migraines. He was placed on low-dose topiramate, without effect. He still noted bilateral, daily, pressure sensations. He denied inciting or relieving events. He also recalled brief, lightninglike sensations occurring on both sides of his head, as well as in his left periorbital region. He reported no other visual issues, bright lights, spinning sensations, speech difficulties, other pains or loss of consciousness.     He was again diagnosed with tension type headaches. He continued on baclofen 20 mg at night. He felt his headaches had improved; his wife noted a 60% reduction in the spells. He is now willing to take 40 mg of baclofen at night. He slept better with the baclofen at night. He reported losing vision in his left eye, diagnosed as a left optic neuropathyprobably ischemic. He barely saw moving objects in the left eye. His right eye was intact. There is never any return. He denied other neurological events. He was stable medically, despite his comorbidities. He was eating well. He was practicing proper social distancing and mask wear during the pandemic. Review of systems was otherwise unremarkable. He was afebrile and breathing comfortably. He was an elderly gentleman in no acute distress, who was alert, cooperative and oriented. He remained pleasant. His skin was unremarkable. He denied any chest pains, palpitations or shortness of breath. His limbs displayed no abnormalities. Neurological examination produced an intact mental status. Cranial nerve testing revealed no abnormalities. He moved all his limbs well. He appreciated light touch in all limbs. Coordination testing was unrevealing. He walked well. Laboratory data included an unremarkable CMP, except for a GFR of 60. [facility] Hemoglobin A1c was 7.7. A previous B12 level was 222. CBC with differential was unremarkable in the past.  Hepatitis panel 4 years ago was unrevealing. A CT scan of his head last year was normal.    This individual's neurological examination remains unrevealing. His headaches are primarily tension type spells, now improved with low-dose baclofen. I will increase that medication t 40 mg at night, to, hopefully, improve his headaches and sleep. Fortunately, he does not suffer from obstructive sleep apnea. 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   Fortunately, there has been no return. He is stable medically despite his multiple comorbidities. Fortunately, his viral cardiomyopathy has resolved. However, he is plagued with brief, intermittent atrial fibrillation. He will follow with electrophysiology serially. His blood sugars are still poorly controlled. He will return in 6 months. Baclofen was increased to 40 mg at night. Report back in 1 month. He will call at any time if problems arise. I spent 30 minutes with the patient with over 50 % spent in counseling and disease management. All patient issues were addressed and all questions were answered. William Valle was a 60-year-old individual who was evaluated via telephone on 12/21/2020. Consent:  He and/or health care decision maker is aware that that he may receive a bill for this telephone service, depending on his insurance coverage, and has provided verbal consent to proceed--- yes. Documentation:  I communicated with the patient and/or health care decision maker about tension type headaches. Details of this discussion including any medical advice provided per telephone. I affirmed this is a Patient Initiated Episode with an Established Patient who has not had a related appointment within my department in the past 7 days or scheduled within the next 24 hours. Again I spent 30 minutes with the patient.       Osorio Carr MD

## 2021-01-04 ENCOUNTER — HOSPITAL ENCOUNTER (OUTPATIENT)
Age: 61
Discharge: HOME | End: 2021-01-04
Payer: MEDICARE

## 2021-01-04 DIAGNOSIS — E83.52 HYPERCALCEMIA: ICD-10-CM

## 2021-01-04 LAB
ANION GAP SERPL CALCULATED.3IONS-SCNC: 17 MMOL/L (ref 7–16)
BUN BLDV-MCNC: 20 MG/DL (ref 8–23)
CALCIUM SERPL-MCNC: 9.9 MG/DL (ref 8.6–10.2)
CHLORIDE BLD-SCNC: 103 MMOL/L (ref 98–107)
CO2: 19 MMOL/L (ref 22–29)
CREAT SERPL-MCNC: 1.1 MG/DL (ref 0.7–1.2)
GFR AFRICAN AMERICAN: >60
GFR NON-AFRICAN AMERICAN: >60 ML/MIN/1.73
GLUCOSE BLD-MCNC: 148 MG/DL (ref 74–99)
PARATHYROID HORMONE INTACT: 29 PG/ML (ref 15–65)
POTASSIUM SERPL-SCNC: 5 MMOL/L (ref 3.5–5)
SODIUM BLD-SCNC: 139 MMOL/L (ref 132–146)

## 2021-01-04 PROCEDURE — 36415 COLL VENOUS BLD VENIPUNCTURE: CPT

## 2021-01-04 PROCEDURE — 83970 ASSAY OF PARATHORMONE: CPT

## 2021-01-04 PROCEDURE — 80048 BASIC METABOLIC PNL TOTAL CA: CPT

## 2021-01-21 ENCOUNTER — TELEPHONE (OUTPATIENT)
Dept: NON INVASIVE DIAGNOSTICS | Age: 61
End: 2021-01-21

## 2021-01-21 NOTE — TELEPHONE ENCOUNTER
Patient was due 12/31/2020 and has not yet sent a remote transmission to the office. Mailed the patient a letter to remote them to hook up their monitor and send us a remote transmission.

## 2021-01-22 ENCOUNTER — TELEPHONE (OUTPATIENT)
Dept: NEUROLOGY | Age: 61
End: 2021-01-22

## 2021-01-22 NOTE — TELEPHONE ENCOUNTER
Patient called in today per Dr Khalil's request. He states that since he has increased Baclofen to 40 mg at night, he is doing better.   Electronically signed by Ирина Eagle MA on 1/22/21 at 3:05 PM EST

## 2021-01-25 NOTE — TELEPHONE ENCOUNTER
MA notified patient of Dr Khalil's response.   Electronically signed by Rodríguez العراقي MA on 1/25/21 at 7:49 AM EST

## 2021-02-03 ENCOUNTER — NURSE ONLY (OUTPATIENT)
Dept: NON INVASIVE DIAGNOSTICS | Age: 61
End: 2021-02-03
Payer: MEDICARE

## 2021-02-03 DIAGNOSIS — I48.0 PAROXYSMAL ATRIAL FIBRILLATION (HCC): ICD-10-CM

## 2021-02-03 DIAGNOSIS — Z95.0 PACEMAKER: Primary | ICD-10-CM

## 2021-02-03 DIAGNOSIS — I49.5 SINUS NODE DYSFUNCTION (HCC): ICD-10-CM

## 2021-02-03 PROCEDURE — 93296 REM INTERROG EVL PM/IDS: CPT | Performed by: INTERNAL MEDICINE

## 2021-02-03 PROCEDURE — 93294 REM INTERROG EVL PM/LDLS PM: CPT | Performed by: INTERNAL MEDICINE

## 2021-02-28 DIAGNOSIS — I42.0 DILATED CARDIOMYOPATHY (HCC): ICD-10-CM

## 2021-03-01 RX ORDER — DIGOXIN 250 MCG
250 TABLET ORAL DAILY
Qty: 90 TABLET | Refills: 3 | Status: SHIPPED
Start: 2021-03-01 | End: 2021-09-27 | Stop reason: SDUPTHER

## 2021-03-01 NOTE — PROGRESS NOTES
See [employer] [facility] [place] report. Remote monitoring reviewed over a 90 day period. End of 90 day monitoring period date of service 2.3.2021.

## 2021-03-02 ENCOUNTER — TELEPHONE (OUTPATIENT)
Dept: NON INVASIVE DIAGNOSTICS | Age: 61
End: 2021-03-02

## 2021-03-02 NOTE — TELEPHONE ENCOUNTER
----- Message from Jeffrey Gardner RN sent at 3/1/2021  9:15 AM EST -----  Overdue for OV, please schedule. Successful transmission received. Please call patient and give next appointment.

## 2021-03-15 ENCOUNTER — TELEPHONE (OUTPATIENT)
Dept: INTERNAL MEDICINE | Age: 61
End: 2021-03-15

## 2021-03-15 DIAGNOSIS — E11.8 TYPE 2 DIABETES MELLITUS WITH COMPLICATION, WITHOUT LONG-TERM CURRENT USE OF INSULIN (HCC): Chronic | ICD-10-CM

## 2021-03-15 DIAGNOSIS — I42.0 DILATED CARDIOMYOPATHY (HCC): ICD-10-CM

## 2021-03-15 RX ORDER — LISINOPRIL 10 MG/1
10 TABLET ORAL DAILY
Qty: 90 TABLET | Refills: 0 | Status: SHIPPED
Start: 2021-03-15 | End: 2021-06-07 | Stop reason: SDUPTHER

## 2021-03-15 NOTE — TELEPHONE ENCOUNTER
Last Appointment:  12-17-20  Future Appointments   Date Time Provider Pratik Garcia   3/29/2021 10:30 AM Alessandra Patrick MD [facility]   4/21/2021  9:00 AM GHANSHYAM Ornelas - NP ELECTRO PHYS [facility]   5/5/2021  1:25 PM SCHEDULE, REMOTE CHECK FITO ELECTRO PHYS [facility]   6/9/2021  9:45 AM DO Nathaly [facility]   6/22/2021  9:40 AM Vladimir Gee MD [id number] [facility]

## 2021-03-23 DIAGNOSIS — I42.0 DILATED CARDIOMYOPATHY (HCC): ICD-10-CM

## 2021-03-23 RX ORDER — FUROSEMIDE 20 MG/1
20 TABLET ORAL DAILY
Qty: 90 TABLET | Refills: 0 | Status: SHIPPED
Start: 2021-03-23 | End: 2021-06-16 | Stop reason: SDUPTHER

## 2021-03-29 ENCOUNTER — VIRTUAL VISIT (OUTPATIENT)
Dept: INTERNAL MEDICINE | Age: 61
End: 2021-03-29
Payer: MEDICARE

## 2021-03-29 DIAGNOSIS — I42.0 DILATED CARDIOMYOPATHY (HCC): ICD-10-CM

## 2021-03-29 DIAGNOSIS — E11.8 TYPE 2 DIABETES MELLITUS WITH COMPLICATION, WITHOUT LONG-TERM CURRENT USE OF INSULIN (HCC): ICD-10-CM

## 2021-03-29 DIAGNOSIS — I42.8 CARDIOMYOPATHY, NONISCHEMIC (HCC): ICD-10-CM

## 2021-03-29 DIAGNOSIS — Z12.2 ENCOUNTER FOR SCREENING FOR LUNG CANCER: Primary | ICD-10-CM

## 2021-03-29 DIAGNOSIS — Z95.810 S/P ICD (INTERNAL CARDIAC DEFIBRILLATOR) PROCEDURE: ICD-10-CM

## 2021-03-29 DIAGNOSIS — I48.0 PAROXYSMAL ATRIAL FIBRILLATION (HCC): ICD-10-CM

## 2021-03-29 DIAGNOSIS — K21.9 GASTROESOPHAGEAL REFLUX DISEASE WITHOUT ESOPHAGITIS: ICD-10-CM

## 2021-03-29 DIAGNOSIS — J40 BRONCHITIS: ICD-10-CM

## 2021-03-29 DIAGNOSIS — E78.5 DYSLIPIDEMIA: ICD-10-CM

## 2021-03-29 PROCEDURE — 3046F HEMOGLOBIN A1C LEVEL >9.0%: CPT | Performed by: INTERNAL MEDICINE

## 2021-03-29 PROCEDURE — 99213 OFFICE O/P EST LOW 20 MIN: CPT | Performed by: INTERNAL MEDICINE

## 2021-03-29 PROCEDURE — 4004F PT TOBACCO SCREEN RCVD TLK: CPT | Performed by: INTERNAL MEDICINE

## 2021-03-29 PROCEDURE — 3017F COLORECTAL CA SCREEN DOC REV: CPT | Performed by: INTERNAL MEDICINE

## 2021-03-29 PROCEDURE — G8417 CALC BMI ABV UP PARAM F/U: HCPCS | Performed by: INTERNAL MEDICINE

## 2021-03-29 PROCEDURE — G8482 FLU IMMUNIZE ORDER/ADMIN: HCPCS | Performed by: INTERNAL MEDICINE

## 2021-03-29 PROCEDURE — 2022F DILAT RTA XM EVC RTNOPTHY: CPT | Performed by: INTERNAL MEDICINE

## 2021-03-29 PROCEDURE — G8427 DOCREV CUR MEDS BY ELIG CLIN: HCPCS | Performed by: INTERNAL MEDICINE

## 2021-03-29 RX ORDER — FAMOTIDINE 20 MG/1
20 TABLET, FILM COATED ORAL 2 TIMES DAILY
Qty: 180 TABLET | Refills: 1 | Status: SHIPPED | OUTPATIENT
Start: 2021-03-29 | End: 2021-09-27 | Stop reason: SDUPTHER

## 2021-03-29 RX ORDER — ALBUTEROL SULFATE 90 UG/1
2 AEROSOL, METERED RESPIRATORY (INHALATION) EVERY 6 HOURS PRN
Qty: 1 INHALER | Refills: 3 | Status: SHIPPED | OUTPATIENT
Start: 2021-03-29 | End: 2021-09-27 | Stop reason: SDUPTHER

## 2021-03-29 RX ORDER — MONTELUKAST SODIUM 10 MG/1
10 TABLET ORAL DAILY
Qty: 30 TABLET | Refills: 3 | Status: SHIPPED | OUTPATIENT
Start: 2021-03-29 | End: 2022-07-11

## 2021-03-29 NOTE — PATIENT INSTRUCTIONS
Monitor BP at least 1x/week    Do blood work for next clinic visit    If HgbA1c elevated > 8, call clinic for earlier appt    Call clinic for 6 months follow-up    Fu CT lung screen

## 2021-03-29 NOTE — PROGRESS NOTES
[facility]    Attending Physician Statement  I have discussed the case, including pertinent history and exam findings with the resident physician. Video visit during the [facility] public health emergency. I agree with the assessment, plan and orders as documented by the resident. I have reviewed all pertinent PMHx, PSHx, FamHx, SocialHx, medications, and allergies and updated history as appropriate. Patient presents for routine follow up of medical problems. Chronic chest pain - follows with cardio. No further medication changes or work-up recommended at this time. DM - HbA1c now 7.7. Has not been checking sugars at home. Needs further strips. On metformin and linagliptin   Continue same and check HbA1c. A-fib -stable   Continue digoxin and carvedilol. Remainder of medical problems as per resident note.     Jaci Hall MD   3/29/2021 11:47 AM

## 2021-03-29 NOTE — PROGRESS NOTES
3/27/2015 : FEV1 87% predicted , + post bronchodilator response 14% ? FEV1 volume change. 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 Lanette Dunne   -On ASA and Statin     L eye vision loss, able to do only light perception  ? Retinal infarct, ? CVA     Chronic headache   - sharp headaches 10/10 lasting fro about 30 seconds   - 0n  baclofen 20 mg   - Sleep study: negative       Gastroesophageal reflux disease, esophagitis presence not specified  -    on famotidine 20 MG tablet; BID      OA  -follows orthopedics  - knee brace ordered per ortho note      Smoking  -Smokes 1PPD since 27+ years; currently 1/2PPD   -Pre contemplative at this time wants to stop it on new year        Health care maintenance   -HIV screen and Hep C negative   -Diabetic foot exam 6/2020, + calluses, refuses podiatry consult   -Flu shot given 12/2020  -Shingles script provided 2/26/2020   -YIGQ [id number]   -Diabetic eye exam- follows ophthalmologist last 2018  -colonoscopy in 2016  -Urine microalbuminuria  6.1 5/1/2020  - covid vaccine 1st dose done 3/2021  - CT lung screening ordered 3/29/2021    Review of Systems   Constitutional: Negative for activity change, appetite change, chills, diaphoresis, fatigue, fever and unexpected weight change. HENT: Negative. Eyes: Negative. Respiratory: Negative for apnea, cough, chest tightness, shortness of breath and stridor. Cardiovascular: Negative for chest pain and palpitations. Gastrointestinal: Negative for abdominal pain. Endocrine: Negative. Genitourinary: Negative for difficulty urinating, discharge, dysuria and hematuria. Musculoskeletal: Negative. Skin: Negative. Neurological: Negative for dizziness, seizures, syncope, light-headedness and headaches. Hematological: Negative. Psychiatric/Behavioral: Negative.         Prior to Visit Medications    Medication Sig Taking?  Authorizing Provider   furosemide (LASIX) 20 MG tablet Take 1 tablet by mouth daily  Rafaela Acosta DO   metFORMIN (GLUCOPHAGE) 1000 MG tablet TAKE ONE TABLET BY MOUTH TWICE A DAY,WITH MEALS  Josue Navas MD   lisinopril (PRINIVIL;ZESTRIL) 10 MG tablet Take 1 tablet by mouth daily  Josue Navas MD   digoxin ([address] [facility]) 250 MCG tablet Take 1 tablet by mouth daily  Naz Alarcon MD   azelastine (ASTELIN) 0.1 % nasal spray 2 sprays by Nasal route 2 times daily Use in each nostril as directed  Gale Walker MD   albuterol sulfate HFA (PROVENTIL HFA) 108 (90 Base) MCG/ACT inhaler Inhale 2 puffs into the lungs every 6 hours as needed for Denzel Macdonald MD   spironolactone (ALDACTONE) 25 MG tablet Take 1 tablet by mouth daily  Gale Walker MD   atorvastatin (LIPITOR) 80 MG tablet take 1 tablet by mouth every evening  Gale Walker MD   linagliptin (TRADJENTA) 5 MG tablet Take 1 tablet by mouth daily  Gale Walker MD   fluticasone (FLONASE) 50 MCG/ACT nasal spray 2 sprays by Nasal route daily 2 sprays each nostril once daily  Matt Aburto DO   montelukast (SINGULAIR) 10 MG tablet Take 1 tablet by mouth daily  Matt Aburto DO   baclofen (LIORESAL) 20 MG tablet Take 1 tablet by mouth 2 times daily  Patient taking differently: Take 20 mg by mouth 2 times daily Takes 1 tablet at night  Daquan Heredia MD   azelastine (ASTELIN) 0.1 % nasal spray 2 sprays by Nasal route daily Use in each nostril as directed  Gale Walker MD   albuterol (PROVENTIL) (2.5 MG/3ML) 0.083% nebulizer solution Take 3 mLs by nebulization every 6 hours as needed for Wheezing  Sravanthi Dumas MD   fluticasone (FLONASE) 50 MCG/ACT nasal spray 2 sprays by Nasal route daily 2 sprays each nostril once a day  Sravanthi Dumas MD   carvedilol (COREG) 12.5 MG tablet Take 1 tablet by mouth 2 times daily (with meals)  Naz Alarcon MD   famotidine (PEPCID) 20 MG tablet Take 1 tablet by mouth 2 times daily  Fatou Jo MD   aspirin 81 MG tablet Take 1 tablet by mouth daily  Dk Ferrer MD   Misc. Devices MISC 1 each by Does not apply route once as needed for up to 1 dose. Nebulizer and tubing. Gela Rankin DO       Social History     Tobacco Use    Smoking status: Current Every Day Smoker     Packs/day: 1.00     Years: 37.00     Pack years: 37.00     Types: Cigarettes    Smokeless tobacco: Never Used    Tobacco comment: currently 0.5 ppd, 9/17/20   Substance Use Topics    Alcohol use: Yes     Alcohol/week: 0.0 standard drinks     Frequency: 2-4 times a month     Drinks per session: 1 or 2     Binge frequency: Never    Drug use: Not Currently     Types: Marijuana, Cocaine, Opiates , Methamphetamines     Comment: past use in his 20's; none since            PHYSICAL EXAMINATION:  [ INSTRUCTIONS:  \"[x]\" Indicates a positive item  \"[]\" Indicates a negative item  -- DELETE ALL ITEMS NOT EXAMINED]  Vital Signs: (As obtained by patient/caregiver or practitioner observation)    Constitutional: [x] Appears well-developed and well-nourished [] No apparent distress        Mental status  [x] Alert and awake  [x] Oriented to person/place/time [x]Able to follow commands      Eyes:  EOM    [x]  Normal  [] Abnormal-  Sclera  [x]  Normal  [] Abnormal -         Discharge [x]  None visible  [] Abnormal -    HENT:   [x] Normocephalic, atraumatic.   [] Abnormal   [] Mouth/Throat: Mucous membranes are moist.     External Ears [] Normal  [] Abnormal-     Neck: [x] No visualized mass     Pulmonary/Chest: [x] Respiratory effort normal.  [] No visualized signs of difficulty breathing or respiratory distres      Neurological:        [x] No Facial Asymmetry (Cranial nerve 7 motor function) (limited exam to video visit)          [x] No gaze palsy           Skin:        [x] No significant exanthematous lesions or discoloration noted on facial skin               Psychiatric:       [x] Normal Affect [x] No

## 2021-03-30 ASSESSMENT — ENCOUNTER SYMPTOMS
EYES NEGATIVE: 1
APNEA: 0
SHORTNESS OF BREATH: 0
CHEST TIGHTNESS: 0
ABDOMINAL PAIN: 0
STRIDOR: 0
COUGH: 0

## 2021-03-31 ASSESSMENT — ENCOUNTER SYMPTOMS
ORTHOPNEA: 0
WHEEZING: 0
COUGH: 0
SHORTNESS OF BREATH: 0

## 2021-03-31 NOTE — PROGRESS NOTES
Electrophysiology Outpatient Follow-Up Note    Doris Tripp  1960  Date of Service: 4/5/2021  Referring Provider/PCP: Jasmyne Mccall MD  Cardiology: Paloma Levi MD  Electrophysiologist: Olu Omalley DO    Patient Active Problem List    Diagnosis Date Noted    S/P ICD (internal cardiac defibrillator) procedure 10/27/2011     Priority: High     Overview Note:     1. Medtronic [place], [place], model P6770018, serial [id number] implanted 10/27/11  2. Right atrial lead is a Medtronic, model B1092146, serial [id number]  3. Right ventricular lead is a Medtronic, model Y039460, serial [id number]  4. Successful RF ablation of typical, counter-clockwise atrial flutter 12/13/2013  5. ECHO 5/7/2014: LVE, CLVH, normal wall motion, stage I diastolic dysfunction, limited study          Cardiomyopathy, nonischemic ([address] 10/06/2011     Priority: High     Overview Note:     1. ECHO 8/22/2011: EF=30%, RADHA   2. ECHO 7/6/2011: EF=25%, severe LVE, mild MR  3. MPS 7/8/2011: Normal perfusion, reduced EF  4. Cath 7/20/2011: Normal coronaries, EF=20%  5. Successful RF ablation of typical, counter-clockwise atrial flutter 12/13/2013  6. ECHO 5/7/2014: LVE, CLVH, normal wall motion, stage I diastolic dysfunction, LVEF of 60%.       Obesity 01/09/2013     Priority: Medium     Class: Chronic    Chronic headaches 01/09/2013     Priority: Medium     Class: Chronic    Intractable migraine without aura and without status migrainosus 02/26/2020    Gastroesophageal reflux disease 02/26/2020    Vision loss of left eye 02/26/2020    Pacemaker 11/20/2017    Sinus node dysfunction ([address] 04/28/2017    Paroxysmal atrial fibrillation ([address] 10/15/2015    Type 2 diabetes mellitus with complication, without long-term current use of insulin (HCC) 04/08/2015    Allergic rhinitis 11/05/2014       Current Outpatient Medications   Medication Sig Dispense Refill    albuterol sulfate HFA (PROVENTIL HFA) 108 (90 Base) MCG/ACT inhaler Inhale 2 puffs into the lungs every 6 hours as needed for Wheezing 1 Inhaler 3    montelukast (SINGULAIR) 10 MG tablet Take 1 tablet by mouth daily 30 tablet 3    famotidine (PEPCID) 20 MG tablet Take 1 tablet by mouth 2 times daily 180 tablet 1    furosemide (LASIX) 20 MG tablet Take 1 tablet by mouth daily 90 tablet 0    metFORMIN (GLUCOPHAGE) 1000 MG tablet TAKE ONE TABLET BY MOUTH TWICE A DAY,WITH MEALS 180 tablet 0    lisinopril (PRINIVIL;ZESTRIL) 10 MG tablet Take 1 tablet by mouth daily 90 tablet 0    digoxin (DIGOX) 250 MCG tablet Take 1 tablet by mouth daily 90 tablet 3    spironolactone (ALDACTONE) 25 MG tablet Take 1 tablet by mouth daily 90 tablet 1    atorvastatin (LIPITOR) 80 MG tablet take 1 tablet by mouth every evening 90 tablet 1    linagliptin (TRADJENTA) 5 MG tablet Take 1 tablet by mouth daily 90 tablet 1    baclofen (LIORESAL) 20 MG tablet Take 1 tablet by mouth 2 times daily (Patient taking differently: Take 20 mg by mouth 2 times daily Takes 2 tablet at night) 60 tablet 11    azelastine (ASTELIN) 0.1 % nasal spray 2 sprays by Nasal route daily Use in each nostril as directed 1 Bottle 3    albuterol (PROVENTIL) (2.5 MG/3ML) 0.083% nebulizer solution Take 3 mLs by nebulization every 6 hours as needed for Wheezing 120 each 5    fluticasone (FLONASE) 50 MCG/ACT nasal spray 2 sprays by Nasal route daily 2 sprays each nostril once a day 1 Bottle 3    carvedilol (COREG) 12.5 MG tablet Take 1 tablet by mouth 2 times daily (with meals) 180 tablet 3    aspirin 81 MG tablet Take 1 tablet by mouth daily 30 tablet 5    Misc. Devices MISC 1 each by Does not apply route once as needed for up to 1 dose. Nebulizer and tubing. 1 Device 0     No current facility-administered medications for this visit.          Allergies   Allergen Reactions    Levaquin [Levofloxacin In D5w]     Penicillins Anaphylaxis    Sulfa Antibiotics Anaphylaxis       7/30/2018 SUBJECTIVE: Marc Carpenter presents to the office today for the management of: Atrial flutter s/p atrial flutter ablation, NICMP,  ICD, s/p downgrade ICD to PPM in situ. He is accompanied by his wife today. Mr. Jerson Smith states he feels well since his last OV. He denies chest pain, shortness of breath, orthopnea or PND. He denies any lightheadedness, dizziness, near-syncope or syncope. He denies any palpitations. 07/30/2019 SUBJECTIVE: Naresh Payne presents to the office today for the management of: Atrial flutter s/p atrial flutter ablation, NICMP,  ICD, s/p downgrade ICD to PPM in situ. He presents today for his one year office follow-up. He has remained off [address] given no prolonged episodes of recurrent AF with the understanding this would be re-initiated if necessary. He has followed remotely. At his last OV with Dr Kelley Napoles 5/30/19 he was doing well overall. He had occasional \"twinges\" across his chest which he had for years. He was able to continue his daily routine with no issues. he is accompanied by his wife today. He is feeling well and offers no complaints. He denies any HF symptoms and appears euvolemic. There have been no arrhythmias noted on his interrogation since November, which at that time, were short in duration. He denies angina, dyspnea, syncope, orthopnea and PND.     04/05/2021 Naresh Payne is seen in outpatient office follow-up. He is enrolled in [employer] remote monitoring. He has not been seen since the above office follow-up. He has followed-up with Primary Service in March with continued complaints of chronic chest discomfort (?) muscle spasm/Prinzmetal angina; now follows with Dr Sonia Ross. A stress test 9/28/20 was stable with an EF 60%. He denies angina, dyspnea, syncope, orthopnea and PND. Review of Systems   Respiratory: Negative for cough, shortness of breath and wheezing. Cardiovascular: Negative for chest pain, palpitations, orthopnea, leg swelling and PND. All other systems reviewed and are negative.     PHYSICAL 2021 at 01:30 PM in Radiology [facility] CT SCAN 2) Dx:  Chest pain, unspecified type     Narrative    Sarah Castillo MD     2020 12:26 PM            [id number] [address] - [phone]                Pharmacologic Stress Nuclear Gated SPECT Study     Name: [facility] Account Number:   [de-identified]     :  1960          Sex: male          Date of Study:  2020     Height: 6' 4\" (193 cm)         Weight: 264 lb (119.7 kg)     Ordering Provider: Tamara Mendoza          PCP: Marylou Parker MD       Cardiologist: Tamara Mendoza             Interpreting Physician: Tamara Mendoza   __________________________________________________________________   _______________     Indication:Diagnosis of coronary disease       Clinical History:   Patient has no known history of coronary   artery disease. Resting ECG:     Sinus rhythm at 71 bpm, T wave inversion in the inferior leads   and in V5 and V6, incomplete right bundle branch block and early   transition. Procedure:   Pharmacologic stress testing was performed with regadenoson 0.4   mg for 15 seconds.  The heart rate was 71 at baseline and mile to   89 beats during the infusion. The blood pressure at baseline was   106/78 and blood pressure at the end of infusion was 104/62.     Blood pressure response was normal during the stress procedure. The patient experienced mild dizziness and shortness of breath   during the infusion, which resolved. ECG during the infusion remained unchanged above abnormal   baseline.      IMAGING: Myocardial perfusion imaging was performed at rest 30-35   minutes following the intravenous injection of 10.7 mCi of   (Tc-tetrofosmin) followed by 10 ml of Normal Saline.  As per   infusion protocol, the patient was injected intravenously with   31.5 mCi of (Tc-tetrofosmin) followed by 10 ml of Normal Saline.     Gated post-stress tomographic imaging was performed 45 minutes   after stress. FINDINGS: The overall quality of the study was good. Left ventricular cavity size was noted to be normal during stress   and rest. TID was 1. 12. Rotational analog analysis demonstrated mild increase in bowel   uptake noted on both stress and resting imaging. SPECT imaging revealed large inferior defect of moderate to   severe intensity.  This defect was fixed.  This defect could be   due to increased bowel uptake and or diaphragmatic attenuation   artifact due to absence of wall motion abnormalities with an   ejection fraction of 60% on gated images. Impression:     -Large fixed inferior defect probably due to increased bowel   uptake and or diaphragmatic attenuation artifact.   -Absence of reversible defects.   -Absence of segmental wall motion abnormalities.   -Ejection fraction 60%. Thank you for sending your patient to this [facility]. Electronically signed by Chase Luna MD on 9/28/2020 at 12:26 PM                Stress test 1/11/17 Marilou Jennifer)  - no ischemia  - EF 50%    TTE(4/3/2017):   Transthoracic Echocardiography Report (TTE)     Demographics      Patient Name       Junior Bautista     Gender              Male                     [facility]      Medical Record     [id number]         Room Number         Rocio Wright      Account #         [de-identified]       Procedure Date      04/03/2017      Corporate ID                        Ordering Physician Jolene Calderon DO      Accession Number   [id number]        Referring Physician Lola Lara DO      Date of Birth      1960       Sonographer         Erin Tucker [facility]      Age                56 year(s)       Interpreting       Kiera Aguila MD                                       Physician                                          Any Other     Procedure    Type of Study      TTE procedure:Echo Limited Study.     Procedure Date  Date: 04/03/2017 Start: 10:03 AM    Study Location: Echo Lab  Technical Quality: Adequate visualization    Indications:Atrial fibrillation and Cardiomyopathy. Patient Status: Routine    Height: 76 inches Weight: 278 pounds BSA: 2.55 m^2 BMI: 33.84 kg/m^2    BP: 130/80 mmHg    Allergies    - Penicillins:Reaction - Anaphylaxis->Anaphylactic Shock; Severity -      Severe.      - Sulfa drugs:(unknown reaction).   - Other drug:(patient states he's allergic to all antibiotics).     Findings      Left Ventricle   Left ventricular size is grossly normal.   Normal systolic function with LVEF estimated at 65%.   There is doppler evidence of stage I diastolic dysfunction.      Right Ventricle   Normal right ventricle structure and function.      Left Atrium   Normal left atrium by volume index(23ml/m2).      Right Atrium   Normal right atrium.      Mitral Valve   Structurally normal mitral valves.   Physiologic and/or trace mitral regurgitation is present.   No evidence of hemodynamically significant mitral stenosis.      Tricuspid Valve   Normal tricuspid valve structure and function.   Mild tricuspid regurgitation.    RVSP could not be estimated secondary to inadequate TR envelope.      Aortic Valve   Trileaflet aortic valve with good leaflet separation.   No hemodynamically significant aortic stenosis or regurgitation.      Pulmonic Valve   Pulmonary valve not well visualized.      Pericardial Effusion   No evidence for hemodynamically significant pericardial effusion.      Pleural Effusion   No evidence of pleural effusion.      Aorta   Normal aortic root and ascending aorta.   Miscellaneous   The inferior vena cava diameter is normal with normal respiratory   variation.      Conclusions      Summary   stage I diastolic dysfunction.   Otherwise normal study.      Signature      ----------------------------------------------------------------   Electronically signed by Mauricio Mauro MD(Interpreting   physician) on 04/04/2017 12:02 PM  ----------------------------------------------------------------     M-Mode/2D Measurements & Calculations      LV Diastolic      LV Systolic Dimension: 3.8 cm   Dimension: 5.6 cm LV Volume Diastolic: 986.8 ml   LV GX:96.5 %      LV Volume Systolic: 36.9 ml   LV PW Diastolic:  LV EDV/LV EDV Index: 100.1 DS/38   RV Diastolic   5.7 cm            ml/m^2LV ESV/LV ESV Index: 29.7    Dimension: 2.7 cm   LV PW Systolic:   XN/47YR/ m^2   1.4 cm            EF Calculated: 70.3 %   Septum Diastolic: LV Mass Index: 55 l/min*m^2        LA volume/Index: 60.1   0.7 cm                                               ml /16MO/F^4   Septum Systolic:                                     RA Area: 17.1 cm^2   1.4 cm            LVOT: 1.8 cm      LV Mass: 139.86 g     Doppler Measurements & Calculations      MV Peak E-Wave: 0.62 m/s   MV Peak A-Wave: 0.8 m/s   MV E/A Ratio: 0.77                                                    TR Velocity:2.09 m/s   MV Deceleration Time: 254 msec                TR Gradient:17.44 mmHg      MV E' Septal Velocity: 0.08 m/s   MV E' Lateral Velocity: 0.09 m/s      Impressions:     1). NICMP  1. ECHO 8/22/2011: EF=30%, RADHA   2. ECHO 7/6/2011: EF=25%, severe LVE, mild MR  3. MPS 7/8/2011: Normal perfusion, reduced EF  4. Cath 7/20/2011: Normal coronaries, EF=20%  5. Successful RF ablation of typical, counter-clockwise atrial flutter 12/13/2013  6. ECHO 5/7/2014: LVE, CLVH, normal wall motion, stage I diastolic dysfunction, LVEF of 60%. 7. Echo 4/2017: LVEF--65%  8. Stress test 9/28/2020: stable, EF 60%(see above)    2). H/O paroxysmal atrial flutter  1. Chronic anticoagulation with Coumadin  2. LASHAUN 12/2011: + CAROLA thrombus  3. DCCV 2/14/2012 and 1/25/2013: Sinus rhythm  4. Successful RF ablation of typical, counter-clockwise atrial flutter 12/13/2013  5. ECHO 5/7/2014: LVE, CLVH, normal wall motion, stage I diastolic dysfunction, limited study  6.  Successful RF ablation of typical, counter-clockwise atrial flutter 12/13/2013  7. ECHO 5/7/2014: LVE, CLVH, normal wall motion, stage I diastolic dysfunction, limited study  8. Off OAC-continue to monitor for sustained recurrence  9. AF burden <0.1%    3). Dual chamber pacemaker in situ  1. Medtronic [place], [place], model [id number], serial [id number] implanted 10/27/11  2. Normal function--at [facility]  3. Downgrade to PPM from an ICD d/t normalization of LV function and EF--4/28/17    4). Prior H/O substance abuse  1. Abstinence since 1980's  2. H/O significant alcohol consumption    5). H/O TIA  1. 2012    6). Type II DM  Lab Results   Component Value Date    LABA1C 7.7 12/17/2020     No results found for: EAG    7). H/O headaches    8). Dual chamber PPM   1. MARILU, model #: [id number], serial #: [id number]. 2. DOI: April 28, 2017    Plan:    Mr Curry's pacemaker function remains stable and programmed accordingly based on the above interrogation. He is enrolled in remote monitoring with an AF burden <0.1%. and remains off [employer]. We will plan for re-initiation of [employer] if he has recurrent atrial arrhythmias. 1. He will send remote transmissions Q91 days x3 followed by a one year in-office visit. 2. No changes were made in his medications today. 3. One year in-office follow-up and device interrogation. Thank you for allowing me to participate in your patients care. I have spent a total of 25 minutes with the patient  reviewing the above stated recommendations.  And a total of >50% of that time involved face-to-face time providing counseling and or coordination of care with the other providers    Edu Key, GHANSHYAM-SAAD, [address] [place] [address] Physicians      CC: Dr Oumou Mccarthy

## 2021-04-05 ENCOUNTER — OFFICE VISIT (OUTPATIENT)
Dept: NON INVASIVE DIAGNOSTICS | Age: 61
End: 2021-04-05
Payer: MEDICARE

## 2021-04-05 VITALS
OXYGEN SATURATION: 99 % | DIASTOLIC BLOOD PRESSURE: 78 MMHG | BODY MASS INDEX: 33.97 KG/M2 | RESPIRATION RATE: 16 BRPM | HEART RATE: 78 BPM | SYSTOLIC BLOOD PRESSURE: 118 MMHG | HEIGHT: 76 IN | WEIGHT: 279 LBS

## 2021-04-05 DIAGNOSIS — Z95.0 PACEMAKER: Primary | ICD-10-CM

## 2021-04-05 DIAGNOSIS — I49.5 SINUS NODE DYSFUNCTION (HCC): ICD-10-CM

## 2021-04-05 PROCEDURE — 4004F PT TOBACCO SCREEN RCVD TLK: CPT | Performed by: NURSE PRACTITIONER

## 2021-04-05 PROCEDURE — G8417 CALC BMI ABV UP PARAM F/U: HCPCS | Performed by: NURSE PRACTITIONER

## 2021-04-05 PROCEDURE — 3017F COLORECTAL CA SCREEN DOC REV: CPT | Performed by: NURSE PRACTITIONER

## 2021-04-05 PROCEDURE — G8427 DOCREV CUR MEDS BY ELIG CLIN: HCPCS | Performed by: NURSE PRACTITIONER

## 2021-04-05 PROCEDURE — 99213 OFFICE O/P EST LOW 20 MIN: CPT | Performed by: NURSE PRACTITIONER

## 2021-04-05 PROCEDURE — 93280 PM DEVICE PROGR EVAL DUAL: CPT | Performed by: NURSE PRACTITIONER

## 2021-04-20 ENCOUNTER — TELEPHONE (OUTPATIENT)
Dept: CASE MANAGEMENT | Age: 61
End: 2021-04-20

## 2021-04-20 NOTE — TELEPHONE ENCOUNTER
I called the patient and I spoke with his wife Anderson Jones and she confirmed patient's CT lung screening at [facility] on 4/21/2021 at 1:30 pm.  I reminded the patient to arrive at 1:00 pm, enter through the main entrance, and register. Patient confirmed.               Electronically signed by Magdaleno Hall on 4/20/21 at 10:35 AM EDT

## 2021-04-21 ENCOUNTER — HOSPITAL ENCOUNTER (OUTPATIENT)
Dept: CT IMAGING | Age: 61
Discharge: HOME | End: 2021-04-23
Payer: MEDICARE

## 2021-04-21 DIAGNOSIS — Z12.2 ENCOUNTER FOR SCREENING FOR LUNG CANCER: ICD-10-CM

## 2021-04-21 PROCEDURE — 71271 CT THORAX LUNG CANCER SCR C-: CPT

## 2021-04-22 ENCOUNTER — TELEPHONE (OUTPATIENT)
Dept: CASE MANAGEMENT | Age: 61
End: 2021-04-22

## 2021-05-05 ENCOUNTER — NURSE ONLY (OUTPATIENT)
Dept: NON INVASIVE DIAGNOSTICS | Age: 61
End: 2021-05-05
Payer: MEDICARE

## 2021-05-05 DIAGNOSIS — I49.5 SINUS NODE DYSFUNCTION (HCC): ICD-10-CM

## 2021-05-05 DIAGNOSIS — Z95.0 PACEMAKER: Primary | ICD-10-CM

## 2021-06-04 PROCEDURE — 93296 REM INTERROG EVL PM/IDS: CPT | Performed by: INTERNAL MEDICINE

## 2021-06-04 PROCEDURE — 93294 REM INTERROG EVL PM/LDLS PM: CPT | Performed by: INTERNAL MEDICINE

## 2021-06-04 NOTE — PROGRESS NOTES
See [facility] [place] report. Remote monitoring reviewed over a 90 day period. End of 90 day monitoring period date of service 5.5.2021.

## 2021-06-07 ENCOUNTER — TELEPHONE (OUTPATIENT)
Dept: NON INVASIVE DIAGNOSTICS | Age: 61
End: 2021-06-07

## 2021-06-07 DIAGNOSIS — I42.0 DILATED CARDIOMYOPATHY (HCC): ICD-10-CM

## 2021-06-07 NOTE — TELEPHONE ENCOUNTER
----- Message from Jacinto Garcia RN sent at 6/4/2021 10:25 AM EDT -----  Successful transmission received. Please call patient and give next appointment.

## 2021-06-08 RX ORDER — LISINOPRIL 10 MG/1
10 TABLET ORAL DAILY
Qty: 90 TABLET | Refills: 0 | Status: SHIPPED
Start: 2021-06-08 | End: 2021-09-08 | Stop reason: SDUPTHER

## 2021-06-14 DIAGNOSIS — E11.8 TYPE 2 DIABETES MELLITUS WITH COMPLICATION, WITHOUT LONG-TERM CURRENT USE OF INSULIN (HCC): Chronic | ICD-10-CM

## 2021-06-16 ENCOUNTER — PATIENT MESSAGE (OUTPATIENT)
Dept: INTERNAL MEDICINE | Age: 61
End: 2021-06-16

## 2021-06-16 DIAGNOSIS — I42.0 DILATED CARDIOMYOPATHY (HCC): ICD-10-CM

## 2021-06-16 RX ORDER — FUROSEMIDE 20 MG/1
20 TABLET ORAL DAILY
Qty: 90 TABLET | Refills: 0 | Status: SHIPPED
Start: 2021-06-16 | End: 2021-09-20 | Stop reason: SDUPTHER

## 2021-06-16 NOTE — TELEPHONE ENCOUNTER
Last Appointment:  3/29/2021  Future Appointments   Date Time Provider Pratik Garcia   6/22/2021  9:40 AM [facility]   7/2/2021  9:45 AM DO Nathaly Sanderson ENT [facility]   8/4/2021 12:30 PM SCHEDULE, REMOTE CHECK FITO ELECTRO PHYS [facility]

## 2021-06-27 DIAGNOSIS — I42.0 DILATED CARDIOMYOPATHY (HCC): ICD-10-CM

## 2021-06-28 RX ORDER — SPIRONOLACTONE 25 MG/1
25 TABLET ORAL DAILY
Qty: 90 TABLET | Refills: 0 | Status: SHIPPED
Start: 2021-06-28 | End: 2021-09-27 | Stop reason: SDUPTHER

## 2021-06-28 NOTE — TELEPHONE ENCOUNTER
Last Appointment:  3/29/2021  Future Appointments   Date Time Provider Pratik Mabryi   7/2/2021  9:45 AM DO Nathaly Schaefer ENT [facility]   8/4/2021 12:30 PM SCHEDULE, REMOTE CHECK FITO ELECTRO PHYS [facility]   8/25/2021 10:40 [facility]   9/27/2021  9:00 AM Abhishek Shin MD [facility]

## 2021-07-01 DIAGNOSIS — I42.0 DILATED CARDIOMYOPATHY (HCC): ICD-10-CM

## 2021-07-01 RX ORDER — CARVEDILOL 12.5 MG/1
12.5 TABLET ORAL 2 TIMES DAILY WITH MEALS
Qty: 180 TABLET | Refills: 3 | Status: SHIPPED
Start: 2021-07-01 | End: 2021-09-27 | Stop reason: SDUPTHER

## 2021-07-02 ENCOUNTER — OFFICE VISIT (OUTPATIENT)
Dept: ENT CLINIC | Age: 61
End: 2021-07-02
Payer: MEDICARE

## 2021-07-02 VITALS
SYSTOLIC BLOOD PRESSURE: 135 MMHG | DIASTOLIC BLOOD PRESSURE: 79 MMHG | HEART RATE: 90 BPM | HEIGHT: 76 IN | BODY MASS INDEX: 32.76 KG/M2 | WEIGHT: 269 LBS

## 2021-07-02 DIAGNOSIS — J30.1 ALLERGIC RHINITIS DUE TO POLLEN, UNSPECIFIED SEASONALITY: Primary | ICD-10-CM

## 2021-07-02 DIAGNOSIS — K06.8 LESION OF GINGIVA: ICD-10-CM

## 2021-07-02 DIAGNOSIS — K08.9 POOR DENTITION: ICD-10-CM

## 2021-07-02 PROCEDURE — 3017F COLORECTAL CA SCREEN DOC REV: CPT | Performed by: OTOLARYNGOLOGY

## 2021-07-02 PROCEDURE — 99213 OFFICE O/P EST LOW 20 MIN: CPT | Performed by: OTOLARYNGOLOGY

## 2021-07-02 PROCEDURE — G8428 CUR MEDS NOT DOCUMENT: HCPCS | Performed by: OTOLARYNGOLOGY

## 2021-07-02 PROCEDURE — 4004F PT TOBACCO SCREEN RCVD TLK: CPT | Performed by: OTOLARYNGOLOGY

## 2021-07-02 PROCEDURE — G8417 CALC BMI ABV UP PARAM F/U: HCPCS | Performed by: OTOLARYNGOLOGY

## 2021-07-02 NOTE — PROGRESS NOTES
[facility] Otolaryngology  Dr. Linda De Jesus. Ochoa Comes. Ms.Ed        Patient Name:  Matthias Vang  :  1960     CHIEF C/O:    Chief Complaint   Patient presents with    Other     6 mo f/u sinus       HISTORY OBTAINED FROM: patient    HISTORY OF PRESENT ILLNESS:        Killer is a 63y.o. year old male, here today for follow up  allergic rhinitis and PND. Endorses compliance with saline, flonase, montelukast and astelin. Overall he is doing well without much PND or congestion. Denies recent sinus infection. Denies vertigo, hearing changes, vertigo. Denies f/c. Constitutional: Negative for chills and fever. HENT: Negative for ear discharge and hearing loss. Eyes: Negative for discharge and itching. Respiratory: Negative for cough and shortness of breath. Cardiovascular: Negative for chest pain and palpitations. Gastrointestinal: Negative for vomiting. Skin: Negative for rash. Allergic/Immunologic: Negative for environmental allergies. Neurological: Negative for dizziness and headaches. Hematological: Does not bruise/bleed easily. All other systems reviewed and are negative. Ht 6' 4\" (1.93 m)   Wt 268 lb (121.6 kg)   BMI 32.62 kg/m²   Physical Exam  Vitals signs and nursing note reviewed. Constitutional:       Appearance: He is well-developed. HENT:      Head: Normocephalic and atraumatic. Right Ear: Ear canal and external ear normal. Tympanic membrane is retracted. Left Ear: Tympanic membrane, ear canal and external ear normal.      Nose: Mucosal edema present. No nasal deformity or rhinorrhea. Right Sinus: No maxillary sinus tenderness or frontal sinus tenderness. Left Sinus: No maxillary sinus tenderness or frontal sinus tenderness. Poor dentition, with lower left tooth socket exposure, poor gingival health  Eyes:      Pupils: Pupils are equal, round, and reactive to light. Neck:      Musculoskeletal: Normal range of motion. Thyroid: No thyromegaly. carvedilol (COREG) 12.5 MG tablet, Take 1 tablet by mouth 2 times daily (with meals), Disp: 180 tablet, Rfl: 3    spironolactone (ALDACTONE) 25 MG tablet, Take 1 tablet by mouth daily, Disp: 90 tablet, Rfl: 0    furosemide (LASIX) 20 MG tablet, Take 1 tablet by mouth daily, Disp: 90 tablet, Rfl: 0    metFORMIN (GLUCOPHAGE) 1000 MG tablet, Take 1 tablet by mouth 2 times daily (with meals), Disp: 180 tablet, Rfl: 3    lisinopril (PRINIVIL;ZESTRIL) 10 MG tablet, Take 1 tablet by mouth daily, Disp: 90 tablet, Rfl: 0    albuterol sulfate HFA (PROVENTIL HFA) 108 (90 Base) MCG/ACT inhaler, Inhale 2 puffs into the lungs every 6 hours as needed for Wheezing, Disp: 1 Inhaler, Rfl: 3    montelukast (SINGULAIR) 10 MG tablet, Take 1 tablet by mouth daily, Disp: 30 tablet, Rfl: 3    famotidine (PEPCID) 20 MG tablet, Take 1 tablet by mouth 2 times daily, Disp: 180 tablet, Rfl: 1    digoxin (DIGOX) 250 MCG tablet, Take 1 tablet by mouth daily, Disp: 90 tablet, Rfl: 3    atorvastatin (LIPITOR) 80 MG tablet, take 1 tablet by mouth every evening, Disp: 90 tablet, Rfl: 1    linagliptin (TRADJENTA) 5 MG tablet, Take 1 tablet by mouth daily, Disp: 90 tablet, Rfl: 1    baclofen (LIORESAL) 20 MG tablet, Take 1 tablet by mouth 2 times daily (Patient taking differently: Take 20 mg by mouth 2 times daily Takes 2 tablet at night), Disp: 60 tablet, Rfl: 11    azelastine (ASTELIN) 0.1 % nasal spray, 2 sprays by Nasal route daily Use in each nostril as directed, Disp: 1 Bottle, Rfl: 3    albuterol (PROVENTIL) (2.5 MG/3ML) 0.083% nebulizer solution, Take 3 mLs by nebulization every 6 hours as needed for Wheezing, Disp: 120 each, Rfl: 5    fluticasone (FLONASE) 50 MCG/ACT nasal spray, 2 sprays by Nasal route daily 2 sprays each nostril once a day, Disp: 1 Bottle, Rfl: 3    aspirin 81 MG tablet, Take 1 tablet by mouth daily, Disp: 30 tablet, Rfl: 5    Misc. Devices MISC, 1 each by Does not apply route once as needed for up to 1 dose. Nebulizer and tubing., Disp: 1 Device, Rfl: 0  Levaquin [levofloxacin in d5w], Penicillins, and Sulfa antibiotics  Social History     Tobacco Use    Smoking status: Current Every Day Smoker     Packs/day: 1.00     Years: 37.00     Pack years: 37.00     Types: Cigarettes    Smokeless tobacco: Never Used    Tobacco comment: currently 0.5 ppd, 9/17/20   Vaping Use    Vaping Use: Former    Start date: 5/1/2018   Carola Santillan Quit date: 6/1/2020    Substances: Always   Substance Use Topics    Alcohol use: Yes     Alcohol/week: 0.0 standard drinks    Drug use: Not Currently     Types: Marijuana, Cocaine, Opiates , Methamphetamines     Comment: past use in his 19's; none since     Family History   Problem Relation Age of Onset    Cancer Mother         colon, mets to liver and lungs    No Known Problems Sister     High Blood Pressure Brother     High Cholesterol Brother        IMPRESSION/PLAN:  Allergic rhinitis, seasonal  On Astelin and Flonase, Singulair- continue   Continue saline spray  Add over the counter antihistamine for flare ups  Recommend cessation of smoking  Follow up in 6 months              Shyam Steele  1960      I have discussed the case, including pertinent history and exam findings with the resident. I have seen and examined the patient and the key elements of the encounter have been performed by me. I agree with the assessment, plan and orders as documented by the resident. Patient here for follow up of medical problems. Remainder of medical problems as per resident note.       [facility],   7/27/21

## 2021-07-07 ENCOUNTER — TELEPHONE (OUTPATIENT)
Dept: ENT CLINIC | Age: 61
End: 2021-07-07

## 2021-07-07 DIAGNOSIS — I42.0 DILATED CARDIOMYOPATHY (HCC): ICD-10-CM

## 2021-07-07 RX ORDER — SPIRONOLACTONE 25 MG/1
25 TABLET ORAL DAILY
Qty: 90 TABLET | Refills: 0 | OUTPATIENT
Start: 2021-07-07

## 2021-07-07 NOTE — TELEPHONE ENCOUNTER
Left pt vm regarding referral to oral/ dental maxillofacial surgery. Pt has referral from Dr William Marcum, called Dr Jaqui Crystal office to schedule pt, they do not accept his insurance. Notified pt of this via vm, also stated he can find out which oral surgeon is in network for pt and I can call and schedule pt, or pt can call and schedule appt and I can fax referral to that office. Left our office number for pt call back.

## 2021-07-26 DIAGNOSIS — E78.5 DYSLIPIDEMIA: ICD-10-CM

## 2021-07-26 DIAGNOSIS — E11.8 TYPE 2 DIABETES MELLITUS WITH COMPLICATION, WITHOUT LONG-TERM CURRENT USE OF INSULIN (HCC): ICD-10-CM

## 2021-07-26 RX ORDER — ATORVASTATIN CALCIUM 80 MG/1
TABLET, FILM COATED ORAL
Qty: 90 TABLET | Refills: 0 | Status: SHIPPED
Start: 2021-07-26 | End: 2021-09-27 | Stop reason: SDUPTHER

## 2021-07-26 NOTE — TELEPHONE ENCOUNTER
Last Appointment:  3/29/21  Future Appointments   Date Time Provider Pratik Mabryi   8/4/2021 12:30 PM SCHEDULE, REMOTE CHECK FITO ELECTRO PHYS [facility]   8/25/2021 10:40 AM Krish Estrada MD [facility] NEURO [facility]   9/27/2021  9:00 AM Jolie Conroy MD [facility]   1/7/2022 10:00 AM DO Ronak Barberland ENT [facility]

## 2021-08-25 ENCOUNTER — VIRTUAL VISIT (OUTPATIENT)
Dept: NEUROLOGY | Age: 61
End: 2021-08-25
Payer: MEDICARE

## 2021-08-25 DIAGNOSIS — H54.62 VISION LOSS OF LEFT EYE: ICD-10-CM

## 2021-08-25 DIAGNOSIS — G44.229 CHRONIC TENSION-TYPE HEADACHE, NOT INTRACTABLE: Chronic | ICD-10-CM

## 2021-08-25 PROCEDURE — 99443 PR PHYS/QHP TELEPHONE EVALUATION 21-30 MIN: CPT | Performed by: PSYCHIATRY & NEUROLOGY

## 2021-08-25 RX ORDER — COVID-19 ANTIGEN TEST
KIT MISCELLANEOUS
COMMUNITY

## 2021-08-25 NOTE — PROGRESS NOTES
This 79-year-old right-handed man was referred for evaluation and management of longstanding headaches. He was deemed a good historian; his wife as well. This visit was conducted per telephone. His medications were now baclofen, spironolactone, fluticasone, montekulast, atorvastatin, metformin, famotidine, lisinopril, digoxin, inhalers, aspirin, furosemide and carvedilol. His medical history was remarkable for viral cardiomyopathy, with previous ejection fractions of only 20%, but now over 60%. He experienced intermittent atrial fibrillation, but only briefly and rarely. His cardiologist did not prescribe anticoagulants. There were also non-insulin-dependent diabetes mellitus, hypertension, hyperlipidemia, gastroesophageal reflux disease and chronic obstructive lung disease. He was not routinely checking his blood sugars. His most recent hemoglobin A1c from 7 months ago was 7.7. Past physicians questioned embolic strokes from his viral cardiomyopathy. MRIs were never obtained to confirm this diagnosis. CT scans of his head were unremarkable. I saw this individual 8 years ago, when I suspected tension type headaches. He was prescribed baclofen, but initially did not take that medication. He was now on 40 mg at night, previous responding well to that dosing. However, only recently he experienced severe headaches for the 1 week, now improved. I questioned sleep apnea contributing to his headaches. Sleep studies did not reveal obstructive sleep apnea. He was still sleeping erratically, now unable to maintain sleep. He would awaken with difficulties falling back to sleep. The patient saw another neurologist who suspected migraines. He was prescribed low-dose topiramate, without effect. He still experienced bilateral, daily, pressure sensations, without inciting or relieving events.   He again recalled brief, lightninglike sensations occurring on both sides of his head, as well as in his left periorbital region. He reported no other visual issues, bright lights, spinning sensations, speech difficulties, other pains or loss of consciousness. 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 He barely saw moving objects in the left eye. His right eye was intact. There was no significant return of vision. He denied other neurological events. He was stable medically, despite his comorbidities. He was eating well. He was practicing proper pandemic precautions. He had received the COVID-19 vaccination. Review of systems was otherwise unremarkable. He was afebrile and breathing comfortably. He was an elderly gentleman in no acute distress, who was alert, cooperative and oriented. He remained pleasant. His skin was unremarkable. He denied chest pains, palpitations or shortness of breath. His limbs displayed no abnormalities. Neurological examination produced an intact mental status. His wife observed no cranial nerve abnormalities. He moved all his limbs well. He appreciated light touch in all limbs. Coordination testing was unrevealing. He walked well. Laboratory data included a past unremarkable CMP, except for a GFR of 60. [facility] Hemoglobin A1c was 7.7. A cyanocobalamin level was 222. CBC with differential was unremarkable. A CT scan of his head last year was normal.    This individual's neurological examination is reportedly unrevealing. His headaches are primarily tension type spells, previously improved with higher dose baclofen. His worsening is likely related to sleep deprivation. I will first add melatonin 10 mg at night. If unsuccessful, I will use low-dose doxepin. He does not suffer from obstructive sleep apnea. 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 There was no return. He is stable medically despite his multiple comorbidities.   Fortunately, his viral cardiomyopathy has resolved. His blood sugars are still poorly controlled. He will return in 4 months to the office. Baclofen was maintained at 40 mg at night. Melatonin was started at 10 mg at night. He will report back in 1 month. He will see his family physician soon. He will call at any time if problems arise. I spent 30 minutes with the patient with over 50 % spent in counseling and disease management. All patient issues were addressed and all questions were answered. Matthias Vang was a 59-year-old individual who was evaluated via telephone on 8/25/2021. Consent:  He and/or health care decision maker is aware that that he may receive a bill for this telephone service, depending on his insurance coverage, and has provided verbal consent to proceed--- yes. Documentation:  I communicated with the patient and/or health care decision maker about tension type headaches. Details of this discussion including any medical advice provided per telephone. I affirmed this is a Patient Initiated Episode with an Established Patient who has not had a related appointment within my department in the past 7 days or scheduled within the next 24 hours. Again I spent 30 minutes with the patient.       Kinsey Beckford MD

## 2021-09-08 DIAGNOSIS — I42.0 DILATED CARDIOMYOPATHY (HCC): ICD-10-CM

## 2021-09-09 RX ORDER — LISINOPRIL 10 MG/1
10 TABLET ORAL DAILY
Qty: 30 TABLET | Refills: 0 | Status: SHIPPED
Start: 2021-09-09 | End: 2021-09-27 | Stop reason: SDUPTHER

## 2021-09-09 NOTE — TELEPHONE ENCOUNTER
Last Appointment:  3/2021  Future Appointments   Date Time Provider Pratik Mabryi   9/27/2021  9:00 AM Ry Harry MD [facility]   1/4/2022 11:00 AM Diana Priest MD [facility]   1/7/2022 10:00 AM Ran Ca, [address] [place] [address]

## 2021-09-09 NOTE — TELEPHONE ENCOUNTER
Refill approved- please advise patient to have labs completed prior to upcoming appt. Cr was stable in January but Bicarb was lower.

## 2021-09-16 ENCOUNTER — HOSPITAL ENCOUNTER (OUTPATIENT)
Age: 61
Discharge: HOME | End: 2021-09-16
Payer: MEDICARE

## 2021-09-16 DIAGNOSIS — E78.5 DYSLIPIDEMIA: ICD-10-CM

## 2021-09-16 DIAGNOSIS — E11.8 TYPE 2 DIABETES MELLITUS WITH COMPLICATION, WITHOUT LONG-TERM CURRENT USE OF INSULIN (HCC): ICD-10-CM

## 2021-09-16 DIAGNOSIS — I48.0 PAROXYSMAL ATRIAL FIBRILLATION (HCC): ICD-10-CM

## 2021-09-16 LAB
ALBUMIN SERPL-MCNC: 4.3 G/DL (ref 3.5–5.2)
ALP BLD-CCNC: 47 U/L (ref 40–129)
ALT SERPL-CCNC: 30 U/L (ref 0–40)
ANION GAP SERPL CALCULATED.3IONS-SCNC: 13 MMOL/L (ref 7–16)
AST SERPL-CCNC: 25 U/L (ref 0–39)
BASOPHILS ABSOLUTE: 0.04 E9/L (ref 0–0.2)
BASOPHILS RELATIVE PERCENT: 0.3 % (ref 0–2)
BILIRUB SERPL-MCNC: 0.6 MG/DL (ref 0–1.2)
BUN BLDV-MCNC: 14 MG/DL (ref 6–23)
CALCIUM SERPL-MCNC: 9.8 MG/DL (ref 8.6–10.2)
CHLORIDE BLD-SCNC: 103 MMOL/L (ref 98–107)
CHOLESTEROL, TOTAL: 124 MG/DL (ref 0–199)
CO2: 23 MMOL/L (ref 22–29)
CREAT SERPL-MCNC: 1 MG/DL (ref 0.7–1.2)
EOSINOPHILS ABSOLUTE: 0.17 E9/L (ref 0.05–0.5)
EOSINOPHILS RELATIVE PERCENT: 1.5 % (ref 0–6)
GFR AFRICAN AMERICAN: >60
GFR NON-AFRICAN AMERICAN: >60 ML/MIN/1.73
GLUCOSE BLD-MCNC: 170 MG/DL (ref 74–99)
HBA1C MFR BLD: 7.6 % (ref 4–5.6)
HCT VFR BLD CALC: 45.7 % (ref 37–54)
HDLC SERPL-MCNC: 35 MG/DL
HEMOGLOBIN: 15.6 G/DL (ref 12.5–16.5)
IMMATURE GRANULOCYTES #: 0.05 E9/L
IMMATURE GRANULOCYTES %: 0.4 % (ref 0–5)
LDL CHOLESTEROL CALCULATED: 64 MG/DL (ref 0–99)
LYMPHOCYTES ABSOLUTE: 3.44 E9/L (ref 1.5–4)
LYMPHOCYTES RELATIVE PERCENT: 30 % (ref 20–42)
MCH RBC QN AUTO: 31.9 PG (ref 26–35)
MCHC RBC AUTO-ENTMCNC: 34.1 % (ref 32–34.5)
MCV RBC AUTO: 93.5 FL (ref 80–99.9)
MONOCYTES ABSOLUTE: 1.06 E9/L (ref 0.1–0.95)
MONOCYTES RELATIVE PERCENT: 9.2 % (ref 2–12)
NEUTROPHILS ABSOLUTE: 6.7 E9/L (ref 1.8–7.3)
NEUTROPHILS RELATIVE PERCENT: 58.6 % (ref 43–80)
PDW BLD-RTO: 13.2 FL (ref 11.5–15)
PLATELET # BLD: 162 E9/L (ref 130–450)
PMV BLD AUTO: 11.6 FL (ref 7–12)
POTASSIUM SERPL-SCNC: 4.3 MMOL/L (ref 3.5–5)
RBC # BLD: 4.89 E12/L (ref 3.8–5.8)
SODIUM BLD-SCNC: 139 MMOL/L (ref 132–146)
TOTAL PROTEIN: 7 G/DL (ref 6.4–8.3)
TRIGL SERPL-MCNC: 124 MG/DL (ref 0–149)
VLDLC SERPL CALC-MCNC: 25 MG/DL
WBC # BLD: 11.5 E9/L (ref 4.5–11.5)

## 2021-09-16 PROCEDURE — 80061 LIPID PANEL: CPT

## 2021-09-16 PROCEDURE — 36415 COLL VENOUS BLD VENIPUNCTURE: CPT

## 2021-09-16 PROCEDURE — 85025 COMPLETE CBC W/AUTO DIFF WBC: CPT

## 2021-09-16 PROCEDURE — 83036 HEMOGLOBIN GLYCOSYLATED A1C: CPT

## 2021-09-16 PROCEDURE — 80053 COMPREHEN METABOLIC PANEL: CPT

## 2021-09-20 DIAGNOSIS — I42.0 DILATED CARDIOMYOPATHY (HCC): ICD-10-CM

## 2021-09-20 RX ORDER — FUROSEMIDE 20 MG/1
20 TABLET ORAL DAILY
Qty: 90 TABLET | Refills: 0 | Status: SHIPPED
Start: 2021-09-20 | End: 2021-09-27 | Stop reason: SDUPTHER

## 2021-09-20 NOTE — TELEPHONE ENCOUNTER
Last Appointment:  3/29/2021  Future Appointments   Date Time Provider Pratik Mabryi   9/27/2021  9:00 AM Johanna Ovalle MD [facility]   1/4/2022 11:00 AM Amber Dominguez MD [facility]   1/7/2022 10:00 AM Connie Zamarripa, [address] [place] [address] [facility]

## 2021-09-27 ENCOUNTER — OFFICE VISIT (OUTPATIENT)
Dept: INTERNAL MEDICINE | Age: 61
End: 2021-09-27
Payer: MEDICARE

## 2021-09-27 VITALS
HEIGHT: 76 IN | OXYGEN SATURATION: 97 % | WEIGHT: 270 LBS | HEART RATE: 82 BPM | BODY MASS INDEX: 32.88 KG/M2 | TEMPERATURE: 97.2 F | RESPIRATION RATE: 20 BRPM | DIASTOLIC BLOOD PRESSURE: 83 MMHG | SYSTOLIC BLOOD PRESSURE: 121 MMHG

## 2021-09-27 DIAGNOSIS — J40 BRONCHITIS: ICD-10-CM

## 2021-09-27 DIAGNOSIS — K21.9 GASTROESOPHAGEAL REFLUX DISEASE WITHOUT ESOPHAGITIS: ICD-10-CM

## 2021-09-27 DIAGNOSIS — Z96.651 HISTORY OF ARTHROPLASTY OF RIGHT KNEE: ICD-10-CM

## 2021-09-27 DIAGNOSIS — E78.5 DYSLIPIDEMIA: ICD-10-CM

## 2021-09-27 DIAGNOSIS — I42.0 DILATED CARDIOMYOPATHY (HCC): ICD-10-CM

## 2021-09-27 DIAGNOSIS — E11.8 TYPE 2 DIABETES MELLITUS WITH COMPLICATION, WITHOUT LONG-TERM CURRENT USE OF INSULIN (HCC): Primary | Chronic | ICD-10-CM

## 2021-09-27 PROCEDURE — 3017F COLORECTAL CA SCREEN DOC REV: CPT | Performed by: INTERNAL MEDICINE

## 2021-09-27 PROCEDURE — G8427 DOCREV CUR MEDS BY ELIG CLIN: HCPCS | Performed by: INTERNAL MEDICINE

## 2021-09-27 PROCEDURE — 2022F DILAT RTA XM EVC RTNOPTHY: CPT | Performed by: INTERNAL MEDICINE

## 2021-09-27 PROCEDURE — 4004F PT TOBACCO SCREEN RCVD TLK: CPT | Performed by: INTERNAL MEDICINE

## 2021-09-27 PROCEDURE — G8417 CALC BMI ABV UP PARAM F/U: HCPCS | Performed by: INTERNAL MEDICINE

## 2021-09-27 PROCEDURE — 3051F HG A1C>EQUAL 7.0%<8.0%: CPT | Performed by: INTERNAL MEDICINE

## 2021-09-27 PROCEDURE — 99214 OFFICE O/P EST MOD 30 MIN: CPT | Performed by: INTERNAL MEDICINE

## 2021-09-27 PROCEDURE — 99212 OFFICE O/P EST SF 10 MIN: CPT | Performed by: INTERNAL MEDICINE

## 2021-09-27 RX ORDER — CARVEDILOL 12.5 MG/1
12.5 TABLET ORAL 2 TIMES DAILY WITH MEALS
Qty: 90 TABLET | Refills: 3 | Status: SHIPPED
Start: 2021-09-27 | End: 2022-03-21 | Stop reason: SDUPTHER

## 2021-09-27 RX ORDER — SPIRONOLACTONE 25 MG/1
25 TABLET ORAL DAILY
Qty: 90 TABLET | Refills: 1 | Status: SHIPPED
Start: 2021-09-27 | End: 2022-03-21 | Stop reason: SDUPTHER

## 2021-09-27 RX ORDER — DIGOXIN 250 MCG
250 TABLET ORAL DAILY
Qty: 90 TABLET | Refills: 3 | Status: SHIPPED
Start: 2021-09-27 | End: 2022-02-28 | Stop reason: SDUPTHER

## 2021-09-27 RX ORDER — LISINOPRIL 10 MG/1
10 TABLET ORAL DAILY
Qty: 90 TABLET | Refills: 1 | Status: SHIPPED
Start: 2021-09-27 | End: 2022-03-21 | Stop reason: SDUPTHER

## 2021-09-27 RX ORDER — FAMOTIDINE 20 MG/1
20 TABLET, FILM COATED ORAL 2 TIMES DAILY
Qty: 180 TABLET | Refills: 1 | Status: SHIPPED
Start: 2021-09-27 | End: 2022-03-21 | Stop reason: SDUPTHER

## 2021-09-27 RX ORDER — FUROSEMIDE 20 MG/1
20 TABLET ORAL DAILY
Qty: 90 TABLET | Refills: 1 | Status: SHIPPED
Start: 2021-09-27 | End: 2022-03-21 | Stop reason: SDUPTHER

## 2021-09-27 RX ORDER — ALBUTEROL SULFATE 90 UG/1
2 AEROSOL, METERED RESPIRATORY (INHALATION) EVERY 6 HOURS PRN
Qty: 18 G | Refills: 5 | Status: SHIPPED
Start: 2021-09-27 | End: 2022-03-21 | Stop reason: SDUPTHER

## 2021-09-27 RX ORDER — ALBUTEROL SULFATE 2.5 MG/3ML
2.5 SOLUTION RESPIRATORY (INHALATION) EVERY 6 HOURS PRN
Qty: 120 EACH | Refills: 5 | Status: SHIPPED
Start: 2021-09-27 | End: 2022-01-31 | Stop reason: SDUPTHER

## 2021-09-27 RX ORDER — ATORVASTATIN CALCIUM 80 MG/1
80 TABLET, FILM COATED ORAL DAILY
Qty: 90 TABLET | Refills: 1 | Status: SHIPPED
Start: 2021-09-27 | End: 2022-03-21 | Stop reason: SDUPTHER

## 2021-09-27 SDOH — ECONOMIC STABILITY: FOOD INSECURITY: WITHIN THE PAST 12 MONTHS, YOU WORRIED THAT YOUR FOOD WOULD RUN OUT BEFORE YOU GOT MONEY TO BUY MORE.: NEVER TRUE

## 2021-09-27 SDOH — HEALTH STABILITY: PHYSICAL HEALTH: ON AVERAGE, HOW MANY MINUTES DO YOU ENGAGE IN EXERCISE AT THIS LEVEL?: 0 MIN

## 2021-09-27 SDOH — ECONOMIC STABILITY: TRANSPORTATION INSECURITY
IN THE PAST 12 MONTHS, HAS LACK OF TRANSPORTATION KEPT YOU FROM MEETINGS, WORK, OR FROM GETTING THINGS NEEDED FOR DAILY LIVING?: NO

## 2021-09-27 SDOH — ECONOMIC STABILITY: FOOD INSECURITY: WITHIN THE PAST 12 MONTHS, THE FOOD YOU BOUGHT JUST DIDN'T LAST AND YOU DIDN'T HAVE MONEY TO GET MORE.: NEVER TRUE

## 2021-09-27 SDOH — HEALTH STABILITY: PHYSICAL HEALTH: ON AVERAGE, HOW MANY DAYS PER WEEK DO YOU ENGAGE IN MODERATE TO STRENUOUS EXERCISE (LIKE A BRISK WALK)?: 0 DAYS

## 2021-09-27 SDOH — ECONOMIC STABILITY: TRANSPORTATION INSECURITY
IN THE PAST 12 MONTHS, HAS THE LACK OF TRANSPORTATION KEPT YOU FROM MEDICAL APPOINTMENTS OR FROM GETTING MEDICATIONS?: NO

## 2021-09-27 ASSESSMENT — SOCIAL DETERMINANTS OF HEALTH (SDOH)
HOW HARD IS IT FOR YOU TO PAY FOR THE VERY BASICS LIKE FOOD, HOUSING, MEDICAL CARE, AND HEATING?: NOT HARD AT ALL
WITHIN THE LAST YEAR, HAVE TO BEEN RAPED OR FORCED TO HAVE ANY KIND OF SEXUAL ACTIVITY BY YOUR PARTNER OR EX-PARTNER?: NO
WITHIN THE LAST YEAR, HAVE YOU BEEN AFRAID OF YOUR PARTNER OR EX-PARTNER?: NO
HOW OFTEN DO YOU ATTENT MEETINGS OF THE CLUB OR ORGANIZATION YOU BELONG TO?: NEVER
WITHIN THE LAST YEAR, HAVE YOU BEEN KICKED, HIT, SLAPPED, OR OTHERWISE PHYSICALLY HURT BY YOUR PARTNER OR EX-PARTNER?: NO
HOW OFTEN DO YOU GET TOGETHER WITH FRIENDS OR RELATIVES?: MORE THAN THREE TIMES A WEEK
IN A TYPICAL WEEK, HOW MANY TIMES DO YOU TALK ON THE PHONE WITH FAMILY, FRIENDS, OR NEIGHBORS?: MORE THAN THREE TIMES A WEEK
HOW OFTEN DO YOU ATTEND CHURCH OR RELIGIOUS SERVICES?: NEVER
DO YOU BELONG TO ANY CLUBS OR ORGANIZATIONS SUCH AS CHURCH GROUPS UNIONS, FRATERNAL OR ATHLETIC GROUPS, OR SCHOOL GROUPS?: NO
WITHIN THE LAST YEAR, HAVE YOU BEEN HUMILIATED OR EMOTIONALLY ABUSED IN OTHER WAYS BY YOUR PARTNER OR EX-PARTNER?: NO

## 2021-09-27 ASSESSMENT — PATIENT HEALTH QUESTIONNAIRE - PHQ9
2. FEELING DOWN, DEPRESSED OR HOPELESS: NOT AT ALL
DEPRESSION UNABLE TO ASSESS: YES
2. FEELING DOWN, DEPRESSED OR HOPELESS: 1
SUM OF ALL RESPONSES TO PHQ QUESTIONS 1-9: 1
SUM OF ALL RESPONSES TO PHQ9 QUESTIONS 1 & 2: 1
SUM OF ALL RESPONSES TO PHQ QUESTIONS 1-9: 1
SUM OF ALL RESPONSES TO PHQ QUESTIONS 1-9: 1
SUM OF ALL RESPONSES TO PHQ9 QUESTIONS 1 & 2: 1
1. LITTLE INTEREST OR PLEASURE IN DOING THINGS: 0
1. LITTLE INTEREST OR PLEASURE IN DOING THINGS: SEVERAL DAYS

## 2021-09-27 ASSESSMENT — LIFESTYLE VARIABLES
HOW MANY STANDARD DRINKS CONTAINING ALCOHOL DO YOU HAVE ON A TYPICAL DAY: 1 OR 2
HOW OFTEN DO YOU HAVE A DRINK CONTAINING ALCOHOL: 2-4 TIMES A MONTH

## 2021-09-27 NOTE — PROGRESS NOTES
Candie Marie (:  1960) is a 57 y.o. male,Established patient, here for evaluation of the following chief complaint(s):  Diabetes and Hypertension         ASSESSMENT/PLAN:  Dyslipidemia  · Previous ASCVD: 18.1%  · Lipid panel 2021, LDL 64    · on Atorvastatin 80mg QD   Hx of Non ischemic cardiomyopathy'    · S/P downgrading of dual chamber ICD to dual chamber pacemaker 17  · Last ECHO  EF 65% with Stage 1 diastolic dysfunction  · On ASA, spironolactone  25 mg daily, Lisinopril 10mg QD, Coreg 12.5 BID, Digoxin 250mcg QD, Lasix 20mg QD.   · Stress test 2020- EF 60%, no reversible perfusion defect   · Follows with Dr. Mayur Coughlin and Dr. Ciara Carvalho  Paroxysmal atrial flutter  · S/P AV kamar ablation 2013  · [id number] Score: 4  · on Coreg 12.5mg BID and Digoxin 250mcg QD   · no OAC per EP and cardiologist  · follows Dr Chau EP   TYPE 2 DM  · Hemoglobin A1C  7.7 2020 -> 7.6 2021   · Microalbumin crea ratio 2020 61.2   · on Metformin 1000mg BID + linagliptin 5 mg daily  · Have not been checking it range of 120s-130s    CKD, stage 3A, with micro albuminuria likely from DM nephropathy  · Improved  · Cr 1, GFR <60 2021    Seasonal allergies, Spring and fall,  stable   · PFT 3/27/2015 : FEV1 87% predicted , + post bronchodilator response 14% ? FEV1 volume change.  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 Sue Hilton   · On ASA and Statin  Chronic headache     · Sharp headaches 10/10 lasting fro about 30 seconds   · On  baclofen 20 mg   · Sleep study: negative    Gastroesophageal reflux disease, esophagitis presence not specified  · on famotidine 20 MG tablet; BID: takes it daily    · Last Off H2 intermittently   · Never had EGD    · We will get H. pylori antigens  OA  · follows orthopedics  · Repeat knee brace ordered    Smoking  · Smokes 1PPD since  years; currently 1/2PPD   · Pre contemplative at this time wants to stop it on new year      Return in about 6 months (around 3/27/2022). Subjective   SUBJECTIVE/OBJECTIVE:  HPI  Patient is a 76-year-old male with past medical history of TIA/CVA/CRAO, A. fib hypertension hyperlipidemia who presents to the clinic for routine follow-up. Patient has no new complaints at this time. Patient's labs and recent imaging were reviewed. Patient has no new questions at this point time. Review of Systems     Constitutional: (-) fever, (-) chills (-) weight gain or (-) weight loss. Head: (-) headache, (-) light headedness and (-) dizziness. Eyes: (-) changes in vision (-) blurry vision  Mouth: (-) difficulty swallowing. (-) pain with swallowing   Neck: (-) stiffness, (-) swelling and (-) pain. Respiratory: (-) SOB and (-) cough. Cardiovascular: (-) chest pain and (-) palpitations. GI:  (-) nausea, (-) vomiting, (-) diarrhea, (-) constipation and (-) abdomen pain. Urology: (-) pain with urination, (-) difficulty urinating, (-) urinary incontinence and (-) increased urination. Musculoskeletal: (-) muscle weakness (-) new joint pain. (-) rash   Hematology: (-) bruising (-) bleeding. Neurologic: (-) tingling, (-) numbness (-) focal neurological deficits. Objective   Physical Exam      Vitals: /83 (Site: Right Upper Arm, Position: Sitting, Cuff Size: Medium Adult)   Pulse 82   Temp 97.2 °F (36.2 °C) (Temporal)   Resp 20   Ht 6' 4\" (1.93 m)   Wt 270 lb (122.5 kg)   SpO2 97% Comment: room air  BMI 32.87 kg/m²       · Constitutional: Alert, AaO x3. Follows commands. In no apparent distress. · Head: Normocephalic and atraumatic. · Eyes: Conjunctiva normal, (-) scleral icterus. Mucus membranes moist.  · Mouth: Mucus membranes moist. Oropharynx clear. No deviation of the tongue or uvula. Normal dentition. · Respiratory: Lungs clear to auscultation bilaterally.  (-)  wheezes, (-)  rales, (-) rhonchi. · Cardiovascular: RRR. (-)  murmurs, (-) gallops,  (-) rubs. S1 and S2 were normal.   · GI:  Abdomen soft, non-tender, non-distended. (+) BS. (-)  rebound, (-) guarding, (-) rigidity. · Extremities: Warm and well perfused. (-) clubbing, (-) cyanosis. 2+ distal pulses. (-) peripheral edema. · Neurologic:  Cranial nerves II-XII grossly intact. No focal neurological deficits. On this date 9/27/2021 I have spent 20 minutes reviewing previous notes, test results and face to face with the patient discussing the diagnosis and importance of compliance with the treatment plan as well as documenting on the day of the visit. An electronic signature was used to authenticate this note.     --Merline Raddle, MD

## 2021-09-27 NOTE — PATIENT INSTRUCTIONS
· Follow-up in 6 months  · Please obtain blood work, stool sample and urine sample when able  · Knee brace has been ordered and will be called into [facility]  · Please obtain flu vaccination in late October

## 2021-09-27 NOTE — PROGRESS NOTES
Patient has not knowingly had unprotected exposire to anyone positive for COVID-10 within the last 14 days DENIES    AND does not have the following signs or symptoms:    A. One of the followin. Fever greater than 100.0 F NEGATIVE       2. Cough NEGATIVE       3. New onset shortness of breath NEGATIVE       4. New onset difficulty breathing NEGATIVE    AND/OR    B. Two or more of the following criteria:        1. Muscle aches NEGATIVE       2. Headache NEGATIVE       3. Sore Throat NEGATIVE       4. New onset loss of smell or taste NEGATIVE       5. New onset diarrhea NEGATIVE       6. Chills NEGATIVE       7. Runny nose NEGATIVE       8.  Sneezing NEGATIVE

## 2021-09-27 NOTE — PROGRESS NOTES
[facility]    Attending Physician Statement:  Frank Abrams M.D., F.A.C.P. I have discussed the case, including pertinent history and exam findings with the resident. I have seen and examined the patient and the key elements of the encounter have been performed by me. I agree with the assessment, plan and orders as documented by the resident. Patient is seen for fu visit today. Last office notes reviewed, relative labs and imaging. PAF- rate controlled +dig and coreg  Gastric reflux/GERD-- on H2RA now  Well controlled  Check Hpylori AB- next blood work    /83 (Site: Right Upper Arm, Position: Sitting, Cuff Size: Medium Adult)   Pulse 82   Temp 97.2 °F (36.2 °C) (Temporal)   Resp 20   Ht 6' 4\" (1.93 m)   Wt 270 lb (122.5 kg)   SpO2 97% Comment: room air  BMI 32.87 kg/m²     Knee OA -- +sp steriod injections in past  +lateral release  Complaints of \"buckeling\"-- but exam doesn't show instability/no laxity  - will represcibe knee brace (used to have one  Brace in past)    Left central retinal art occlusion- persistent central L visual loss, on ASA +statin  Remainder of medical problems as per resident note.

## 2021-12-07 RX ORDER — BACLOFEN 20 MG/1
TABLET ORAL
Qty: 60 TABLET | Refills: 11 | Status: SHIPPED | OUTPATIENT
Start: 2021-12-07

## 2021-12-29 ENCOUNTER — TELEPHONE (OUTPATIENT)
Dept: NON INVASIVE DIAGNOSTICS | Age: 61
End: 2021-12-29

## 2021-12-29 NOTE — TELEPHONE ENCOUNTER
Spoke with patient's wife. Asked her to have Zoë Guthrie send a remote pacemaker transmission.     Wilton Anthony RN, BSN  [facility]

## 2022-01-18 ENCOUNTER — OFFICE VISIT (OUTPATIENT)
Dept: NEUROLOGY | Age: 62
End: 2022-01-18
Payer: COMMERCIAL

## 2022-01-18 VITALS
WEIGHT: 270 LBS | RESPIRATION RATE: 20 BRPM | DIASTOLIC BLOOD PRESSURE: 89 MMHG | OXYGEN SATURATION: 96 % | HEIGHT: 76 IN | SYSTOLIC BLOOD PRESSURE: 139 MMHG | HEART RATE: 74 BPM | BODY MASS INDEX: 32.88 KG/M2 | TEMPERATURE: 97.6 F

## 2022-01-18 DIAGNOSIS — G44.221 CHRONIC TENSION-TYPE HEADACHE, INTRACTABLE: Primary | ICD-10-CM

## 2022-01-18 DIAGNOSIS — Z72.820 SLEEP DEPRIVATION: ICD-10-CM

## 2022-01-18 DIAGNOSIS — H54.62 VISION LOSS OF LEFT EYE: ICD-10-CM

## 2022-01-18 PROCEDURE — G8417 CALC BMI ABV UP PARAM F/U: HCPCS | Performed by: PSYCHIATRY & NEUROLOGY

## 2022-01-18 PROCEDURE — G8484 FLU IMMUNIZE NO ADMIN: HCPCS | Performed by: PSYCHIATRY & NEUROLOGY

## 2022-01-18 PROCEDURE — 4004F PT TOBACCO SCREEN RCVD TLK: CPT | Performed by: PSYCHIATRY & NEUROLOGY

## 2022-01-18 PROCEDURE — G8427 DOCREV CUR MEDS BY ELIG CLIN: HCPCS | Performed by: PSYCHIATRY & NEUROLOGY

## 2022-01-18 PROCEDURE — 99215 OFFICE O/P EST HI 40 MIN: CPT | Performed by: PSYCHIATRY & NEUROLOGY

## 2022-01-18 PROCEDURE — 3017F COLORECTAL CA SCREEN DOC REV: CPT | Performed by: PSYCHIATRY & NEUROLOGY

## 2022-01-18 RX ORDER — DOXEPIN HYDROCHLORIDE 25 MG/1
25 CAPSULE ORAL NIGHTLY
Qty: 30 CAPSULE | Refills: 5 | Status: SHIPPED
Start: 2022-01-18 | End: 2022-07-26 | Stop reason: SDUPTHER

## 2022-01-18 NOTE — PROGRESS NOTES
This 67-year-old right-handed man was referred for evaluation and management management of longstanding headaches. He was deemed a good historian; he was alone for this interview. His medications were multiple, now including spironolactone, fluticasone, montelukast, atorvastatin, metformin, famotidine, lisinopril, digoxin, linagliptin, inhalers, aspirin, furosemide and carvedilol. His medical history was remarkable for viral cardiomyopathy, with previous ejection fractions of only 20%. His ejection fraction was currently 50%. He previously suffered from intermittent atrial fibrillation. He reported non-insulin-dependent diabetes mellitus, hypertension, hyperlipidemia, gastroesophageal reflux disease and chronic obstructive lung disease. There were questionable embolic strokes from his viral cardiomyopathy. Past CT scans never confirmed this diagnosis. I initially saw this individual 7 years ago, when I suspected tension type headaches. He remained on baclofen 40 mg at night, with past moderate relief. He still noted an inability to maintain sleep. He requested additional medications. Sleep apnea was questioned but not proven by studies. He had seen another local neurologist who suspected migraines. He was prescribed low-dose topiramate, without effect. He again noted bilateral, daily, pressure sensations. There were no inciting or relieving events. .  He reported no other visual loss, bright lights, spinning sensations, speech difficulties, other pains or loss of consciousness. 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 Those difficulties had improved. His right eye remained intact. He denied other neurological events. He was eating well. He received his 2 COVID vaccines but refused the booster. He was practicing proper pandemic precautions. Review of systems was otherwise unremarkable.     Physical examination displayed stable vital signs, with a blood pressure 139/89. He was an elderly gentleman in no acute distress, who was alert, cooperative and oriented. He remained pleasant. His skin was unremarkable, with no lymphadenopathy. His neck was supple. His lungs were clear to auscultation. Cardiac testing proved unremarkable. His abdomen was also unrevealing. His limbs displayed no abnormalities. Neurological examination produced an intact mental status. Cranial nerve testing revealed no abnormalities. I found normal tone and bulk, with 5/5 strength throughout. There were no abnormal movements. Reflexes were +1 throughout, except absent at the ankles. There were no pathological reflexes. All sensory modalities were intact. Coordination testing was unrevealing. He walked well. Laboratory data included an unremarkable CMP, with a GFR of 60. Hemoglobin A1c was 7.6. CBC with differential was unremarkable. This individual's neurological examination is unrevealing. His headaches are primarily tension type spells, with questionable response to baclofen at night. There remains difficulties maintaining sleep. Therefore, I added doxepin 25 mg at night, to be increased as needed. 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 His blood sugars were questionably controlled. He is stable medically despite his multiple comorbidities. Fortunately, his viral cardiomyopathy had improved. He will return in 6 months to see my nurse practitioners. Baclofen was maintained at 40 mg at night. Doxepin was added 25 mg at night. He will report back in 4 to 5 weeks. He will call at any time if problems arise. I spent 40 minutes with the patient with over 50 % spent in counseling and disease management. All patient issues were addressed and all questions were answered.

## 2022-01-25 ENCOUNTER — OFFICE VISIT (OUTPATIENT)
Dept: ENT CLINIC | Age: 62
End: 2022-01-25
Payer: MEDICARE

## 2022-01-25 VITALS — BODY MASS INDEX: 31.66 KG/M2 | HEIGHT: 76 IN | WEIGHT: 260 LBS

## 2022-01-25 DIAGNOSIS — J30.1 ALLERGIC RHINITIS DUE TO POLLEN, UNSPECIFIED SEASONALITY: Primary | ICD-10-CM

## 2022-01-25 PROCEDURE — 3017F COLORECTAL CA SCREEN DOC REV: CPT | Performed by: OTOLARYNGOLOGY

## 2022-01-25 PROCEDURE — G8484 FLU IMMUNIZE NO ADMIN: HCPCS | Performed by: OTOLARYNGOLOGY

## 2022-01-25 PROCEDURE — 99213 OFFICE O/P EST LOW 20 MIN: CPT | Performed by: OTOLARYNGOLOGY

## 2022-01-25 PROCEDURE — G8427 DOCREV CUR MEDS BY ELIG CLIN: HCPCS | Performed by: OTOLARYNGOLOGY

## 2022-01-25 PROCEDURE — G8417 CALC BMI ABV UP PARAM F/U: HCPCS | Performed by: OTOLARYNGOLOGY

## 2022-01-25 PROCEDURE — 4004F PT TOBACCO SCREEN RCVD TLK: CPT | Performed by: OTOLARYNGOLOGY

## 2022-01-25 RX ORDER — FLUTICASONE PROPIONATE 50 MCG
2 SPRAY, SUSPENSION (ML) NASAL DAILY
Qty: 1 EACH | Refills: 0 | Status: SHIPPED
Start: 2022-01-25 | End: 2022-03-03

## 2022-01-25 RX ORDER — AZELASTINE 1 MG/ML
2 SPRAY, METERED NASAL 2 TIMES DAILY
Qty: 30 ML | Refills: 1 | Status: SHIPPED
Start: 2022-01-25 | End: 2022-03-30 | Stop reason: SDUPTHER

## 2022-01-25 NOTE — PROGRESS NOTES
[facility] Otolaryngology  Dr. aDle Emmanuel. Vickie Fernando. Ms.Ed        Patient Name:  Edda Hirsch  :  1960     CHIEF C/O:    Chief Complaint   Patient presents with    Follow-up     6mo f/u sinus       HISTORY OBTAINED FROM:  patient    HISTORY OF PRESENT ILLNESS:       Ciaran Todd is a 57y.o. year old male, here today for follow up of history of chronic allergic rhinitis and seasonal allergies with a known history of significant sinusitis is now much better controlled on chronic and persistent regimen of Flonase Astelin and Singulair. Patient tolerated medications well with no known side effects. No complaints epistaxis fever chills shortness of breath stridor or recent sinus infection. No complains of difficulty swallowing headaches or vision changes. Past Medical History:   Diagnosis Date    Arthritis     Atrial fibrillation ([address]     Bruising tendency (HCC)     Cardiomyopathy ([address]     probably viral, LV systolic dysfunction EF 49% 2011    Cerebral artery occlusion with cerebral infarction [facility])     TIA    CHF (congestive heart failure) ([address]     ef 20    Diabetes mellitus ([address]     Family history of colon cancer 2014    Headache(784.0)     Heart trouble ,     Hospitalized (WHAT TYPE?)    History of blood transfusion     History of frequent ear infections     History of substance abuse ([facility] 10/6/2011    A. Abstinence since  B. H/O significant alcohol consumption     Hx of blood clots     HEART    Hyperlipidemia     Hypertension     Memory loss     Recurrent infections , ,     ASK WHERE?     Speech problem     Vertebrobasilar TIAs 10/15/2015    2012      Past Surgical History:   Procedure Laterality Date    A-V CARDIAC PACEMAKER INSERTION Left 2017    Dual ICD removed Dual chamber Medtronic Pacer insertion by Dr Jose Cruz Walters  2013    aflutter ablation    CARDIAC DEFIBRILLATOR PLACEMENT  10/27/2011 Dr. Marlis Kawasaki, dual chamber  Medtronic    CARDIOVERSION  11-    Cardioversion   Dr. Noreen Mark    right    SINUS SURGERY      polyps removed     TONSILLECTOMY      TRANSESOPHAGEAL ECHOCARDIOGRAM  12/8/2011         VASECTOMY  1998       Current Outpatient Medications:     azelastine (ASTELIN) 0.1 % nasal spray, 2 sprays by Nasal route 2 times daily Use in each nostril as directed, Disp: 30 mL, Rfl: 1    fluticasone (FLONASE) 50 MCG/ACT nasal spray, 2 sprays by Nasal route daily 2 sprays each nostril once daily, Disp: 1 each, Rfl: 0    doxepin (SINEQUAN) 25 MG capsule, Take 1 capsule by mouth nightly, Disp: 30 capsule, Rfl: 5    baclofen (LIORESAL) 20 MG tablet, TAKE 1 TABLET BY MOUTH TWO TIMES A DAY, Disp: 60 tablet, Rfl: 11    digoxin (DIGOX) 250 MCG tablet, Take 1 tablet by mouth daily, Disp: 90 tablet, Rfl: 3    famotidine (PEPCID) 20 MG tablet, Take 1 tablet by mouth 2 times daily, Disp: 180 tablet, Rfl: 1    albuterol sulfate HFA (PROVENTIL HFA) 108 (90 Base) MCG/ACT inhaler, Inhale 2 puffs into the lungs every 6 hours as needed for Wheezing, Disp: 18 g, Rfl: 5    metFORMIN (GLUCOPHAGE) 1000 MG tablet, Take 1 tablet by mouth 2 times daily (with meals), Disp: 180 tablet, Rfl: 1    spironolactone (ALDACTONE) 25 MG tablet, Take 1 tablet by mouth daily, Disp: 90 tablet, Rfl: 1    carvedilol (COREG) 12.5 MG tablet, Take 1 tablet by mouth 2 times daily (with meals), Disp: 90 tablet, Rfl: 3    atorvastatin (LIPITOR) 80 MG tablet, Take 1 tablet by mouth daily take 1 tablet by mouth every evening, Disp: 90 tablet, Rfl: 1    linagliptin (TRADJENTA) 5 MG tablet, Take 1 tablet by mouth daily, Disp: 90 tablet, Rfl: 1    lisinopril (PRINIVIL;ZESTRIL) 10 MG tablet, Take 1 tablet by mouth daily, Disp: 90 tablet, Rfl: 1    furosemide (LASIX) 20 MG tablet, Take 1 tablet by mouth daily, Disp: 90 tablet, Rfl: 1    Naproxen Sodium 220 MG CAPS, Take by mouth, Disp: , Rfl:     montelukast (SINGULAIR) 10 MG tablet, Take 1 tablet by mouth daily, Disp: 30 tablet, Rfl: 3    azelastine (ASTELIN) 0.1 % nasal spray, 2 sprays by Nasal route daily Use in each nostril as directed, Disp: 1 Bottle, Rfl: 3    fluticasone (FLONASE) 50 MCG/ACT nasal spray, 2 sprays by Nasal route daily 2 sprays each nostril once a day, Disp: 1 Bottle, Rfl: 3    aspirin 81 MG tablet, Take 1 tablet by mouth daily, Disp: 30 tablet, Rfl: 5    albuterol (PROVENTIL) (2.5 MG/3ML) 0.083% nebulizer solution, Take 3 mLs by nebulization every 6 hours as needed for Wheezing, Disp: 120 each, Rfl: 3    Misc. Devices MISC, 1 each by Does not apply route once as needed for up to 1 dose. Nebulizer and tubing., Disp: 1 Device, Rfl: 0  Levaquin [levofloxacin in d5w], Penicillins, and Sulfa antibiotics  Social History     Tobacco Use    Smoking status: Current Every Day Smoker     Packs/day: 1.00     Years: 37.00     Pack years: 37.00     Types: Cigarettes    Smokeless tobacco: Never Used    Tobacco comment: currently 0.5 ppd, 9/17/20   Vaping Use    Vaping Use: Former    Start date: 5/1/2018   Becky Mohr Quit date: 6/1/2020    Substances: Always   Substance Use Topics    Alcohol use: Yes     Alcohol/week: 0.0 standard drinks     Comment: social    Drug use: Not Currently     Types: Marijuana (Willam Terrell), Cocaine, Opiates , Methamphetamines (Crystal Meth)     Comment: past use in his 19's; none since     Family History   Problem Relation Age of Onset    Cancer Mother         colon, mets to liver and lungs    No Known Problems Sister     High Blood Pressure Brother     High Cholesterol Brother        Review of Systems   Constitutional: Negative for chills and fever. HENT: Positive for congestion. Negative for ear discharge, hearing loss, sinus pressure, sneezing, sore throat, tinnitus and trouble swallowing. Respiratory: Negative for cough and shortness of breath.     Cardiovascular: Negative for chest pain and palpitations. Gastrointestinal: Negative for vomiting. Skin: Negative for rash. Allergic/Immunologic: Negative for environmental allergies. Neurological: Negative for dizziness and headaches. Hematological: Does not bruise/bleed easily. All other systems reviewed and are negative. Ht 6' 4\" (1.93 m)   Wt 260 lb (117.9 kg)   BMI 31.65 kg/m²   Physical Exam  Vitals and nursing note reviewed. Constitutional:       Appearance: He is well-developed. HENT:      Head: Normocephalic and atraumatic. Jaw: No trismus, tenderness or swelling. Right Ear: Tympanic membrane and ear canal normal.      Left Ear: Tympanic membrane and ear canal normal.      Nose: Nose normal. No congestion or rhinorrhea. Mouth/Throat:      Lips: No lesions. Mouth: Mucous membranes are dry. Palate: No mass and lesions. Pharynx: Oropharynx is clear. Eyes:      Pupils: Pupils are equal, round, and reactive to light. Neck:      Thyroid: No thyromegaly. Trachea: No tracheal deviation. Cardiovascular:      Rate and Rhythm: Normal rate. Pulmonary:      Effort: Pulmonary effort is normal. No respiratory distress. Musculoskeletal:         General: Normal range of motion. Cervical back: Normal range of motion. Lymphadenopathy:      Cervical: No cervical adenopathy. Skin:     General: Skin is warm. Findings: No erythema. Neurological:      Mental Status: He is alert. Cranial Nerves: No cranial nerve deficit. IMPRESSION/PLAN:  Patient seen exam for history of chronic left rhinitis congestion postnasal drainage well-controlled on current medical therapy including Flonase Astelin Singulair as well as saline rinses daily. Refills provided follow-up in 1 year sooner with any increased symptoms. Dr. Ro Phoenix.  Otolaryngology Facial Plastic Surgery  :  [facility] Otolaryngology/Facial Plastic Surgery Residency  Associate Clinical Professor:  Andreina Latham, [facility]

## 2022-01-31 DIAGNOSIS — J40 BRONCHITIS: ICD-10-CM

## 2022-01-31 RX ORDER — ALBUTEROL SULFATE 2.5 MG/3ML
2.5 SOLUTION RESPIRATORY (INHALATION) EVERY 6 HOURS PRN
Qty: 120 EACH | Refills: 3 | Status: SHIPPED
Start: 2022-01-31 | End: 2022-08-23 | Stop reason: SDUPTHER

## 2022-01-31 NOTE — TELEPHONE ENCOUNTER
Last Appointment:  9/27/21  Future Appointments   Date Time Provider Pratik Garcia   3/21/2022  9:00 AM Monica Gonzalez MD [facility]   7/26/2022  8:15 AM Xenia Richey [address] [place] [address]

## 2022-02-01 ASSESSMENT — ENCOUNTER SYMPTOMS
SORE THROAT: 0
COUGH: 0
SHORTNESS OF BREATH: 0
VOMITING: 0
TROUBLE SWALLOWING: 0
SINUS PRESSURE: 0

## 2022-02-28 DIAGNOSIS — I42.0 DILATED CARDIOMYOPATHY (HCC): ICD-10-CM

## 2022-02-28 RX ORDER — DIGOXIN 250 MCG
250 TABLET ORAL DAILY
Qty: 90 TABLET | Refills: 0 | Status: SHIPPED
Start: 2022-02-28 | End: 2022-06-15 | Stop reason: SDUPTHER

## 2022-03-03 ENCOUNTER — TELEPHONE (OUTPATIENT)
Dept: ENT CLINIC | Age: 62
End: 2022-03-03

## 2022-03-03 RX ORDER — FLUTICASONE PROPIONATE 50 MCG
SPRAY, SUSPENSION (ML) NASAL
Qty: 16 G | Refills: 5 | Status: SHIPPED
Start: 2022-03-03 | End: 2022-08-23 | Stop reason: SDUPTHER

## 2022-03-03 RX ORDER — FLUTICASONE PROPIONATE 50 MCG
SPRAY, SUSPENSION (ML) NASAL
Qty: 16 G | Refills: 5 | OUTPATIENT
Start: 2022-03-03

## 2022-03-07 RX ORDER — MONTELUKAST SODIUM 10 MG/1
10 TABLET ORAL DAILY
Qty: 30 TABLET | Refills: 3 | Status: SHIPPED
Start: 2022-03-07 | End: 2022-03-30 | Stop reason: SDUPTHER

## 2022-03-16 DIAGNOSIS — E11.8 TYPE 2 DIABETES MELLITUS WITH COMPLICATION, WITHOUT LONG-TERM CURRENT USE OF INSULIN (HCC): Chronic | ICD-10-CM

## 2022-03-16 LAB
CREATININE URINE: 105 MG/DL (ref 40–278)
MICROALBUMIN UR-MCNC: 57.7 MG/L
MICROALBUMIN/CREAT UR-RTO: 55 (ref 0–30)

## 2022-03-16 NOTE — PROGRESS NOTES
[facility] Internal Medicine      SUBJECTIVE:  Satya Henriquez (:  1960) is a 57 y.o. male here for evaluation of the following chief complaint(s): Routine follow up   Diabetes    Dyslipidemia  · Previous ASCVD: 18.1%  · Lipid panel 2021, LDL 64    · on Atorvastatin 80mg QD     Hx of Non ischemic cardiomyopathy'    · S/P downgrading of dual chamber ICD to dual chamber pacemaker 17  · Last ECHO  EF 65% with Stage 1 diastolic dysfunction  · On ASA, spironolactone  25 mg daily, Lisinopril 10mg QD, Coreg 12.5 BID, Digoxin 250mcg QD, Lasix 20mg QD.   · Stress test 2020- EF 60%, no reversible perfusion defect   · Follows with Dr. Clementina Keating    Paroxysmal atrial flutter  · S/P AV kamar ablation 2013  · [id number] Score: 4  · on Coreg 12.5mg BID and Digoxin 250mcg QD   · no OAC per EP and cardiologist  · follows Dr Kandi FELDER     TYPE 2 DM  · Hemoglobin A1C  7.7 2020 -> 7.6 2021 ->> 8.2 today. · Microalbumin crea ratio 2020 61.2   · on Metformin 1000mg BID + linagliptin 5 mg daily  ·   Patient does not check BG regularly at home. States he usually forget. No hypoglycemic event recently. Patient does not check eye/foot exam recently. Patient denies hypoglycemic symptoms, denies changes in vision/ no new tingling numbness or weakness. CKD, stage 3A, with micro albuminuria likely from DM nephropathy  · Improved  · Cr 1, GFR <60 2021     Hx of TIA/ CVA/ CRIAO  · With persistent left sided vision loss and retinal infarct  · follows Dr. Angel Nunez. Next appointment in December   · On ASA and Statin    Chronic headache     · Denies recent symptoms   · On  baclofen 20 mg   · Sleep study: negative      Gastroesophageal reflux disease, esophagitis presence not specified  · States symptoms occasionally. Takes Pepcid 20 mg 2times daily  · Never had EGD    · Patient just received the test kit. OA  · Follows orthopedics.  Patient states he never had knee daily 90 tablet 1    carvedilol (COREG) 12.5 MG tablet Take 1 tablet by mouth 2 times daily (with meals) 90 tablet 3    atorvastatin (LIPITOR) 80 MG tablet Take 1 tablet by mouth daily take 1 tablet by mouth every evening 90 tablet 1    linagliptin (TRADJENTA) 5 MG tablet Take 1 tablet by mouth daily 90 tablet 1    lisinopril (PRINIVIL;ZESTRIL) 10 MG tablet Take 1 tablet by mouth daily 90 tablet 1    furosemide (LASIX) 20 MG tablet Take 1 tablet by mouth daily 90 tablet 1    Naproxen Sodium 220 MG CAPS Take by mouth      montelukast (SINGULAIR) 10 MG tablet Take 1 tablet by mouth daily 30 tablet 3    azelastine (ASTELIN) 0.1 % nasal spray 2 sprays by Nasal route daily Use in each nostril as directed 1 Bottle 3    aspirin 81 MG tablet Take 1 tablet by mouth daily 30 tablet 5    Misc. Devices MISC 1 each by Does not apply route once as needed for up to 1 dose. Nebulizer and tubing. 1 Device 0     No current facility-administered medications on file prior to visit. OBJECTIVE:    VS:   Vitals:    03/21/22 0832   BP: 131/84   Site: Right Upper Arm   Position: Sitting   Cuff Size: Medium Adult   Pulse: 76   Resp: 20   Temp: 97.9 °F (36.6 °C)   TempSrc: Temporal   SpO2: 95%   Weight: 266 lb (120.7 kg)   Height: 6' 4\" (1.93 m)     Physical Exam  Constitutional:       General: He is not in acute distress. Appearance: He is well-developed. HENT:      Head: Normocephalic and atraumatic. Eyes:      General: No scleral icterus. Conjunctiva/sclera: Conjunctivae normal.   Neck:      Trachea: No tracheal deviation. Cardiovascular:      Rate and Rhythm: Normal rate. Heart sounds: No murmur heard. Pulmonary:      Effort: Pulmonary effort is normal. No respiratory distress. Breath sounds: Normal breath sounds. Abdominal:      General: Bowel sounds are normal. There is no distension. Palpations: Abdomen is soft. Tenderness: There is no abdominal tenderness.    Musculoskeletal: General: Normal range of motion. Cervical back: Normal range of motion. Skin:     General: Skin is warm and dry. Neurological:      Mental Status: He is alert and oriented to person, place, and time. Psychiatric:         Behavior: Behavior normal.         Thought Content: Thought content normal.         Judgment: Judgment normal.            ASSESSMENT/PLAN:  1. Personal history of tobacco use  -     MI VISIT TO DISCUSS LUNG CA SCREEN W LDCT  -     CT Lung Screen (Annual); Future    2. Dilated cardiomyopathy (HCC)  -     furosemide (LASIX) 20 MG tablet; Take 1 tablet by mouth daily, Disp-90 tablet, R-1Normal  -     lisinopril (PRINIVIL;ZESTRIL) 10 MG tablet; Take 1 tablet by mouth daily, Disp-90 tablet, R-1Normal  -     carvedilol (COREG) 12.5 MG tablet; Take 1 tablet by mouth 2 times daily (with meals), Disp-90 tablet, R-3Normal  -     spironolactone (ALDACTONE) 25 MG tablet; Take 1 tablet by mouth daily, Disp-90 tablet, R-1Normal    3. Type 2 diabetes mellitus with complication, without long-term current use of insulin ([facility]        -     Patient refuses to add more medication at this point. We will monior his BG and recheck A1c next visit. -     linagliptin (TRADJENTA) 5 MG tablet; Take 1 tablet by mouth daily, Disp-90 tablet, R-1Normal  -     metFORMIN (GLUCOPHAGE) 1000 MG tablet; Take 1 tablet by mouth 2 times daily (with meals), Disp-180 tablet, R-1Normal  -     POCT glycosylated hemoglobin (Hb A1C)  -     FRANC[address], [address], Jaziel Fu MD, Ophthalmology, [place] ([facility])    4. Dyslipidemia  -     atorvastatin (LIPITOR) 80 MG tablet; Take 1 tablet by mouth daily take 1 tablet by mouth every evening, Disp-90 tablet, R-1Normal    5. Bronchitis  -     albuterol sulfate HFA (PROVENTIL HFA) 108 (90 Base) MCG/ACT inhaler; Inhale 2 puffs into the lungs every 6 hours as needed for Wheezing, Disp-18 g, R-5Normal    6.  Gastroesophageal reflux disease without esophagitis  -     famotidine (PEPCID) 20 MG tablet; Take 1 tablet by mouth 2 times daily, Disp-180 tablet, R-1Normal    7. Primary osteoarthritis of right knee  -     DJO Hinged Knee Brace  -     Will remove Hx of Knee replacement surgery from EMR    8. Healthcare maintenance. -  CT lung cancer screening.  -  Annual Wellness visit in 3 months    RTC:  Return in about 3 months (around 6/21/2022) for Annual wellness visit. I have reviewed my findings and recommendations with Dada Pittman and Dr. Helga Kramer. Kiah Oleary MD   3/16/2022 3:14 PM      Low Dose CT (LDCT) Lung Screening criteria met:     Age 50-77(Medicare) or 50-80 (USPST)   Pack year smoking >20   Still smoking or less than 15 year since quit   No sign or symptoms of lung cancer   > 11 months since last LDCT     Risks and benefits of lung cancer screening with LDCT scans discussed:    Significance of positive screen - False-positive LDCT results often occur. 95% of all positive results do not lead to a diagnosis of cancer. Usually further imaging can resolve most false-positive results; however, some patients may require invasive procedures. Over diagnosis risk - 10% to 12% of screen-detected lung cancer cases are over diagnosedthat is, the cancer would not have been detected in the patient's lifetime without the screening. Need for follow up screens annually to continue lung cancer screening effectiveness     Risks associated with radiation from annual LDCT- Radiation exposure is about the same as for a mammogram, which is about 1/3 of the annual background radiation exposure from everyday life. Starting screening at age 54 is not likely to increase cancer risk from radiation exposure. Patients with comorbidities resulting in life expectancy of < 10 years, or that would preclude treatment of an abnormality identified on CT, should not be screened due to lack of benefit.     To obtain maximal benefit from this screening, smoking cessation and long-term abstinence from smoking is critical

## 2022-03-21 ENCOUNTER — OFFICE VISIT (OUTPATIENT)
Dept: INTERNAL MEDICINE | Age: 62
End: 2022-03-21
Payer: MEDICARE

## 2022-03-21 VITALS
SYSTOLIC BLOOD PRESSURE: 131 MMHG | BODY MASS INDEX: 32.39 KG/M2 | HEART RATE: 76 BPM | TEMPERATURE: 97.9 F | RESPIRATION RATE: 20 BRPM | WEIGHT: 266 LBS | HEIGHT: 76 IN | OXYGEN SATURATION: 95 % | DIASTOLIC BLOOD PRESSURE: 84 MMHG

## 2022-03-21 DIAGNOSIS — E11.8 TYPE 2 DIABETES MELLITUS WITH COMPLICATION, WITHOUT LONG-TERM CURRENT USE OF INSULIN (HCC): Chronic | ICD-10-CM

## 2022-03-21 DIAGNOSIS — K21.9 GASTROESOPHAGEAL REFLUX DISEASE WITHOUT ESOPHAGITIS: ICD-10-CM

## 2022-03-21 DIAGNOSIS — M17.11 PRIMARY OSTEOARTHRITIS OF RIGHT KNEE: ICD-10-CM

## 2022-03-21 DIAGNOSIS — Z87.891 PERSONAL HISTORY OF TOBACCO USE: Primary | ICD-10-CM

## 2022-03-21 DIAGNOSIS — I42.0 DILATED CARDIOMYOPATHY (HCC): ICD-10-CM

## 2022-03-21 DIAGNOSIS — I42.8 CARDIOMYOPATHY, NONISCHEMIC (HCC): Chronic | ICD-10-CM

## 2022-03-21 DIAGNOSIS — E78.5 DYSLIPIDEMIA: ICD-10-CM

## 2022-03-21 DIAGNOSIS — J40 BRONCHITIS: ICD-10-CM

## 2022-03-21 LAB — HBA1C MFR BLD: 8.2 %

## 2022-03-21 PROCEDURE — 99214 OFFICE O/P EST MOD 30 MIN: CPT | Performed by: INTERNAL MEDICINE

## 2022-03-21 PROCEDURE — 3052F HG A1C>EQUAL 8.0%<EQUAL 9.0%: CPT | Performed by: INTERNAL MEDICINE

## 2022-03-21 PROCEDURE — G0296 VISIT TO DETERM LDCT ELIG: HCPCS | Performed by: INTERNAL MEDICINE

## 2022-03-21 PROCEDURE — 99213 OFFICE O/P EST LOW 20 MIN: CPT | Performed by: INTERNAL MEDICINE

## 2022-03-21 PROCEDURE — 83036 HEMOGLOBIN GLYCOSYLATED A1C: CPT | Performed by: INTERNAL MEDICINE

## 2022-03-21 RX ORDER — FUROSEMIDE 20 MG/1
20 TABLET ORAL DAILY
Qty: 90 TABLET | Refills: 1 | Status: SHIPPED
Start: 2022-03-21 | End: 2022-07-26 | Stop reason: SDUPTHER

## 2022-03-21 RX ORDER — LISINOPRIL 10 MG/1
10 TABLET ORAL DAILY
Qty: 90 TABLET | Refills: 1 | Status: SHIPPED
Start: 2022-03-21 | End: 2022-08-23 | Stop reason: SDUPTHER

## 2022-03-21 RX ORDER — ATORVASTATIN CALCIUM 80 MG/1
80 TABLET, FILM COATED ORAL DAILY
Qty: 90 TABLET | Refills: 1 | Status: SHIPPED
Start: 2022-03-21 | End: 2022-08-23 | Stop reason: SDUPTHER

## 2022-03-21 RX ORDER — SPIRONOLACTONE 25 MG/1
25 TABLET ORAL DAILY
Qty: 90 TABLET | Refills: 1 | Status: SHIPPED
Start: 2022-03-21 | End: 2022-08-23 | Stop reason: SDUPTHER

## 2022-03-21 RX ORDER — ALBUTEROL SULFATE 90 UG/1
2 AEROSOL, METERED RESPIRATORY (INHALATION) EVERY 6 HOURS PRN
Qty: 18 G | Refills: 5 | Status: SHIPPED | OUTPATIENT
Start: 2022-03-21

## 2022-03-21 RX ORDER — FAMOTIDINE 20 MG/1
20 TABLET, FILM COATED ORAL 2 TIMES DAILY
Qty: 180 TABLET | Refills: 1 | Status: SHIPPED
Start: 2022-03-21 | End: 2022-08-23 | Stop reason: SDUPTHER

## 2022-03-21 RX ORDER — CARVEDILOL 12.5 MG/1
12.5 TABLET ORAL 2 TIMES DAILY WITH MEALS
Qty: 90 TABLET | Refills: 3 | Status: SHIPPED | OUTPATIENT
Start: 2022-03-21 | End: 2022-09-17

## 2022-03-21 ASSESSMENT — ENCOUNTER SYMPTOMS
WHEEZING: 0
COUGH: 0
CHOKING: 0
ABDOMINAL PAIN: 0
SHORTNESS OF BREATH: 0
SORE THROAT: 0
APNEA: 0
BACK PAIN: 1

## 2022-03-21 NOTE — PROGRESS NOTES
[facility]  [facility]    Attending Physician Statement  I have discussed the case, including pertinent history and exam findings with the resident physician. I have seen the patient and the key elements of the encounter have been performed by me. I agree with the assessment, plan and orders as documented by the resident. I have reviewed all pertinent PMHx, PSHx, FamHx, SocialHx, medications, and allergies and updated history as appropriate. Patient presents for routine follow up of medical problems. NICM - follows with EP and cardio. Well compensated. Has cardio follow-up on 3/30/2022. Continue aspirin, atorvastatin, carvedilol, digoxin, and furosemide. A. Flutter - in sinus rhythm. Follows with EP. Continue present management. DM - HbA1c - 7.6 at last check. Today, this is at 8.2. Mr. Tyler Stanley states that he has been nonadherent to his exercise and diabetic diet. He states that he does not want to add additional medications. He stated that he would have his hemoglobin A1c improved at his next visit. Recheck hemoglobin A1c at next visit. If any further increase in hemoglobin A1c, will add SGLT2 inhibitor. HTN - controlled   Continue current medications as above. Right knee pain likely secondary to OA - requesting knee brace for this. Patient states did not have knee arthroplasty but this is reflected in the record. I have removed this from his history. Order knee brace    Remainder of medical problems as per resident note.     Alpa Eric MD  3/21/2022 9:42 AM

## 2022-03-21 NOTE — PATIENT INSTRUCTIONS
What is lung cancer screening? Lung cancer screening is a way in which doctors check the lungs for early signs of cancer in people who have no symptoms of lung cancer. A low-dose CT scan uses much less radiation than a normal CT scan and shows a more detailed image of the lungs than a standard X-ray. The goal of lung cancer screening is to find cancer early, before it has a chance to grow, spread, or cause problems. One large study found that smokers who were screened with low-dose CT scans were less likely to die of lung cancer than those who were screened with standard X-ray. Below is a summary of the things you need to know regarding screening for lung cancer with low-dose computed tomography (LDCT). This is a screening program that involves routine annual screening with LDCT studies of the lung. The LDCTs are done using low-dose radiation that is not thought to increase your cancer risk. If you have other serious medical conditions (other cancers, congestive heart failure) that limit your life expectancy to less than 10 years, you should not undergo lung cancer screening with LDCT. The chance is 20%-60% that the LDCT result will show abnormalities. This would require additional testing which could include repeat imaging or even invasive procedures. Most (about 95%) of \"abnormal\" LDCT results are false in the sense that no lung cancer is ultimately found. Additionally, some (about 10%) of the cancers found would not affect your life expectancy, even if undetected and untreated. If you are still smoking, the single most important thing that you can do to reduce your risk of dying of lung cancer is to quit. For this screening to be covered by Medicare and most other insurers, strict criteria must be met. If you do not meet these criteria, but still wish to undergo LDCT testing, you will be required to sign a waiver indicating your willingness to pay for the scan.     Dear Rosalio Montelongo Diann Keithone you for coming to your appointment today. I hope we have addressed all of your needs. Please make sure to do the following:  - Start taking Farxiga 5 mg daily. Check your blood sugar 3 times daily in the morning, 2 hours after lunch and dinner.   - Referrals have been made to foot doctor and eye doctor: If you do not hear from the office in 1 week, please call the number listed. - We will see each other again in 1 month for annual wellness visit    Have a great day!         Sincerely,  Vicente Burris M.D PGY-2  3/21/2022  9:55 AM

## 2022-03-21 NOTE — PROGRESS NOTES
Patient has not knowingly had unprotected exposire to anyone positive for COVID-10 within the last 14 days DENIES    AND does not have the following signs or symptoms:    A. One of the followin. Fever greater than 100.0 F NEGATIVE       2. Cough NEGATIVE       3. New onset shortness of breath NEGATIVE       4. New onset difficulty breathing NEGATIVE    AND/OR    B. Two or more of the following criteria:        1. Muscle aches NEGATIVE       2. Headache NEGATIVE       3. Sore Throat NEGATIVE       4. New onset loss of smell or taste NEGATIVE       5. New onset diarrhea NEGATIVE       6. Chills NEGATIVE       7. Runny nose NEGATIVE       8. Sneezing NEGATIVE  Polina Mena LPN       Patient given instruction by Dr. Araceli Triana. One month AWVfollow up scheduled. Printed AVS given to patient.   Polina Mena LPN

## 2022-03-22 ENCOUNTER — TELEPHONE (OUTPATIENT)
Dept: INTERNAL MEDICINE | Age: 62
End: 2022-03-22

## 2022-03-23 ENCOUNTER — TELEPHONE (OUTPATIENT)
Dept: INTERNAL MEDICINE | Age: 62
End: 2022-03-23

## 2022-03-23 DIAGNOSIS — E11.8 TYPE 2 DIABETES MELLITUS WITH COMPLICATION, WITHOUT LONG-TERM CURRENT USE OF INSULIN (HCC): Primary | ICD-10-CM

## 2022-03-23 NOTE — TELEPHONE ENCOUNTER
Call received from [facility]. They are denying the referral for the following reasons: The are not taking referrals at this time as there is a provider out ill    Please cancel referral in computer and reorder to either another qualifying provider or to the following indicated provider:    [address] [place] [address]    Please advise when complete so I may fax this over to new referral provider. Thank you.   Harpal Otero LPN

## 2022-03-25 ENCOUNTER — TELEPHONE (OUTPATIENT)
Dept: INTERNAL MEDICINE | Age: 62
End: 2022-03-25

## 2022-03-25 DIAGNOSIS — H54.62 VISION LOSS OF LEFT EYE: Primary | ICD-10-CM

## 2022-03-25 DIAGNOSIS — E11.8 TYPE 2 DIABETES MELLITUS WITH COMPLICATION, WITHOUT LONG-TERM CURRENT USE OF INSULIN (HCC): ICD-10-CM

## 2022-03-30 ENCOUNTER — OFFICE VISIT (OUTPATIENT)
Dept: CARDIOLOGY CLINIC | Age: 62
End: 2022-03-30
Payer: MEDICARE

## 2022-03-30 VITALS
BODY MASS INDEX: 32.44 KG/M2 | HEIGHT: 76 IN | WEIGHT: 266.4 LBS | OXYGEN SATURATION: 98 % | HEART RATE: 73 BPM | SYSTOLIC BLOOD PRESSURE: 152 MMHG | RESPIRATION RATE: 16 BRPM | DIASTOLIC BLOOD PRESSURE: 90 MMHG

## 2022-03-30 DIAGNOSIS — I42.8 CARDIOMYOPATHY, NONISCHEMIC (HCC): Primary | ICD-10-CM

## 2022-03-30 PROCEDURE — 99214 OFFICE O/P EST MOD 30 MIN: CPT | Performed by: INTERNAL MEDICINE

## 2022-03-30 PROCEDURE — 93000 ELECTROCARDIOGRAM COMPLETE: CPT | Performed by: INTERNAL MEDICINE

## 2022-03-30 ASSESSMENT — ENCOUNTER SYMPTOMS
COUGH: 0
BACK PAIN: 0
VOMITING: 0
DIARRHEA: 0
NAUSEA: 0
BLOOD IN STOOL: 0
ABDOMINAL PAIN: 0
WHEEZING: 0
SHORTNESS OF BREATH: 0
CONSTIPATION: 0

## 2022-03-30 NOTE — TELEPHONE ENCOUNTER
[facility].     Electronically signed by Rosa Isela Pina DO on 3/30/2022 at 1:32 PM

## 2022-03-30 NOTE — PROGRESS NOTES
OUTPATIENT CARDIOLOGY FOLLOW-UP    HPI:    Name: Yola Benavides    Age: 57 y.o. Primary Care Physician: Kiko Stephens MD    Date of Service: 3/30/2022    Chief Complaint:   Chief Complaint   Patient presents with    Cardiomyopathy     1.5 yr OV        History of present illness :  71-year-old morbidly obese chronic smoker male who comes today for a follow-up visit. He was seen on 9/17/2020. He has history of nonischemic cardiomyopathy, paroxysmal atrial flutter, left atrial appendage clot, status post ablation by Dr. Marlis Kawasaki, status post AICD which was exchanged to a pacemaker in April 2017, status post CVA, diabetes, hypertension, hyperlipidemia, and history of ex-alcohol abuse. Patient continues to smoke. He is fairly active. He recently started walking approximately an hour with no limitations. He can go up 2 flight of stairs with no chest discomfort or dyspnea on exertion. He sometimes feels twinges in his chest.  He denies palpitations, dizziness or syncope. He denies lower extremity edema. He did not take his medication this morning and he had his coffee. EKG done today revealed sinus rhythm with left atrial enlargement versus ectopic atrial rhythm, incomplete right bundle branch block, left ventricular hypertrophy with nonspecific ST-T wave changes. Review of Systems:   Review of Systems   Constitutional: Negative for chills, fatigue and fever. Insomnia   HENT: Negative for nosebleeds. Respiratory: Negative for cough, shortness of breath and wheezing. Gastrointestinal: Negative for abdominal pain, blood in stool, constipation, diarrhea, nausea and vomiting. Genitourinary: Negative for dysuria and hematuria. Musculoskeletal: Negative for back pain, joint swelling and myalgias. Aching. Neurological: Negative for syncope and light-headedness. Psychiatric/Behavioral: The patient is not nervous/anxious.            Past Medical History:  Past Medical History: Diagnosis Date    Arthritis     Atrial fibrillation ([facility]     Bruising tendency (HCC)     Cardiomyopathy ([facility]     probably viral, LV systolic dysfunction EF 27% July 2011    Cerebral artery occlusion with cerebral infarction [facility])     TIA    CHF (congestive heart failure) ([facility]     ef 20    Diabetes mellitus ([facility]     Family history of colon cancer 7/21/2014    Headache(784.0)     Heart trouble July, 2011, February, 2012    Hospitalized (WHAT TYPE?)    History of blood transfusion     History of frequent ear infections     History of substance abuse ([facility] 10/6/2011    A. Abstinence since 1980's B. H/O significant alcohol consumption     Hx of blood clots     HEART    Hyperlipidemia     Hypertension     Memory loss     Recurrent infections 1985, 1995, 2005    ASK WHERE?     Speech problem     Vertebrobasilar TIAs 10/15/2015    2012        Past Surgical History:  Past Surgical History:   Procedure Laterality Date    A-V CARDIAC PACEMAKER INSERTION Left 04/28/2017    Dual ICD removed Dual chamber Medtronic Pacer insertion by Dr Elmira Florentino DYSRHYTHMIC FOCUS  12/13/2013    aflutter ablation    CARDIAC DEFIBRILLATOR PLACEMENT  10/27/2011    Dr. Sunita Cha, dual chamber  Medtronic    CARDIOVERSION  11/18/2013    Cardioversion   Dr. Bingham Kaitlynn      polyps removed     TONSILLECTOMY      TRANSESOPHAGEAL ECHOCARDIOGRAM  12/08/2011         VASECTOMY  1998       Family History:  Family History   Problem Relation Age of Onset    Cancer Mother         colon, mets to liver and lungs    No Known Problems Sister     High Blood Pressure Brother     High Cholesterol Brother        Social History:  Social History     Socioeconomic History    Marital status:      Spouse name: Not on file    Number of children: Not on file    Years of education: Not on file    Highest education level: Not on file   Occupational History    Not on file   Tobacco Use    Smoking status: Current Every Day Smoker     Packs/day: 1.00     Years: 37.00     Pack years: 37.00     Types: Cigarettes    Smokeless tobacco: Never Used    Tobacco comment: currently 0.5 ppd, 9/17/20   Vaping Use    Vaping Use: Former    Start date: 5/1/2018   Christina Mejia Quit date: 6/1/2020    Substances: Always   Substance and Sexual Activity    Alcohol use: Yes     Alcohol/week: 0.0 standard drinks     Comment: social    Drug use: Not Currently     Types: Marijuana (Jannie Crumbly), Cocaine, Opiates , Methamphetamines (Crystal Meth)     Comment: past use in his 19's; none since    Sexual activity: Not on file   Other Topics Concern    Not on file   Social History Narrative    Not on file     Social Determinants of Health     Financial Resource Strain: Low Risk     Difficulty of Paying Living Expenses: Not hard at all   Food Insecurity: No Food Insecurity    Worried About [address] in the Last Year: Never true    [address] in the Last Year: Never true   Transportation Needs: No Transportation Needs    Lack of Transportation (Medical): No    Lack of Transportation (Non-Medical): No   Physical Activity: Inactive    Days of Exercise per Week: 0 days    Minutes of Exercise per Session: 0 min   Stress: No Stress Concern Present    Feeling of Stress : Not at all   Social Connections:  Moderately Isolated    Frequency of Communication with Friends and Family: More than three times a week    Frequency of Social Gatherings with Friends and Family: More than three times a week    Attends Faith Services: Never    Active Member of Clubs or Organizations: No    Attends Club or Organization Meetings: Never    Marital Status:    Intimate Partner Violence: Not At Risk    Fear of Current or Ex-Partner: No    Emotionally Abused: No    Physically Abused: No    Sexually Abused: No   Housing Stability:     Unable to Pay for Housing in the Last Year: Not on file    [facility] in [facility]: Not on file    Unstable Housing in the Last Year: Not on file       Allergies:   Allergies   Allergen Reactions    Levaquin [Levofloxacin In D5w]     Penicillins Anaphylaxis    Sulfa Antibiotics Anaphylaxis       Current Medications:  Current Outpatient Medications   Medication Sig Dispense Refill    furosemide (LASIX) 20 MG tablet Take 1 tablet by mouth daily 90 tablet 1    lisinopril (PRINIVIL;ZESTRIL) 10 MG tablet Take 1 tablet by mouth daily 90 tablet 1    linagliptin (TRADJENTA) 5 MG tablet Take 1 tablet by mouth daily 90 tablet 1    atorvastatin (LIPITOR) 80 MG tablet Take 1 tablet by mouth daily take 1 tablet by mouth every evening 90 tablet 1    carvedilol (COREG) 12.5 MG tablet Take 1 tablet by mouth 2 times daily (with meals) 90 tablet 3    spironolactone (ALDACTONE) 25 MG tablet Take 1 tablet by mouth daily 90 tablet 1    metFORMIN (GLUCOPHAGE) 1000 MG tablet Take 1 tablet by mouth 2 times daily (with meals) 180 tablet 1    albuterol sulfate HFA (PROVENTIL HFA) 108 (90 Base) MCG/ACT inhaler Inhale 2 puffs into the lungs every 6 hours as needed for Wheezing 18 g 5    famotidine (PEPCID) 20 MG tablet Take 1 tablet by mouth 2 times daily 180 tablet 1    montelukast (SINGULAIR) 10 MG tablet Take 1 tablet by mouth daily 30 tablet 3    fluticasone (FLONASE) 50 MCG/ACT nasal spray INHALE TWO SPRAYS INTO EACH NOSTRIL ONCE DAILY 16 g 5    digoxin (DIGOX) 250 MCG tablet Take 1 tablet by mouth daily 90 tablet 0    albuterol (PROVENTIL) (2.5 MG/3ML) 0.083% nebulizer solution Take 3 mLs by nebulization every 6 hours as needed for Wheezing 120 each 3    azelastine (ASTELIN) 0.1 % nasal spray 2 sprays by Nasal route 2 times daily Use in each nostril as directed 30 mL 1    doxepin (SINEQUAN) 25 MG capsule Take 1 capsule by mouth nightly 30 capsule 5    baclofen (LIORESAL) 20 MG tablet TAKE 1 TABLET BY MOUTH TWO TIMES A DAY 60 tablet 11    Naproxen Sodium 220 MG CAPS Take by mouth      montelukast (SINGULAIR) 10 MG tablet Take 1 tablet by mouth daily 30 tablet 3    azelastine (ASTELIN) 0.1 % nasal spray 2 sprays by Nasal route daily Use in each nostril as directed 1 Bottle 3    aspirin 81 MG tablet Take 1 tablet by mouth daily 30 tablet 5    Misc. Devices MISC 1 each by Does not apply route once as needed for up to 1 dose. Nebulizer and tubing. 1 Device 0     No current facility-administered medications for this visit. Physical Exam:  Ht 6' 4\" (1.93 m)   BMI 32.38 kg/m²   Wt Readings from Last 3 Encounters:   03/21/22 266 lb (120.7 kg)   01/25/22 260 lb (117.9 kg)   01/18/22 270 lb (122.5 kg)       Physical Exam  Constitutional:       General: He is not in acute distress. Appearance: He is well-developed. He is obese. HENT:      Head: Normocephalic and atraumatic. Neck:      Vascular: No carotid bruit or JVD. Cardiovascular:      Rate and Rhythm: Normal rate and regular rhythm. Heart sounds: No murmur heard. No friction rub. No gallop. Pulmonary:      Breath sounds: No wheezing or rales. Comments: Decreased air entry bilaterally with no wheezing or crackles. Chest:      Chest wall: No tenderness. Abdominal:      General: Bowel sounds are normal. There is no distension. Palpations: Abdomen is soft. There is no mass. Tenderness: There is no abdominal tenderness. Comments: No abdominal bruit. Musculoskeletal:      Cervical back: Neck supple. Right lower leg: No edema. Left lower leg: No edema. Comments: Small varicose veins noted. Skin:     General: Skin is warm and dry. Neurological:      Mental Status: He is alert and oriented to person, place, and time.            Laboratory Tests:  Lab Results   Component Value Date    CREATININE 1.0 09/16/2021    BUN 14 09/16/2021     09/16/2021    K 4.3 09/16/2021     09/16/2021    CO2 23 09/16/2021     Lab Results   Component Value Date    MG 2.0 03/03/2012     Lab Results   Component Value Date    WBC 11.5 09/16/2021    HGB 15.6 09/16/2021    HCT 45.7 09/16/2021    MCV 93.5 09/16/2021     09/16/2021     Lab Results   Component Value Date    ALT 30 09/16/2021    AST 25 09/16/2021    ALKPHOS 47 09/16/2021    BILITOT 0.6 09/16/2021     Lab Results   Component Value Date    CKTOTAL 993 (H) 02/19/2015    CKMB 5.2 02/18/2015    TROPONINI <0.01 02/18/2015    TROPONINI <0.01 02/18/2015    TROPONINI <0.01 02/17/2015     Lab Results   Component Value Date    INR 1.2 02/17/2015    INR 2.3 04/28/2014    INR 2.9 04/14/2014    PROTIME 12.1 (H) 02/17/2015    PROTIME 27.1 04/28/2014    PROTIME 35.0 04/14/2014     Lab Results   Component Value Date    TSH 1.770 06/02/2020     Lab Results   Component Value Date    LABA1C 8.2 (H) 03/21/2022       Lab Results   Component Value Date    CHOL 124 09/16/2021    CHOL 142 05/01/2020    CHOL 140 07/31/2019     Lab Results   Component Value Date    TRIG 124 09/16/2021    TRIG 108 05/01/2020    TRIG 105 07/31/2019     Lab Results   Component Value Date    HDL 35 09/16/2021    HDL 39 05/01/2020    HDL 34 07/31/2019     Lab Results   Component Value Date    LDLCALC 64 09/16/2021    LDLCALC 81 05/01/2020    LDLCALC 85 07/31/2019     Lab Results   Component Value Date    LABVLDL 25 09/16/2021    LABVLDL 22 05/01/2020    LABVLDL 21 07/31/2019       Cardiac Tests:    Echocardiogram: Done on 7/17/2019 revealed mild tricuspid regurgitation, grade 1 diastolic dysfunction with an ejection fraction of 65%.     Lexiscan done on 9/28/2020:  -Large fixed inferior defect probably due to increased bowel uptake and or diaphragmatic attenuation artifact.  -Absence of reversible defects.  -Absence of segmental wall motion abnormalities.  -Ejection fraction 60%. ASSESSMENT / PLAN:  -Nonischemic cardiomyopathy that has resolved: It was probably alcohol or tachycardia induced. His ejection fraction is back to normal.  -Paroxysmal atrial flutter, status post ablation.   Currently in sinus rhythm or ectopic atrial rhythm. He has not been on anticoagulation.  -Hypertension: Mildly elevated this morning but patient did not take his medication and had his coffee. -Hyperlipidemia: On high-dose Lipitor.  -Diabetes. -Status post AICD. -Status post CVA. -Morbid obesity with probable obstructive sleep apnea. We will continue current cardiac medications. Patient was advised to take his blood pressure twice a day for 3 days. His blood pressure goal is below 130/80. Consider increasing his lisinopril to twice daily if blood pressure is not at goal.  Patient was advised to quit smoking and to lose weight. Patient will be referred to EP for follow-up. He used to be seen by Dr. Angella Patton. Questionable  further need of his digoxin. We will follow-up at the office in 1 year. The patient's current medication list, allergies, problem list and results of all previously ordered testing were reviewed at today's visit. {  India Hernandez MD , [facility]   [facility]) Cardiology

## 2022-04-15 DIAGNOSIS — K21.9 GASTROESOPHAGEAL REFLUX DISEASE WITHOUT ESOPHAGITIS: ICD-10-CM

## 2022-04-18 ENCOUNTER — TELEPHONE (OUTPATIENT)
Dept: INTERNAL MEDICINE | Age: 62
End: 2022-04-18

## 2022-04-18 NOTE — TELEPHONE ENCOUNTER
----- Message from Stacy Tasha sent at 4/15/2022  1:28 PM EDT -----  Subject: Message to Provider    QUESTIONS  Information for Provider? david from [employer] would lie a   call due some pt clarity for medical supplies. ... david can be reached at   [phone]  ---------------------------------------------------------------------------  --------------  [address]  What is the best way for the office to contact you? OK to leave message on   voicemail  Preferred Call Back Phone Number? [phone]  ---------------------------------------------------------------------------  --------------  SCRIPT ANSWERS  Relationship to Patient? Third Party  Third Party Type? Durable Medical Equipment? Representative Name?  david from [employer]

## 2022-04-18 NOTE — TELEPHONE ENCOUNTER
Called and spoke with Jarrell. States calling to confirm order and to see if fax was received. Notified Dr. Layla Cerda not in office this week. States ok to have other Dr sign order. States they did confirm with patient that correct thing was ordered. Notified pt saw ortho in 2019 and reason brace originally ordered. Called and spoke with Carson Sanders regarding paperwork. States she did receive it and it is ready for Dr. Piero Gonzalez to sign.

## 2022-04-19 ENCOUNTER — TELEPHONE (OUTPATIENT)
Dept: NEUROLOGY | Age: 62
End: 2022-04-19

## 2022-04-20 LAB
H PYLORI ANTIGEN STOOL: NEGATIVE
HELICOBACTER PYLORI IGG: NORMAL

## 2022-04-25 ENCOUNTER — HOSPITAL ENCOUNTER (OUTPATIENT)
Dept: CT IMAGING | Age: 62
Discharge: HOME | End: 2022-04-27
Payer: MEDICARE

## 2022-04-25 DIAGNOSIS — Z87.891 PERSONAL HISTORY OF TOBACCO USE: ICD-10-CM

## 2022-04-25 PROCEDURE — 71271 CT THORAX LUNG CANCER SCR C-: CPT

## 2022-05-24 ENCOUNTER — TELEPHONE (OUTPATIENT)
Dept: ENT CLINIC | Age: 62
End: 2022-05-24

## 2022-05-24 RX ORDER — AZELASTINE HYDROCHLORIDE 137 UG/1
SPRAY, METERED NASAL
Qty: 30 ML | Refills: 1 | Status: SHIPPED
Start: 2022-05-24 | End: 2022-07-26 | Stop reason: SDUPTHER

## 2022-06-15 DIAGNOSIS — I42.0 DILATED CARDIOMYOPATHY (HCC): ICD-10-CM

## 2022-06-15 RX ORDER — DIGOXIN 250 MCG
250 TABLET ORAL DAILY
Qty: 90 TABLET | Refills: 3 | Status: SHIPPED | OUTPATIENT
Start: 2022-06-15

## 2022-06-16 ENCOUNTER — TELEPHONE (OUTPATIENT)
Dept: CASE MANAGEMENT | Age: 62
End: 2022-06-16

## 2022-06-16 NOTE — TELEPHONE ENCOUNTER
No call, encounter opened to process CT Lung Screening. CT Lung Screen: 4/25/2022    Impression   1. There is no pulmonary infiltrate, mass or suspicious pulmonary nodule.       LUNG RADS:   Per ACR Lung-RADS Version 1.1       Category 1, Negative (No nodules and definitely benign nodules).       Management: Continue annual lung screening with LDCT in 12 months.       RECOMMENDATIONS:   If you would like to register your patient with [facility], please contact the Nurse Navigator at   [phone]. Pack years: 18.5    Social History     Tobacco Use  Smoking Status: Current Every Day Smoker    Start Date:    Quit Date:    Types: Cigarettes   Packs/Day: 0.5   Years: 40   Pack Years: 18.5   Smokeless Tobacco: Never Used         Results letter sent to patient via my chart or mailed.      [address]

## 2022-07-05 DIAGNOSIS — E11.8 TYPE 2 DIABETES MELLITUS WITH COMPLICATION, WITHOUT LONG-TERM CURRENT USE OF INSULIN (HCC): Chronic | ICD-10-CM

## 2022-07-05 NOTE — TELEPHONE ENCOUNTER
Last Appointment:  3/21/2022  Future Appointments   Date Time Provider Pratik Radha   7/26/2022  8:15 [facility]   9/27/2022 10:15 AM Namrata Carreon DO [facility]LECTRO [facility]   9/27/2022 10:15 AM SCHEDULE, DEVICE CLINIC 1 FITO ELECTRO PHYS [facility]

## 2022-07-10 ENCOUNTER — TELEPHONE (OUTPATIENT)
Dept: ENT CLINIC | Age: 62
End: 2022-07-10

## 2022-07-11 RX ORDER — MONTELUKAST SODIUM 10 MG/1
TABLET ORAL
Qty: 30 TABLET | Refills: 3 | Status: SHIPPED
Start: 2022-07-11 | End: 2022-07-26 | Stop reason: SDUPTHER

## 2022-07-18 RX ORDER — MONTELUKAST SODIUM 10 MG/1
TABLET ORAL
Qty: 30 TABLET | Refills: 3 | OUTPATIENT
Start: 2022-07-18

## 2022-07-20 NOTE — TELEPHONE ENCOUNTER
Called pharmacy and spoke to Casey Thomas and for some reason the rx for singulair was not received on 7/11/22 at [address] gave rx to Casey Thomas.        Called wife back and informed that the pharmacy now has the rx

## 2022-07-20 NOTE — TELEPHONE ENCOUNTER
Wife called and requested a refill on singulair and the pharamcy has tried to contact us about this. Will look into it and get back to her.

## 2022-07-26 ENCOUNTER — OFFICE VISIT (OUTPATIENT)
Dept: ENT CLINIC | Age: 62
End: 2022-07-26
Payer: MEDICARE

## 2022-07-26 VITALS
HEIGHT: 76 IN | SYSTOLIC BLOOD PRESSURE: 145 MMHG | HEART RATE: 92 BPM | BODY MASS INDEX: 32.43 KG/M2 | DIASTOLIC BLOOD PRESSURE: 94 MMHG

## 2022-07-26 DIAGNOSIS — I42.0 DILATED CARDIOMYOPATHY (HCC): ICD-10-CM

## 2022-07-26 DIAGNOSIS — J30.1 ALLERGIC RHINITIS DUE TO POLLEN, UNSPECIFIED SEASONALITY: Primary | ICD-10-CM

## 2022-07-26 PROCEDURE — 99213 OFFICE O/P EST LOW 20 MIN: CPT | Performed by: OTOLARYNGOLOGY

## 2022-07-26 RX ORDER — FUROSEMIDE 20 MG/1
20 TABLET ORAL DAILY
Qty: 90 TABLET | Refills: 1 | Status: SHIPPED | OUTPATIENT
Start: 2022-07-26 | End: 2023-01-22

## 2022-07-26 RX ORDER — AZELASTINE HYDROCHLORIDE 137 UG/1
SPRAY, METERED NASAL
Qty: 30 ML | Refills: 1 | Status: SHIPPED | OUTPATIENT
Start: 2022-07-26

## 2022-07-26 RX ORDER — DOXEPIN HYDROCHLORIDE 25 MG/1
25 CAPSULE ORAL NIGHTLY
Qty: 30 CAPSULE | Refills: 5 | Status: SHIPPED | OUTPATIENT
Start: 2022-07-26 | End: 2023-01-22

## 2022-07-26 RX ORDER — MONTELUKAST SODIUM 10 MG/1
TABLET ORAL
Qty: 30 TABLET | Refills: 3 | Status: SHIPPED | OUTPATIENT
Start: 2022-07-26

## 2022-07-26 ASSESSMENT — ENCOUNTER SYMPTOMS
COUGH: 0
TROUBLE SWALLOWING: 0
SHORTNESS OF BREATH: 0
SORE THROAT: 0
VOMITING: 0
SINUS PRESSURE: 0

## 2022-07-26 NOTE — PROGRESS NOTES
[id number] [facility] Otolaryngology  Dr. Jodi Mayo. Michael Grey. Ms.Ed        Patient Name:  Anastasia Escalera  :  1960     CHIEF C/O:    Chief Complaint   Patient presents with    Follow-up     6mo f/u sinus       HISTORY OBTAINED FROM:  patient    HISTORY OF PRESENT ILLNESS:       Jayce Benson is a 60y.o. year old male, here today for follow up of history of chronic allergic rhinitis and seasonal allergies with a known history of significant sinusitis is now much better controlled on chronic and persistent regimen of Flonase Astelin and Singulair. Patient tolerated medications well with no known side effects. No complaints epistaxis fever chills shortness of breath stridor or recent sinus infection. No complains of difficulty swallowing headaches or vision changes. Past Medical History:   Diagnosis Date    Arthritis     Atrial fibrillation ([address]     Bruising tendency ([address]     Cardiomyopathy ([address]     probably viral, LV systolic dysfunction EF 37% 2011    Cerebral artery occlusion with cerebral infarction [facility])     TIA    CHF (congestive heart failure) ([address]     ef 20    Diabetes mellitus ([address]     Family history of colon cancer 2014    Headache(784.0)     Heart trouble ,     Hospitalized (WHAT TYPE?)    History of blood transfusion     History of frequent ear infections     History of substance abuse ([address] 10/6/2011    A. Abstinence since  B. H/O significant alcohol consumption     Hx of blood clots     HEART    Hyperlipidemia     Hypertension     Memory loss     Recurrent infections , ,     ASK WHERE?     Speech problem     Vertebrobasilar TIAs 10/15/2015    2012      Past Surgical History:   Procedure Laterality Date    A-V CARDIAC PACEMAKER INSERTION Left 2017    Dual ICD removed Dual chamber Medtronic Pacer insertion by Dr William Nixon  2013    aflutter ablation    CARDIAC DEFIBRILLATOR PLACEMENT  10/27/2011    Dr. Loan Damon, dual chamber  Medtronic    CARDIOVERSION  11/18/2013    Cardioversion   Dr. Lorie Torres    COLONOSCOPY      SINUS SURGERY      polyps removed     TONSILLECTOMY      TRANSESOPHAGEAL ECHOCARDIOGRAM  12/08/2011         VASECTOMY  1998       Current Outpatient Medications:     montelukast (SINGULAIR) 10 MG tablet, TAKE 1 TABLET BY MOUTH EVERY DAY, Disp: 30 tablet, Rfl: 3    metFORMIN (GLUCOPHAGE) 1000 MG tablet, Take 1 tablet by mouth 2 times daily (with meals), Disp: 180 tablet, Rfl: 1    digoxin (DIGOX) 250 MCG tablet, Take 1 tablet by mouth daily, Disp: 90 tablet, Rfl: 3    Azelastine HCl 137 MCG/SPRAY SOLN, INHALE INTO THE NOSTRIL TWO TIMES A DAY.  USE IN EACH NOSTRIL AS DIRECTED, Disp: 30 mL, Rfl: 1    furosemide (LASIX) 20 MG tablet, Take 1 tablet by mouth daily, Disp: 90 tablet, Rfl: 1    lisinopril (PRINIVIL;ZESTRIL) 10 MG tablet, Take 1 tablet by mouth daily, Disp: 90 tablet, Rfl: 1    linagliptin (TRADJENTA) 5 MG tablet, Take 1 tablet by mouth daily, Disp: 90 tablet, Rfl: 1    atorvastatin (LIPITOR) 80 MG tablet, Take 1 tablet by mouth daily take 1 tablet by mouth every evening, Disp: 90 tablet, Rfl: 1    carvedilol (COREG) 12.5 MG tablet, Take 1 tablet by mouth 2 times daily (with meals), Disp: 90 tablet, Rfl: 3    spironolactone (ALDACTONE) 25 MG tablet, Take 1 tablet by mouth daily, Disp: 90 tablet, Rfl: 1    albuterol sulfate HFA (PROVENTIL HFA) 108 (90 Base) MCG/ACT inhaler, Inhale 2 puffs into the lungs every 6 hours as needed for Wheezing, Disp: 18 g, Rfl: 5    famotidine (PEPCID) 20 MG tablet, Take 1 tablet by mouth 2 times daily, Disp: 180 tablet, Rfl: 1    fluticasone (FLONASE) 50 MCG/ACT nasal spray, INHALE TWO SPRAYS INTO EACH NOSTRIL ONCE DAILY, Disp: 16 g, Rfl: 5    albuterol (PROVENTIL) (2.5 MG/3ML) 0.083% nebulizer solution, Take 3 mLs by nebulization every 6 hours as needed for Wheezing, Disp: 120 each, Rfl: 3    baclofen (LIORESAL) 20 MG tablet, TAKE 1 TABLET BY MOUTH TWO TIMES A DAY, Disp: 60 tablet, Rfl: 11    Naproxen Sodium 220 MG CAPS, Take by mouth, Disp: , Rfl:     aspirin 81 MG tablet, Take 1 tablet by mouth daily, Disp: 30 tablet, Rfl: 5    doxepin (SINEQUAN) 25 MG capsule, Take 1 capsule by mouth nightly, Disp: 30 capsule, Rfl: 5    Misc. Devices MISC, 1 each by Does not apply route once as needed for up to 1 dose. Nebulizer and tubing., Disp: 1 Device, Rfl: 0  Levaquin [levofloxacin in d5w], Penicillins, and Sulfa antibiotics  Social History     Tobacco Use    Smoking status: Every Day     Packs/day: 0.50     Years: 37.00     Pack years: 18.50     Types: Cigarettes    Smokeless tobacco: Never    Tobacco comments:     actively working on quitting. Vaping Use    Vaping Use: Former    Start date: 5/1/2018    Quit date: 6/1/2020    Substances: Always   Substance Use Topics    Alcohol use: Yes     Alcohol/week: 0.0 standard drinks     Comment: social    Drug use: Not Currently     Types: Marijuana Candiss Littler), Cocaine, Opiates , Methamphetamines (Crystal Meth)     Comment: past use in his 19's; none since     Family History   Problem Relation Age of Onset    Cancer Mother         colon, mets to liver and lungs    No Known Problems Sister     High Blood Pressure Brother     High Cholesterol Brother        Review of Systems   Constitutional:  Negative for chills and fever. HENT:  Positive for congestion. Negative for ear discharge, hearing loss, sinus pressure, sneezing, sore throat, tinnitus and trouble swallowing. Respiratory:  Negative for cough and shortness of breath. Cardiovascular:  Negative for chest pain and palpitations. Gastrointestinal:  Negative for vomiting. Skin:  Negative for rash. Allergic/Immunologic: Negative for environmental allergies. Neurological:  Negative for dizziness and headaches. Hematological:  Does not bruise/bleed easily. All other systems reviewed and are negative.     BP (!) 145/94 (Site: Right Upper Arm, Position: Sitting, Cuff Size: Medium Adult)   Pulse 92 Ht 6' 4\" (1.93 m)   BMI 32.43 kg/m²   Physical Exam  Vitals and nursing note reviewed. Constitutional:       Appearance: He is well-developed. HENT:      Head: Normocephalic and atraumatic. Jaw: No trismus, tenderness or swelling. Right Ear: Tympanic membrane and ear canal normal.      Left Ear: Tympanic membrane and ear canal normal.      Nose: Nose normal. No congestion or rhinorrhea. Mouth/Throat:      Lips: No lesions. Mouth: Mucous membranes are dry. Palate: No mass and lesions. Pharynx: Oropharynx is clear. Eyes:      Pupils: Pupils are equal, round, and reactive to light. Neck:      Thyroid: No thyromegaly. Trachea: No tracheal deviation. Cardiovascular:      Rate and Rhythm: Normal rate. Pulmonary:      Effort: Pulmonary effort is normal. No respiratory distress. Musculoskeletal:         General: Normal range of motion. Cervical back: Normal range of motion. Lymphadenopathy:      Cervical: No cervical adenopathy. Skin:     General: Skin is warm. Findings: No erythema. Neurological:      Mental Status: He is alert. Cranial Nerves: No cranial nerve deficit. IMPRESSION/PLAN:  Allergic rhinitis   Controlled on Astelin, Flonase and Singulair  No new issues  Follow up in 1 year                  Terri Beckford  1960      I have discussed the case, including pertinent history and exam findings with the resident. I have seen and examined the patient and the key elements of the encounter have been performed by me. I agree with the assessment, plan and orders as documented by the resident. Patient here for follow up of medical problems. Remainder of medical problems as per resident note.       [facility],   8/24/22

## 2022-08-16 ENCOUNTER — SCHEDULED TELEPHONE ENCOUNTER (OUTPATIENT)
Dept: INTERNAL MEDICINE | Age: 62
End: 2022-08-16
Payer: MEDICARE

## 2022-08-16 DIAGNOSIS — U07.1 COVID-19: Primary | ICD-10-CM

## 2022-08-16 DIAGNOSIS — R05.9 COUGH: ICD-10-CM

## 2022-08-16 PROCEDURE — 99213 OFFICE O/P EST LOW 20 MIN: CPT | Performed by: INTERNAL MEDICINE

## 2022-08-16 NOTE — PATIENT INSTRUCTIONS
Dear Niesha Lechuga,        Thank you for coming to your appointment today. I hope we have addressed all of your needs. Please make sure to do the following:   Conservative management (continue flonase, rescue inhaler, Singulair, tylenol, motrin). Paxlovid 300/100 mg PO BID for 5 days  Discussed about Digoxin and Paxlovid interaction: may increase the serum concentration of Digoxin. Management: Reduce the digoxin dose by approximately 30% to 50%, or reduce the dosing frequency, when these agents are combined. Monitor digoxin levels closely and we will reduce the dose by 50%. Patient has a pill splitter and will reduce while taking Paxlovid. COVID-19 rebound has been reported to occur between 2 and 8 days after initialrecovery    - We will see each other again in 1 week. Call for a sooner appointment if needed. Have a great day!         Sincerely,  Florian Her M.D  8/16/2022  11:11 AM

## 2022-08-16 NOTE — PROGRESS NOTES
Special Virtual Visit done per    Patients questions were addressed and answered Printed AVS  was mailed to pt

## 2022-08-16 NOTE — PROGRESS NOTES
[facility]  [facility]    Attending Physician's Statement      TeleMedicine Patient Consent    This visit was performed as phone visit. Patient identification was verified at the start of the visit, including the patient's telephone number and physical location. I discussed with the patient the nature of our telehealth visits, that:     I would evaluate the patient and recommend diagnostics and treatments based on my assessment. If it is felt that the patient should be evaluated in the clinic or an emergency room setting, then they would be directed there. Our sessions are not being recorded and that personal health information is protected. Our team would provide follow up care in person if/when the patient needs it. Patient does agree to proceed with telemedicine consultation. Patient's location: home address in [address] [place], [place] [address]way infection from wife's son who had COVID a week ago. Second day on onset of infection, HO smoking and on spiriva. NO fever, has been doing conservative treatment. HO DM, and a candidate for Paxlovid. I have discussed the case, including pertinent history with the medical resident. I agree with the assessment, plan and orders as documented by the resident. I have reviewed all the pertinent PMHx, PSHx, Family Hx, Social Hx, medications and allergies, and updated history as appropriate. Patient presents for telephone visit. Pertinent lab results and imaging studies have been reviewed. Medical problems, assessment and plan per medical resident's note. Time spent:10 - 12 mins      Xavier Brar MD.  Faculty, Internal Medicine Residency    8/16/2022      The patient is being evaluated by a telephone encounter to address concerns as mentioned above. A caregiver was present when appropriate.  Due to this being a TeleHealth encounter (During [facility]-99 public [facility] emergency), evaluation of the following organ systems was limited: Vitals/Constitutional/EENT/Resp/CV/GI//MS/Neuro/Skin/Heme-Lymph-Imm. Pursuant to the emergency declaration under the [address] [place] [address] and the [employer] and [employer] Act, this Virtual Visit was conducted with patient's (and/or legal guardian's) consent, to reduce the patient's risk of exposure to COVID-19 and provide necessary medical care. The patient (and/or legal guardian) has also been advised to contact this office for worsening conditions or problems, and seek emergency medical treatment and/or call 911 if deemed necessary. Services were provided through a video synchronous discussion virtually to substitute for in-person clinic visit. Patient and provider were located at their individual homes.

## 2022-08-16 NOTE — PROGRESS NOTES
Consent:  The Patient and/or health care decision maker is aware that that he or she may receive a bill for this telephone service, depending on his insurance coverage, and has provided verbal consent to proceed: Yes      Documentation:  I communicated with the patient and/or health care decision maker about the below assessment and plan. Details of this discussion including any medical advice provided: Today's phone call visit is in regards to COVID-19 infection. Patient's wife's son was positive one week ago with similar symptoms, but worse. Her son was positive on testing. He tested negative yesterday, and positive today. Yesterday, his symptoms started with chills, myalgia, coughing with white sputum hemoptysis, headache, shortness of breath relieved with rescue albuterol inhaler, and some lightheadedness/dizziness. Denies lost of taste or smell, hemoptysis, syncope, chest pain. Has a nebulizer machine, 8years old and will new mask for his machine in the future. Has had 2 COVID vaccines, Moderna, last dose 4/2021. He is <65, DM requiring medication (last hemoglobin A1c 8.2% on 3/21/2022) and qualifies for Paxlovid. No hx CKD, last Cr 1.0 mg/dL in 9/2021, and will qualify for the 300/100 mg PO BID for 5 days dosage. Hx seasonal allergies and allergic rhinitis. Uses Flonase, Astelin, and Singulair. Current every day smoker, 1 PY x 37 years. Cigs. Reviewed home medications. Plan:  Conservative management, continue home medications including rescue albuterol inhaler as needed, flonase, singulair. Start Paxlovid 300/100 mg PO BID for 5 days, patient agreeable. Discussed about Digoxin and Paxlovid interaction: Ritonavir may increase the serum concentration of Digoxin. Management: Reduce the digoxin dose by approximately 30% to 50%, or reduce the dosing frequency, when these agents are combined. Monitor digoxin levels closely and we will reduce the dose by 50%.  Patient has a pill splitter and will reduce while taking Paxlovid. COVID-19 rebound has been reported to occur between 2 and 8 days after initialrecovery        I affirm this is a Patient Initiated Episode with a Patient who has not had a related appointment within my department in the past 7 days or scheduled within the next 24 hours. Patient identification was verified at the start of the visit: Yes        Patient's location: home address in [address] home address in [facility].         Total Time: minutes: 5-10 minutes        Electronically signed by Pop Marrufo MD on 8/16/2022 at 11:16 AM     Attending physician: Dr. Andrew Mauricio

## 2022-08-22 ASSESSMENT — ENCOUNTER SYMPTOMS
CONSTIPATION: 0
NAUSEA: 0
SHORTNESS OF BREATH: 0
DIARRHEA: 0
COUGH: 0
ABDOMINAL PAIN: 0
BLOOD IN STOOL: 0
VOMITING: 0
BACK PAIN: 0

## 2022-08-22 NOTE — PROGRESS NOTES
Terri Zayas (:  1960) is a 60 y.o. male,{New vs Established:[id number]::\"Established patient\"}, here for evaluation of the following chief complaint(s):  No chief complaint on file. Was Telemedicine visit last 22 concerns for COVID-19 infection. ASSESSMENT/PLAN:  {There are no diagnoses linked to this encounter. (Refresh or delete this SmartLink)}    Allergic rhinitis  -on Astelin, Flonase and Singulair  -seen by Dr. Rob Tillman last 22, follow-up in a year    COVID-19 infection  -telemedicine visit last 22  -prescribed with Paxlovid 300/100 mg PO BID x 5 days  -advised to continue albuterol inhaler prn, Flonase and montelukast  -advised to reduce digoxin by half because of interaction with ritonavir  Plan      Hx tension type headache  -on baclofen 40 mg at HS with past moderate relief  -prescribed with topiramate before but with no improvement  -seen by Neurology 22: added doxepin 25 mg at HS, baclofen continued  -scheduled for follow-up again this month  Plan  -continue baclofen and doxepin    S/p CVA  -Hx embolic stroke?  -CT head non-confirmatory  -follows with neurology  -on aspirin and high-dose statin    HTN  -BP at home  -BP in the clinic  -denies s/sx of orthostasis  -last CMP 21: crea 1, K 4.3 , Na 139, other result wnl  -on lisinopril, carvedilol, spironolactone  Plan  -continue meds    HLD  -last lipid panel 21: LDL 64  -The ASCVD Risk score (Erlin Mitchell, et al., 2013) failed to calculate for the following reasons:     The valid total cholesterol range is 130 to 320 mg/dL   -on high dose statin  Plan  -continue atorvastatin 80 mg daily  -repeat lipid panel    Non ischemic (viral) cardiomyopathy  -BP at home  -denies s/sx of orthostasis  -Cardiac stress test 2020  -Large fixed inferior defect probably due to increased bowel uptake and or diaphragmatic attenuation artifact.  -Absence of reversible defects.  -Absence of segmental wall motion abnormalities.  -Ejection fraction 60%  -last lipid panel 9/16/21: LDL 64  -last CMP 9/16/21: crea 1, K 4.3 , Na 139, other result wnl  -seen by cardiology 3/30/22: plan to increase lisinopril to twice daily if BP is not at goal  -on lisinopril 10 mg daily, spironolactone 25 mg daily, carvedilol 12.5 mg BID, aspirin 81 mg daily, atorvastatin 80 mg daily, digoxin 250 mcg daily  Plan  -continue meds  -repeat lipid panel and CMP    Paroxysmal atrial flutter   -s/p ablation 2013, AICD 2011 downgraded to pacemaker 4/28/2017 (normalization of LV function and EF)  - seen by Dr. Atiya Marquez 3/30/22  -EKG 3/30/22: sinus rhythm with left atrial enlargement versus ectopic atrial rhythm, incomplete right bundle branch block, left ventricular hypertrophy with nonspecific ST-T wave changes.  -was referred back to EP (Dr. Gerardo Mary)  -s/p pacemaker interrogation 6/22/22: AT/AF burden <1%  -on carvedilol and digoxin 250 mcg daily  Plan  -continue meds    L atrial appendage clot    GERD    DM 2  -blood sugar at home:   -BP at home:  -last HBA1C 3/21/22: 8.2% from 7.6 (9/16/21)  -last lipid panel 9/16/21: LDL 64  -last CMP 9/16/21: crea 1, K 4.3 , Na 139, other result wnl  -UACR 3/16/22: 55   -on linagliptin 5 mg daily, metformin 100 mg BID  Plan  -continue medications  -repeat CMP  -POCT HBA1C    Tobacco abuse  -still smokes   Plan  -advised on smoking cessation        No follow-ups on file. Subjective   SUBJECTIVE/OBJECTIVE:  This is H. F. A 57year-old morbidly obese chronic smoker male with a past medical history of allergic rhinitis, nonischemic cardiomyopathy, paroxysmal atrial flutter, left atrial appendage clot, status post ablation by Dr. Gerardo Mary, status post AICD which was exchanged to a pacemaker in April 2017, status post CVA, diabetes, hypertension, hyperlipidemia, and of ex-alcohol abuse who comes into the clinic for follow-up.   He had telemedicine visit last 8/16/22 for COVID-19 infection and was started on Paxlovid and advised on supportive measures. He denies headache, lightheadedness, dizziness, fever, cough, colds, dyspnea, chest pain, orthopnea, PND, leg swelling, abdominal pain, heartburn, nausea, vomiting, unintentional weight loss, dysuria, diarrhea, constipation, numbness, tingling or focal weakness. Review of Systems   Constitutional:  Negative for chills and fever. HENT:  Negative for congestion. Respiratory:  Negative for cough and shortness of breath. Cardiovascular:  Negative for chest pain, palpitations and leg swelling. Gastrointestinal:  Negative for abdominal pain, blood in stool, constipation, diarrhea, nausea and vomiting. Musculoskeletal:  Negative for back pain and myalgias. Skin:  Negative for rash. Neurological:  Negative for dizziness and headaches. Psychiatric/Behavioral:  The patient is not nervous/anxious. Objective   Physical Exam  Constitutional:       General: He is not in acute distress. Appearance: He is well-developed. HENT:      Head: Normocephalic and atraumatic. Eyes:      General: No scleral icterus. Conjunctiva/sclera: Conjunctivae normal.   Neck:      Trachea: No tracheal deviation. Cardiovascular:      Rate and Rhythm: Normal rate. Heart sounds: No murmur heard. Pulmonary:      Effort: Pulmonary effort is normal. No respiratory distress. Breath sounds: Normal breath sounds. Abdominal:      General: Bowel sounds are normal. There is no distension. Palpations: Abdomen is soft. Tenderness: There is no abdominal tenderness. Musculoskeletal:         General: Normal range of motion. Cervical back: Normal range of motion. Skin:     General: Skin is warm and dry. Neurological:      Mental Status: He is alert and oriented to person, place, and time. Psychiatric:         Behavior: Behavior normal.         Thought Content:  Thought content normal.         Judgment: Judgment normal.          {Time Documentation Optional:[id number]}      An electronic signature was used to authenticate this note.     --Tobie Castleman, MD

## 2022-08-23 ENCOUNTER — SCHEDULED TELEPHONE ENCOUNTER (OUTPATIENT)
Dept: INTERNAL MEDICINE | Age: 62
End: 2022-08-23
Payer: MEDICARE

## 2022-08-23 DIAGNOSIS — I42.0 DILATED CARDIOMYOPATHY (HCC): ICD-10-CM

## 2022-08-23 DIAGNOSIS — J30.9 ALLERGIC RHINITIS, UNSPECIFIED SEASONALITY, UNSPECIFIED TRIGGER: Primary | ICD-10-CM

## 2022-08-23 DIAGNOSIS — E11.8 TYPE 2 DIABETES MELLITUS WITH COMPLICATION, WITHOUT LONG-TERM CURRENT USE OF INSULIN (HCC): Chronic | ICD-10-CM

## 2022-08-23 DIAGNOSIS — K21.9 GASTROESOPHAGEAL REFLUX DISEASE WITHOUT ESOPHAGITIS: ICD-10-CM

## 2022-08-23 DIAGNOSIS — J40 BRONCHITIS: ICD-10-CM

## 2022-08-23 DIAGNOSIS — E78.5 DYSLIPIDEMIA: ICD-10-CM

## 2022-08-23 PROCEDURE — 99421 OL DIG E/M SVC 5-10 MIN: CPT | Performed by: INTERNAL MEDICINE

## 2022-08-23 RX ORDER — ALBUTEROL SULFATE 2.5 MG/3ML
2.5 SOLUTION RESPIRATORY (INHALATION) EVERY 6 HOURS PRN
Qty: 120 EACH | Refills: 1 | Status: SHIPPED | OUTPATIENT
Start: 2022-08-23

## 2022-08-23 RX ORDER — ATORVASTATIN CALCIUM 80 MG/1
80 TABLET, FILM COATED ORAL DAILY
Qty: 90 TABLET | Refills: 1 | Status: SHIPPED | OUTPATIENT
Start: 2022-08-23 | End: 2023-02-19

## 2022-08-23 RX ORDER — SPIRONOLACTONE 25 MG/1
25 TABLET ORAL DAILY
Qty: 90 TABLET | Refills: 1 | Status: SHIPPED | OUTPATIENT
Start: 2022-08-23 | End: 2023-02-19

## 2022-08-23 RX ORDER — FAMOTIDINE 20 MG/1
20 TABLET, FILM COATED ORAL 2 TIMES DAILY
Qty: 180 TABLET | Refills: 1 | Status: SHIPPED | OUTPATIENT
Start: 2022-08-23 | End: 2023-02-19

## 2022-08-23 RX ORDER — FLUTICASONE PROPIONATE 50 MCG
SPRAY, SUSPENSION (ML) NASAL
Qty: 16 G | Refills: 1 | Status: SHIPPED | OUTPATIENT
Start: 2022-08-23

## 2022-08-23 RX ORDER — LISINOPRIL 10 MG/1
10 TABLET ORAL DAILY
Qty: 90 TABLET | Refills: 1 | Status: SHIPPED | OUTPATIENT
Start: 2022-08-23 | End: 2023-02-19

## 2022-08-23 NOTE — PROGRESS NOTES
[facility]  [facility]    Attending Physician's Statement      TeleMedicine Patient Consent    This visit was performed as phone visit. Patient identification was verified at the start of the visit, including the patient's telephone number and physical location. I discussed with the patient the nature of our telehealth visits, that:     I would evaluate the patient and recommend diagnostics and treatments based on my assessment. If it is felt that the patient should be evaluated in the clinic or an emergency room setting, then they would be directed there. Our sessions are not being recorded and that personal health information is protected. Our team would provide follow up care in person if/when the patient needs it. Patient does agree to proceed with telemedicine consultation. Patient's location: home address in [place]    I have discussed the case, including pertinent history with the medical resident. I agree with the assessment, plan and orders as documented by the resident. I have reviewed all the pertinent PMHx, PSHx, Family Hx, Social Hx, medications and allergies, and updated history as appropriate. Patient presents for telephone visit. COVID follow up, was on Paxlovid completed two days ago and resume regular dose of digoxin, still has mild coughing with improvement in other constitutional symptoms, needs regular follow up in September 16. Pertinent lab results and imaging studies have been reviewed. Medical problems, assessment and plan per medical resident's note. Time spent 7-8 mins      Yadira Shaffer MD.  Faculty, Internal Medicine Residency  8/23/2022      The patient is being evaluated by a telephone encounter to address concerns as mentioned above. A caregiver was present when appropriate.  Due to this being a TeleHealth encounter (During [facility]TS[facility]-81 public health emergency), evaluation of the following organ systems was limited: Vitals/Constitutional/EENT/Resp/CV/GI//MS/Neuro/Skin/Heme-Lymph-Imm. Pursuant to the emergency declaration under the [address] [place] [address] and the [employer] and [employer] Act, this Virtual Visit was conducted with patient's (and/or legal guardian's) consent, to reduce the patient's risk of exposure to COVID-19 and provide necessary medical care. The patient (and/or legal guardian) has also been advised to contact this office for worsening conditions or problems, and seek emergency medical treatment and/or call 911 if deemed necessary. Services were provided through a video synchronous discussion virtually to substitute for in-person clinic visit. Patient and provider were located at their individual homes.

## 2022-08-23 NOTE — PROGRESS NOTES
Trinidad Castillo is a 60 y.o. male evaluated via telephone on 8/23/2022 for Post-COVID Symptoms  . Documentation:  I communicated with the patient and/or health care decision maker about the assessment and plan. Details of this discussion including any medical advice provided:    COVID-19 infection  -telemedicine visit last 8/16/22  -prescribed with Paxlovid 300/100 mg PO BID x 5 days,   -advised to continue albuterol inhaler prn, Flonase and montelukast  -advised to reduce digoxin by half because of interaction with ritonavir  -completed Paxlovid 2 days ago and he resumed his usual dose of digoxin  -still with cough, decreased frequency, and decreased appetite but improving  -denies fever, chills, SOB, chest pain, palpitations, abdominal pain, nausea, vomiting, diarrhea, urinary changes  -at home: /81, HR 95  Plan  -continue supportive measures, including albuterol inhaler prn, Flonase and montelukast  -continue with regular medications  -advised to go the the nearest ED or Urgent care if with worsening symptoms, shortness of breath or chest pain.   -advised regular follow-up on September 16, 2022       Total Time: minutes: 5-10 minutes    ArshBillMitchell Payan was evaluated through a synchronous (real-time) audio encounter. Patient identification was verified at the start of the visit. He (or guardian if applicable) is aware that this is a billable service, which includes applicable co-pays. This visit was conducted with the patient's (and/or legal guardian's) verbal consent. He has not had a related appointment within my department in the past 7 days or scheduled within the next 24 hours. The patient was located at Home: [address] [place] [phone]. The provider was located at Home ([address]: [place].     Note: not billable if this call serves to triage the patient into an appointment for the relevant concern    James Holloway MD

## 2022-08-23 NOTE — PATIENT INSTRUCTIONS
Take your medications regularly. Resume taking your regular dose of digoxin. Regular follow-up on September 16, 2022.

## 2022-09-15 NOTE — PROGRESS NOTES
Medicare Annual Wellness Visit    [address] is here for Medicare AWV, Medication Refill, and Diabetes (Patient states his BS this am was 128)    Assessment & Plan   Flu vaccine need  -     Influenza, AFLURIA, (age 1 y+), IM, Preservative Free, 0.5 mL  Dyslipidemia  -     LIPID PANEL; Future  Type 2 diabetes mellitus with complication, without long-term current use of insulin (HCC)  -     dapagliflozin (FARXIGA) 5 MG tablet; Take 1 tablet by mouth every morning, Disp-90 tablet, R-1Normal  -     POCT glycosylated hemoglobin (Hb A1C)  -     LIPID PANEL; Future  -     Comprehensive Metabolic Panel; Future  Vitamin D deficiency  -     Vitamin D 25 Hydroxy; Future    Recommendations for Preventive Services Due: see orders and patient instructions/AVS.  Recommended screening schedule for the next 5-10 years is provided to the patient in written form: see Patient Instructions/AVS.     Return in about 6 months (around 3/16/2023) for follow up. Subjective     Patient's complete Health Risk Assessment and screening values have been reviewed and are found in Flowsheets. The following problems were reviewed today and where indicated follow up appointments were made and/or referrals ordered.     Positive Risk Factor Screenings with Interventions:       Tobacco Use:  Tobacco Use: High Risk    Smoking Tobacco Use: Every Day    Smokeless Tobacco Use: Never     E-cigarette/Vaping       Questions Responses    E-cigarette/Vaping Use Former User    Start Date 5/1/18    Passive Exposure     Quit Date 6/1/20    Counseling Given     Comments Mid-Size or Vape Pen          Substance Use - Tobacco Interventions:  tobacco cessation tips and resources provided, patient is not ready to work toward tobacco cessation at this time         General Health and ACP:  General  In general, how would you say your health is?: Good  In the past 7 days, have you experienced any of the following: New or Increased Pain, New or Increased Fatigue, Loneliness, Social Isolation, Stress or Anger?: No  Do you get the social and emotional support that you need?: Yes  Do you have a Living Will?: (!) No    Advance Directives       Power of Ally Vazquez Will ACP-Advance Directive ACP-Power of     Not on File Filed on 03/04/12 Filed Not on File        General Health Risk Interventions:  No Living Will: Patient declines ACP discussion/assistance    Health Habits/Nutrition:  Physical Activity: Sufficiently Active    Days of Exercise per Week: 6 days    Minutes of Exercise per Session: 40 min     Have you lost any weight without trying in the past 3 months?: (!) Yes  Body mass index: (!) 29.26  Have you seen the dentist within the past year?: (!) No  Health Habits/Nutrition Interventions:  none    Hearing/Vision:  Do you or your family notice any trouble with your hearing that hasn't been managed with hearing aids?: No  Do you have difficulty driving, watching TV, or doing any of your daily activities because of your eyesight?: No  Have you had an eye exam within the past year?: (!) No  No results found. Hearing/Vision Interventions:  Hearing concerns:  patient declines any further evaluation/treatment for this issue  Vision concerns:  patient declines any further evaluation/treatment for this issue            Objective   Vitals:    09/16/22 1020   BP: 123/81   Site: Right Upper Arm   Position: Sitting   Cuff Size: Medium Adult   Pulse: 76   Resp: 20   Temp: 97.5 °F (36.4 °C)   TempSrc: Temporal   SpO2: 97%   Weight: 240 lb 6.4 oz (109 kg)   Height: 6' 4\" (1.93 m)      Body mass index is 29.26 kg/m².       General Appearance: alert and oriented to person, place and time, well developed and well- nourished, in no acute distress  Skin: warm and dry, no rash or erythema  Head: normocephalic and atraumatic  Eyes: pupils equal, round, and reactive to light, extraocular eye movements intact, conjunctivae normal  ENT: tympanic membrane, external ear and ear canal normal bilaterally, nose without deformity, nasal mucosa and turbinates normal without polyps  Neck: supple and non-tender without mass, no thyromegaly or thyroid nodules, no cervical lymphadenopathy  Pulmonary/Chest: clear to auscultation bilaterally- no wheezes, rales or rhonchi, normal air movement, no respiratory distress  Cardiovascular: normal rate, regular rhythm, normal S1 and S2, no murmurs, rubs, clicks, or gallops, distal pulses intact, no carotid bruits  Abdomen: soft, non-tender, non-distended, normal bowel sounds, no masses or organomegaly  Extremities: no cyanosis, clubbing or edema  Musculoskeletal: normal range of motion, no joint swelling, deformity or tenderness  Neurologic: reflexes normal and symmetric, no cranial nerve deficit, gait, coordination and speech normal       Allergies   Allergen Reactions    Levaquin [Levofloxacin In D5w]     Penicillins Anaphylaxis    Sulfa Antibiotics Anaphylaxis     Prior to Visit Medications    Medication Sig Taking?  Authorizing Provider   dapagliflozin (FARXIGA) 5 MG tablet Take 5 mg by mouth every morning Yes Historical Provider, MD   lisinopril (PRINIVIL;ZESTRIL) 10 MG tablet Take 1 tablet by mouth daily Yes Helga Ross MD   linagliptin (TRADJENTA) 5 MG tablet Take 1 tablet by mouth daily Yes Helga Ross MD   atorvastatin (LIPITOR) 80 MG tablet Take 1 tablet by mouth daily take 1 tablet by mouth every evening Yes Helga Ross MD   spironolactone (ALDACTONE) 25 MG tablet Take 1 tablet by mouth daily Yes Helga Ross MD   famotidine (PEPCID) 20 MG tablet Take 1 tablet by mouth 2 times daily Yes Helga Ross MD   fluticasone (FLONASE) 50 MCG/ACT nasal spray INHALE TWO SPRAYS INTO EACH NOSTRIL ONCE DAILY Yes Helga Ross MD   albuterol (PROVENTIL) (2.5 MG/3ML) 0.083% nebulizer solution Take 3 mLs by nebulization every 6 hours as needed for Wheezing Yes Helga Ross MD   Azelastine HCl 137 MCG/SPRAY SOLN INHALE INTO THE NOSTRIL TWO TIMES A DAY. USE IN EACH NOSTRIL AS DIRECTED Yes Eamon Vieira DO   furosemide (LASIX) 20 MG tablet Take 1 tablet by mouth in the morning. Yes Eamon Vieira DO   montelukast (SINGULAIR) 10 MG tablet TAKE 1 TABLET BY MOUTH EVERY DAY Yes Eamon Vieira DO   doxepin (SINEQUAN) 25 MG capsule Take 1 capsule by mouth nightly Yes Trev Mendoza MD   metFORMIN (GLUCOPHAGE) 1000 MG tablet Take 1 tablet by mouth 2 times daily (with meals) Yes Lashawn Dillard MD   digoxin (DIGOX) 250 MCG tablet Take 1 tablet by mouth daily Yes Estefania Bloom MD   carvedilol (COREG) 12.5 MG tablet Take 1 tablet by mouth 2 times daily (with meals) Yes Arnulfo Navarro MD   albuterol sulfate HFA (PROVENTIL HFA) 108 (90 Base) MCG/ACT inhaler Inhale 2 puffs into the lungs every 6 hours as needed for Wheezing Yes Arnulfo Navarro MD   baclofen (LIORESAL) 20 MG tablet TAKE 1 TABLET BY MOUTH TWO TIMES A DAY Yes Trev Mendoza MD   Naproxen Sodium 220 MG CAPS Take by mouth Yes Historical Provider, MD   aspirin 81 MG tablet Take 1 tablet by mouth daily Yes Beverly Preston MD   Misc. Devices MISC 1 each by Does not apply route once as needed for up to 1 dose. Nebulizer and tubing. Eligio Son DO         MINICOG TEST: patient performed minicog during visit. The results are 5/5.      CareTeam (Including outside providers/suppliers regularly involved in providing care):   Patient Care Team:  Arnulfo Navarro MD as PCP - General (Internal Medicine)  Edison Falk DO as Consulting Physician (Electrophysiology)  Samantha Dickson MD as Consulting Physician (Cardiology)  GHANSHYAM Ojeda - NP as Certified Registered Nurse (Nurse Practitioner Adult [facility])  Ace Pineda as Imaging Navigator     Reviewed and updated this visit:  Tobacco  Allergies  Meds  Med Hx  Surg Hx  Soc Hx  Fam Hx

## 2022-09-16 ENCOUNTER — OFFICE VISIT (OUTPATIENT)
Dept: INTERNAL MEDICINE | Age: 62
End: 2022-09-16
Payer: MEDICARE

## 2022-09-16 VITALS
OXYGEN SATURATION: 97 % | BODY MASS INDEX: 29.27 KG/M2 | SYSTOLIC BLOOD PRESSURE: 123 MMHG | RESPIRATION RATE: 20 BRPM | HEIGHT: 76 IN | WEIGHT: 240.4 LBS | DIASTOLIC BLOOD PRESSURE: 81 MMHG | TEMPERATURE: 97.5 F | HEART RATE: 76 BPM

## 2022-09-16 DIAGNOSIS — Z23 FLU VACCINE NEED: Primary | ICD-10-CM

## 2022-09-16 DIAGNOSIS — E11.8 TYPE 2 DIABETES MELLITUS WITH COMPLICATION, WITHOUT LONG-TERM CURRENT USE OF INSULIN (HCC): Chronic | ICD-10-CM

## 2022-09-16 DIAGNOSIS — E78.5 DYSLIPIDEMIA: ICD-10-CM

## 2022-09-16 DIAGNOSIS — E55.9 VITAMIN D DEFICIENCY: ICD-10-CM

## 2022-09-16 LAB — HBA1C MFR BLD: 6.7 %

## 2022-09-16 PROCEDURE — 3044F HG A1C LEVEL LT 7.0%: CPT | Performed by: INTERNAL MEDICINE

## 2022-09-16 PROCEDURE — G0438 PPPS, INITIAL VISIT: HCPCS | Performed by: INTERNAL MEDICINE

## 2022-09-16 ASSESSMENT — LIFESTYLE VARIABLES
HOW MANY STANDARD DRINKS CONTAINING ALCOHOL DO YOU HAVE ON A TYPICAL DAY: 1 OR 2
HOW OFTEN DO YOU HAVE A DRINK CONTAINING ALCOHOL: MONTHLY OR LESS

## 2022-09-16 NOTE — PATIENT INSTRUCTIONS
Dear Magen Mohan,        Thank you for coming to your appointment today. I hope we have addressed all of your needs. Please make sure to do the following:  - Continue your medications as listed. - Get labs drawn before our next follow up. - We will see each other again in 6 months      Have a great day!         Sincerely,  Rafael Weaver M.D PGY-3  9/16/2022  11:07 AM

## 2022-09-16 NOTE — PROGRESS NOTES
[facility]    Attending Physician Statement:  Germaine Eisenmenger, M.D., F.A.C.P. I have discussed the case, including pertinent history and exam findings with the resident. I agree with the assessment, plan and orders as documented by the resident. Patient is seen for fu visit today. Last office notes reviewed, relative labs and imaging. Health maintenance issues of vaccinations, depression screening, tobacco cessation etc... covered  Medicare annual visit    Fu dm2 table metformin and gliflozin    /81 (Site: Right Upper Arm, Position: Sitting, Cuff Size: Medium Adult)   Pulse 76   Temp 97.5 °F (36.4 °C) (Temporal)   Resp 20   Ht 6' 4\" (1.93 m)   Wt 240 lb 6.4 oz (109 kg)   SpO2 97%   BMI 29.26 kg/m²   HTN stable  Cardiomyopathy, afib on digoxin and coreg +ASA  Pacemaker interrogated 6/12/22  Spionalactone, statin, ACE  No active pulm jess etc.. on lasix  Asthma/copd stable  CT lung April  There is no pulmonary infiltrate, mass or suspicious pulmonary nodule. Discussed living will  No depression  No falls risk  Dental - eye doc fu  Cognition good  Discussed covid booster (x2 vaccine already)  Flu vaccine today  (pneumococcal done 2017)  Plan colonscopy 2016      Remainder of medical problems as per resident note.

## 2022-09-28 DIAGNOSIS — K21.9 GASTROESOPHAGEAL REFLUX DISEASE WITHOUT ESOPHAGITIS: ICD-10-CM

## 2022-09-28 RX ORDER — FAMOTIDINE 20 MG/1
20 TABLET, FILM COATED ORAL 2 TIMES DAILY
Qty: 180 TABLET | Refills: 1 | OUTPATIENT
Start: 2022-09-28 | End: 2023-03-27

## 2022-10-04 ENCOUNTER — TELEPHONE (OUTPATIENT)
Dept: NON INVASIVE DIAGNOSTICS | Age: 62
End: 2022-10-04

## 2022-10-04 DIAGNOSIS — I48.0 PAROXYSMAL ATRIAL FIBRILLATION (HCC): Primary | ICD-10-CM

## 2022-10-04 NOTE — TELEPHONE ENCOUNTER
Gavi Coleman is a 60 y.o. male with a history of nonvalvular paroxysmal AF, typical CCW AFL sp DCCV x (2/14/12, 1/25/13, 12/13/13) and CTI RFA (12/13/13), CAROLA thrombus (12/2011), NYHA class II-III HFpEF-nonischemic/improved 2/2 AF/AFL, SND sp MDT dc PPM (dual coil ICD DOI: 10/27/11; downgrade to dc PPM with capping/abandoning of RV ICD lead: 4/28/17), TIA, DM2, obesity, EtOH abuse (no use since 1980's), and migraine HA. He is managed by Dr Rehana Perez with aspirin 81 mg daily, lipitor 80 mg daily, coreg 12.5 mg BID, digoxin 250 mcg daily, lasix 20 mg daily, lisinopril 10 mg daily, spironolactone 25 mg daily, and H2B. He is also on naproxen. In 7/2011, he was diagnosed with NICM and AF/AFL. Appears no attempt to restore SR was performed initially for unclear reason and LVEF remained reduced, so after 3 months of GDMT he was referred for MDT dc ICD. In 12/2011, a LASHAUN reportedly found CAROLA thrombus, but the details of this are unavailable to me. In 2/2012, he reportedly converted from AF to typical AFL, but ECG's of this are not available to me, which was treated with DCCV. In 1/2013, he was found to be typical CW AFL, which was treated with DCCV. In 12/2013, he converted from [employer] to typical CCW AFL, which was treated with DCCV and CTI RFA. Since that time, his AF/AFL burden has been < 1% on remote monitoring of ICD. In 5/2014, he was noted to have recovery of LVEF to 60% with rhythm control. In 4/2017, gonzález ESPINOSA and Dr Annalee Almonte downgraded patient's device to dual chamber pacemaker with capping/abandoning of RV dual coil ICD lead. Presumably extraction was offered and declined by patient, but unclear. Additionally, unclear why the tachy therapies were not just turned off instead of capping/abandoning RV ICD lead and implanting new RV pacing lead as this results in device no longer being MRI conditional, particularly as he has a history of TIAs.  In 9/2022, patient was scheduled to transfer EP care to my office, but left prior to visit due to waiting room delay. He device is enrolled in remote monitoring, which most recently reported stable device function, AT/AF burden of < 1% (1 episode on 8/30/22 of 1.5 hr duration with mean V rate of 94 bpm), RA pacing 80%, and RV pacing < 1%. Prior cardiac testing:  Pharm Nuc Stress (9/28/20): LVEF = 60%, persistent inferior perfusion defect 2/2 attenuation artifact related to bowel/diaphragm, and no ischemia. TTE (6/18/19): LVEF = 65%, stage I LVDD, mild TR. Limited TTE (4/3/17): LVEF = 65%, mild TR. Pharm Nuc Stress (1/11/17): LVEF = 50%, normal perfusion. TTE (7/11/16): LVEF = 68%, stage I LVDD. Pharm Nuc Stress (11/7/14): LVEF = 50%, normal perfusion. Limited TTE (5/7/14): LVEF = 60%, LV dilation, mild LVH, stage I LVDD. LASHAUN (12/2011): CAROLA thrombus (per office note, report not available to me). TTE (8/22/11): AF, LVEF = 30-35% with global hypokinesis, moderate LV dilation, severe LAE, moderate JEANNE, and mild TR.   TTE (7/20/11): AF, LVEF = 25%, severe LV dilation, mild-moderate LAE, mild MR with decreased mobility of anterior leaflet. Pharm Nuc Stress (7/8/11): AF, LVEF = 19%, LV dilation, TID = NR, and anterior wall attenuation 2/2 ?diaphragm. TTE (7/7/11): AF, LVEF = 22% with global hypokinesis, asymmetric septal LVH, mild MR, mild-moderate TR, RVSP ~30 mmHg.     Device Interrogation/Reprogramming  Make/Model: MDT Advisa DR GUTIERREZ  DOI: 4/28/17  Battery: 4.3 years  [place] Roche therapy: MVPR 70 - 130 ppm, AV delays of 300 msec sense and paced  Pacing %: RA = 80.7%, RV < 1%  Lead function:  RA lead: sensing = 3.25 mV, impedance = 361 ohms, threshold = 0.625 V @ 0.4 msec  RV lead: sensing = 5.5 mV, impedance = 361 ohms, threshold = 1.125 V @ 0.4 msec  Lead programming:  RA lead: sensitivity = 0.3 mV, output = 1.5 V @ 0.4 msec  RV lead: sensitivity = 1.2 mV, output = 2.5 V @ 0.4 msec  Arrhythmias: AT/AF burden < 1% (1 episode of 1.5 hr duration with mean V of 94 bpm)  Reprogramming included: none  Overall device function is normal  All device programmable settings were evaluated per above and in the scanned document, along with iterative adjustments (capture thresholds) to assess and select the most appropriate final programming to provide for consistent delivery of the appropriate therapy and to verify function of the device. A/P:  1. nonvalvular paroxysmal AF  - Initially diagnosed in 2011.  - CHADSVASC = 4 (HF, TIA, DM). Recommend [address] in males with score of 1 more. DOAC preferred. - Currently not on aspirin 81 mg daily. Recommend stop ASA and start apixaban 5 mg BID. While on [address], recommend annual monitoring of CMP and CBC.  - He is also on NSAID with H2B.   - Recommend rhythm control strategy as history of TCM. AF burden < 1%. Continue to monitor. Consider AA v abaltion in future. - Modifiable risk factors:  -- Obesity: BMI goal < 27. Recommend diet and exercise. -- OMID: sleep study 10/2020 without OMID.  -- HTN: BP goal < 130/80  -- EtOH: continue to avoid. - Follow-up with my office in 3 months. 2. typical CCW AFL sp DCCV x (2/14/12, 1/25/13, 12/13/13) and CTI RFA (12/13/13)  -See #1.    3. SND sp MDT dc PPM (dual coil ICD DOI: 10/27/11; downgrade to dc PPM with capping/abandoning of RV ICD lead: 4/28/17)  - In 7/2011, he was diagnosed with NICM and AF/AFL. Appears no attempt to restore SR was performed initially for unclear reason and LVEF remained reduced, so after 3 months of GDMT he was referred for MDT dc ICD. In 4/2017, battery reached FRANCISCA and Dr Radha Granados downgraded patient's device to dual chamber pacemaker with capping/abandoning of RV dual coil ICD lead. Presumably extraction was offered and declined by patient, but unclear. Additionally, unclear why the tachy therapies were not just turned off instead of capping/abandoning RV ICD lead and implanting new RV pacing lead as this results in device no longer being MRI conditional, particularly as he has a history of TIAs.   - Stable device function.  - Consider reprogramming to DDDR 60 - 130 bpm with AV delays of 270 msec sensed and 300 msec paced with sleep mode at 50 bpm starting 1 hour after typical bedtime and ending 1 hour before typically wakes in the future. 4. NYHA class II-III HFpEF-nonischemic/improved 2/2 AF/AFL sp MDT dc ICD (DOI: 10/27/11)  - Management per Dr Saúl Macario. 5. CAROLA thrombus (12/2011)  -In 12/2011, a LASHAUN reportedly found CAROLA thrombus, but the details of this are unavailable to me. A DCCV was performed in 2/2012, so presumably this CAROLA thrombus was resolved by that time. I attempted to contact the patient to discuss the above, but no answer, so VM. Please contact patient to schedule appointment.     Tyler Andrade DO  [id number] [facility] Cardiac Electrophysiology  [address] [facility]

## 2022-10-07 DIAGNOSIS — I42.0 DILATED CARDIOMYOPATHY (HCC): ICD-10-CM

## 2022-10-07 RX ORDER — SPIRONOLACTONE 25 MG/1
25 TABLET ORAL DAILY
Qty: 90 TABLET | Refills: 1 | OUTPATIENT
Start: 2022-10-07 | End: 2023-04-05

## 2022-11-17 DIAGNOSIS — E11.8 TYPE 2 DIABETES MELLITUS WITH COMPLICATION, WITHOUT LONG-TERM CURRENT USE OF INSULIN (HCC): Chronic | ICD-10-CM

## 2022-11-17 NOTE — TELEPHONE ENCOUNTER
Spoke to pharmacy , patient has refill there, spoke to patient's wife [place] and she will inform patient.

## 2023-01-06 DIAGNOSIS — I42.0 DILATED CARDIOMYOPATHY (HCC): ICD-10-CM

## 2023-01-06 RX ORDER — CARVEDILOL 12.5 MG/1
12.5 TABLET ORAL 2 TIMES DAILY WITH MEALS
Qty: 90 TABLET | Refills: 3 | Status: SHIPPED | OUTPATIENT
Start: 2023-01-06 | End: 2023-07-05

## 2023-01-24 RX ORDER — BACLOFEN 20 MG/1
20 TABLET ORAL 2 TIMES DAILY
Qty: 60 TABLET | Refills: 5 | Status: SHIPPED | OUTPATIENT
Start: 2023-01-24

## 2023-01-24 RX ORDER — DOXEPIN HYDROCHLORIDE 25 MG/1
25 CAPSULE ORAL NIGHTLY
Qty: 30 CAPSULE | Refills: 5 | Status: SHIPPED | OUTPATIENT
Start: 2023-01-24 | End: 2023-07-23

## 2023-02-01 RX ORDER — MONTELUKAST SODIUM 10 MG/1
TABLET ORAL
Qty: 30 TABLET | Refills: 3 | Status: SHIPPED | OUTPATIENT
Start: 2023-02-01

## 2023-03-01 ENCOUNTER — OFFICE VISIT (OUTPATIENT)
Dept: NEUROLOGY | Age: 63
End: 2023-03-01
Payer: MEDICARE

## 2023-03-01 VITALS
HEART RATE: 81 BPM | HEIGHT: 76 IN | BODY MASS INDEX: 29.83 KG/M2 | SYSTOLIC BLOOD PRESSURE: 139 MMHG | WEIGHT: 245 LBS | OXYGEN SATURATION: 97 % | DIASTOLIC BLOOD PRESSURE: 87 MMHG | TEMPERATURE: 97.3 F

## 2023-03-01 DIAGNOSIS — G44.221 CHRONIC TENSION-TYPE HEADACHE, INTRACTABLE: Primary | ICD-10-CM

## 2023-03-01 DIAGNOSIS — Z72.820 SLEEP DEPRIVATION: ICD-10-CM

## 2023-03-01 PROCEDURE — 99214 OFFICE O/P EST MOD 30 MIN: CPT | Performed by: NURSE PRACTITIONER

## 2023-03-01 RX ORDER — DOXEPIN HYDROCHLORIDE 100 MG/1
100 CAPSULE ORAL NIGHTLY
Qty: 30 CAPSULE | Refills: 5 | Status: SHIPPED | OUTPATIENT
Start: 2023-03-01 | End: 2023-08-28

## 2023-03-01 NOTE — PROGRESS NOTES
[address] [facility]. Sue Burnett M.D., F.A.C.P. Maple Boast, DNP, APRN, ACNS-BC  Tereso Wang. Kiera Brennan, MSN, APRN-FNP-C  Celina Romero, MSN, APRN-FNP-C  MADELIN Mayes, PA-C  Tammy Joseph, MSN, APRN-FNP-C  [address] [facility] [place] [address]  [place], [address]  Phone: [phone]  Fax: [phone]         Quinton Tuttle is a 60 y.o. right handed male     Patient follows for headaches and insomnia     Previously following with Dr. Sherine Zapata    Past medical history was remarkable for viral cardiomyopathy, with previous ejection fractions of only 20%. His ejection fraction was currently 50%. He previously suffered from intermittent atrial fibrillation. He reported non-insulin-dependent diabetes mellitus, hypertension, hyperlipidemia, gastroesophageal reflux disease and chronic obstructive lung disease. There were questionable embolic strokes from his viral cardiomyopathy. Past CT scans never confirmed this diagnosis. Began seeing Neurology > 7 years ago, with suspected tension type headaches. On baclofen 20 mg at night, with past moderate relief. He still noted an inability to maintain sleep. He requested additional medications. Sleep apnea was questioned but not proven by studies. He had seen another local neurologist who suspected migraines. He was prescribed low-dose topiramate, without effect. He again noted bilateral, daily, pressure sensations. There were no inciting or relieving events. .  He reported no other visual loss, bright lights, spinning sensations, speech difficulties, other pains or loss of consciousness. 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 Those difficulties had improved. His right eye remained intact. He denied other neurological events. He was eating well. He received his 2 COVID vaccines but refused the booster.   He was practicing proper pandemic precautions. Patient today has been taking Doxepin 25 mg nightly for sleep and notes it was working when he first started taking it but then it stopped working. He is weaning himself off Baclofen as he believes it is not working.       No chest pain or palpitations  No coughing or wheezing    No vertigo, lightheadedness or loss of consciousness  No falls, tripping or stumbling  No incontinence of bowels or bladder  No itching or bruising appreciated  No numbness, tingling or focal arm/leg weakness    ROS otherwise negative      Objective:     /87 (Site: Right Upper Arm)   Pulse 81   Temp 97.3 °F (36.3 °C)   Ht 6' 4\" (1.93 m)   Wt 245 lb (111.1 kg)   SpO2 97%   BMI 29.82 kg/m²   Afebrile     General appearance: alert, appears stated age, cooperative and no distress  Head: normocephalic, without obvious abnormality, atraumatic  Eyes: conjunctivae/corneas clear  Neck: supple, symmetrical, trachea midline   Lungs: clear to auscultation bilaterally  Heart: regular rate and rhythm, S1, S2 normal  Abdomen: soft, non-tender; bowel sounds normal  Extremities:  normal, atraumatic, no cyanosis or edema  Skin: color, texture, turgor normal---no rashes or lesions       Mental Status: Alert, oriented, thought content appropriate    Appropriate attention/concentration  Intact fundus of knowledge  Intact memories  Repetition intact    Speech: no dysarthria  Language: no aphasias       Cranial Nerves:  I: smell NA   II: visual acuity  NA   II: visual fields Full to confrontation   II: pupils DEIRDRE   III,VII: ptosis None   III,IV,VI: extraocular muscles  Full ROM   V: mastication Normal   V: facial light touch sensation  Normal   V,VII: corneal reflex     VII: facial muscle function - upper  Normal   VII: facial muscle function - lower Normal   VIII: hearing Normal   IX: soft palate elevation  Normal   IX,X: gag reflex    XI: trapezius strength  5/5   XI: sternocleidomastoid strength 5/5   XI: neck extension strength 5/5   XII: tongue strength  Normal     Motor:  5/5 throughout  Normal bulk and tone  No drift   No abnormal movements    Sensory:  LT normal    Coordination:   FN, FFM and NINA normal    Gait:  Normal    DTR:   1+ throughout     Laboratory/Radiology:     CBC with Differential:    Lab Results   Component Value Date/Time    WBC 11.5 09/16/2021 09:31 AM    RBC 4.89 09/16/2021 09:31 AM    HGB 15.6 09/16/2021 09:31 AM    HCT 45.7 09/16/2021 09:31 AM     09/16/2021 09:31 AM    MCV 93.5 09/16/2021 09:31 AM    MCH 31.9 09/16/2021 09:31 AM    MCHC 34.1 09/16/2021 09:31 AM    RDW 13.2 09/16/2021 09:31 AM    SEGSPCT 74 02/21/2014 12:30 PM    BANDSPCT 1 02/16/2015 05:00 AM    METASPCT 1 02/16/2015 05:00 AM    LYMPHOPCT 30.0 09/16/2021 09:31 AM    MONOPCT 9.2 09/16/2021 09:31 AM    BASOPCT 0.3 09/16/2021 09:31 AM    MONOSABS 1.06 09/16/2021 09:31 AM    LYMPHSABS 3.44 09/16/2021 09:31 AM    EOSABS 0.17 09/16/2021 09:31 AM    BASOSABS 0.04 09/16/2021 09:31 AM     CMP:    Lab Results   Component Value Date/Time     09/16/2021 09:31 AM    K 4.3 09/16/2021 09:31 AM     09/16/2021 09:31 AM    CO2 23 09/16/2021 09:31 AM    BUN 14 09/16/2021 09:31 AM    CREATININE 1.0 09/16/2021 09:31 AM    GFRAA >60 09/16/2021 09:31 AM    LABGLOM >60 09/16/2021 09:31 AM    GLUCOSE 170 09/16/2021 09:31 AM    GLUCOSE 112 05/08/2012 10:45 AM    PROT 7.0 09/16/2021 09:31 AM    LABALBU 4.3 09/16/2021 09:31 AM    LABALBU 3.9 07/20/2011 12:45 PM    CALCIUM 9.8 09/16/2021 09:31 AM    BILITOT 0.6 09/16/2021 09:31 AM    ALKPHOS 47 09/16/2021 09:31 AM    AST 25 09/16/2021 09:31 AM    ALT 30 09/16/2021 09:31 AM     Hepatic Function Panel:    Lab Results   Component Value Date/Time    ALKPHOS 47 09/16/2021 09:31 AM    ALT 30 09/16/2021 09:31 AM    AST 25 09/16/2021 09:31 AM    PROT 7.0 09/16/2021 09:31 AM    BILITOT 0.6 09/16/2021 09:31 AM    BILIDIR 0.4 07/18/2011 05:20 PM    LABALBU 4.3 09/16/2021 09:31 AM    LABALBU 3.9 07/20/2011 12:45 PM HgBA1c:    Lab Results   Component Value Date/Time    LABA1C 6.7 09/16/2022 10:15 AM     FLP:    Lab Results   Component Value Date/Time    TRIG 124 09/16/2021 09:31 AM    HDL 35 09/16/2021 09:31 AM    LDLCALC 64 09/16/2021 09:31 AM    LABVLDL 25 09/16/2021 09:31 AM     All labs and imaging studies reviewed independently today    Assessment:     Tension type headache    Sleep disorder  --- taking doxepin 25 mg nightly  --- will increase her dose    Plan:     Continue Doxepin to 100 mg nightly    Follow up in 3 months    Call with any questions or concerns       GHANSHYAM Wynn CNP  9:28 AM  3/1/2023

## 2023-03-07 DIAGNOSIS — E55.9 VITAMIN D DEFICIENCY: ICD-10-CM

## 2023-03-07 DIAGNOSIS — E78.5 DYSLIPIDEMIA: ICD-10-CM

## 2023-03-07 DIAGNOSIS — E11.8 TYPE 2 DIABETES MELLITUS WITH COMPLICATION, WITHOUT LONG-TERM CURRENT USE OF INSULIN (HCC): Chronic | ICD-10-CM

## 2023-03-07 LAB
ALBUMIN SERPL-MCNC: 4.2 G/DL (ref 3.5–5.2)
ALP BLD-CCNC: 63 U/L (ref 40–129)
ALT SERPL-CCNC: 28 U/L (ref 0–40)
ANION GAP SERPL CALCULATED.3IONS-SCNC: 12 MMOL/L (ref 7–16)
AST SERPL-CCNC: 23 U/L (ref 0–39)
BILIRUB SERPL-MCNC: 0.4 MG/DL (ref 0–1.2)
BUN BLDV-MCNC: 17 MG/DL (ref 6–23)
CALCIUM SERPL-MCNC: 9.5 MG/DL (ref 8.6–10.2)
CHLORIDE BLD-SCNC: 99 MMOL/L (ref 98–107)
CHOLESTEROL, TOTAL: 140 MG/DL (ref 0–199)
CO2: 27 MMOL/L (ref 22–29)
CREAT SERPL-MCNC: 1.1 MG/DL (ref 0.7–1.2)
GFR SERPL CREATININE-BSD FRML MDRD: >60 ML/MIN/1.73
GLUCOSE BLD-MCNC: 177 MG/DL (ref 74–99)
HDLC SERPL-MCNC: 36 MG/DL
LDL CHOLESTEROL CALCULATED: 68 MG/DL (ref 0–99)
POTASSIUM SERPL-SCNC: 4.2 MMOL/L (ref 3.5–5)
SODIUM BLD-SCNC: 138 MMOL/L (ref 132–146)
TOTAL PROTEIN: 6.8 G/DL (ref 6.4–8.3)
TRIGL SERPL-MCNC: 182 MG/DL (ref 0–149)
VITAMIN D 25-HYDROXY: 40 NG/ML (ref 30–100)
VLDLC SERPL CALC-MCNC: 36 MG/DL

## 2023-03-07 NOTE — PROGRESS NOTES
[facility] Internal Medicine      SUBJECTIVE:  Penny Cuevas (:  1960) is a 60 y.o. male here for evaluation of the following chief complaint(s):  Follow-up    58-year-old male with history of nonvalvular paroxysmal A-fib on Eliquis, HFpEF, EF 50%, TIA, type 2 diabetes, obesity, migraine/tension type headache, primary knee osteoarthritis presented today for routine follow-up. Hx of Non ischemic cardiomyopathy'    S/P downgrading of dual chamber ICD to dual chamber pacemaker 17  Last ECHO  EF 65% with Stage 1 diastolic dysfunction  On ASA, spironolactone  25 mg daily, Lisinopril 10mg QD, Coreg 12.5 BID, Digoxin 250mcg QD, Lasix 20mg QD. Stress test 2020- EF 60%, no reversible perfusion defect   Follows with Dr. Nikki Chester     Paroxysmal atrial flutter  S/P AV kamar ablation 2013  [id number] Score: 4  on Coreg 12.5 mg twice daily, Eliquis 5 mg twice daily. follows Dr Ashish FELDER      TYPE 2 DM  Hemoglobin A1C  7.7 2020 -> 7.6 2021 ->> 8.2 ->> 6.7 -> 7.2 today  Microalbumin crea ratio 2020 61.2   on Metformin 1000mg BID + linagliptin 5 mg daily + Farxiga 5 mg daily. He states his blood sugar is running high during the holiday season, patient stated he will try to work on diet, exercise  Patient does not check BG regularly at home. States he usually forget. No hypoglycemic event recently. Patient does not check eye/foot exam recently. Patient denies hypoglycemic symptoms, denies changes in vision/ no new tingling numbness or weakness. 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 Americo Abbott. Next appointment in 2023  On Eliquis and Statin     Chronic headache     Has tension type headache. He follows with neurology clinic. On doxepin 100 mg nightly for sleep. Patient denied tingling, weakness, numbness, change in his right vision(patient has left-sided blindness)  On  baclofen 20 mg. Patient states he wants to taper down baclofen because he said think it does not help     Gastroesophageal reflux disease, esophagitis presence not specified  States symptoms occasionally. Takes Pepcid 20 mg once daily. OA  Follows orthopedics. Patient states he never had knee replacement surgery before. Patient wants to refer to sports medicine clinic for further follow-up. Smoking  Smokes 1PPD since 27+ years; currently 1/2PPD   Pre contemplative at this time wants to stop it on new year  [place] cancer screening in April/2022 was negative    Dyslipidemia  Previous ASCVD: 18.1%  The 10-year ASCVD risk score (Carol GREENBERG, et al., 2019) is: 27.3%    Values used to calculate the score:      Age: 60 years      Sex: Male      Is Non- : No      Diabetic: Yes      Tobacco smoker: Yes      Systolic Blood Pressure: 981 mmHg      Is BP treated: No      HDL Cholesterol: 36 mg/dL      Total Cholesterol: 140 mg/dL  Lab Results   Component Value Date    CHOL 140 03/07/2023    CHOL 124 09/16/2021    CHOL 142 05/01/2020     Lab Results   Component Value Date    TRIG 182 (H) 03/07/2023    TRIG 124 09/16/2021    TRIG 108 05/01/2020     Lab Results   Component Value Date    HDL 36 03/07/2023    HDL 35 09/16/2021    HDL 39 05/01/2020     Lab Results   Component Value Date    LDLCALC 68 03/07/2023    LDLCALC 64 09/16/2021    LDLCALC 81 05/01/2020     Lab Results   Component Value Date    LABVLDL 36 03/07/2023    LABVLDL 25 09/16/2021    LABVLDL 22 05/01/2020     No results found for: CHOLHDLRATIO    on Atorvastatin 80mg QD     Review of Systems   Constitutional:  Negative for chills and fever. HENT:  Negative for congestion. Respiratory:  Negative for cough and shortness of breath. Cardiovascular:  Negative for chest pain, palpitations and leg swelling. Gastrointestinal:  Negative for abdominal pain, blood in stool, constipation, diarrhea, nausea and vomiting.    Musculoskeletal:  Negative for back pain and myalgias. Skin:  Negative for rash. Neurological:  Negative for dizziness and headaches. Psychiatric/Behavioral:  The patient is not nervous/anxious. Current Outpatient Medications on File Prior to Visit   Medication Sig Dispense Refill    doxepin (SINEQUAN) 100 MG capsule Take 1 capsule by mouth nightly 30 capsule 5    montelukast (SINGULAIR) 10 MG tablet TAKE 1 TABLET BY MOUTH EVERY DAY 30 tablet 3    baclofen (LIORESAL) 20 MG tablet Take 1 tablet by mouth 2 times daily 60 tablet 5    carvedilol (COREG) 12.5 MG tablet Take 1 tablet by mouth 2 times daily (with meals) 90 tablet 3    apixaban (ELIQUIS) 5 MG TABS tablet Take 1 tablet by mouth 2 times daily 60 tablet 5    dapagliflozin (FARXIGA) 5 MG tablet Take 1 tablet by mouth every morning 90 tablet 1    Misc. Devices MISC 1 each by Does not apply route once as needed (mask for nebulizer) Nebulizer and tubing. 1 each 0    lisinopril (PRINIVIL;ZESTRIL) 10 MG tablet Take 1 tablet by mouth daily 90 tablet 1    linagliptin (TRADJENTA) 5 MG tablet Take 1 tablet by mouth daily 90 tablet 1    atorvastatin (LIPITOR) 80 MG tablet Take 1 tablet by mouth daily take 1 tablet by mouth every evening 90 tablet 1    spironolactone (ALDACTONE) 25 MG tablet Take 1 tablet by mouth daily 90 tablet 1    famotidine (PEPCID) 20 MG tablet Take 1 tablet by mouth 2 times daily 180 tablet 1    fluticasone (FLONASE) 50 MCG/ACT nasal spray INHALE TWO SPRAYS INTO EACH NOSTRIL ONCE DAILY 16 g 1    albuterol (PROVENTIL) (2.5 MG/3ML) 0.083% nebulizer solution Take 3 mLs by nebulization every 6 hours as needed for Wheezing 120 each 1    Azelastine HCl 137 MCG/SPRAY SOLN INHALE INTO THE NOSTRIL TWO TIMES A DAY. USE IN EACH NOSTRIL AS DIRECTED 30 mL 1    furosemide (LASIX) 20 MG tablet Take 1 tablet by mouth in the morning.  90 tablet 1    metFORMIN (GLUCOPHAGE) 1000 MG tablet Take 1 tablet by mouth 2 times daily (with meals) 180 tablet 1    digoxin (DIGOX) 250 MCG tablet Take 1 tablet by mouth daily 90 tablet 3    albuterol sulfate HFA (PROVENTIL HFA) 108 (90 Base) MCG/ACT inhaler Inhale 2 puffs into the lungs every 6 hours as needed for Wheezing 18 g 5    Naproxen Sodium 220 MG CAPS Take by mouth       No current facility-administered medications on file prior to visit. OBJECTIVE:    VS:   Vitals:    03/08/23 0920   BP: 130/78   Site: Left Upper Arm   Position: Sitting   Cuff Size: Large Adult   Pulse: 85   Resp: 18   Temp: 97.8 °F (36.6 °C)   TempSrc: Temporal   SpO2: 98%   Weight: 252 lb (114.3 kg)   Height: 6' 3\" (1.905 m)     Physical Exam  Constitutional:       General: He is not in acute distress. Appearance: He is well-developed. HENT:      Head: Normocephalic and atraumatic. Eyes:      General: No scleral icterus. Conjunctiva/sclera: Conjunctivae normal.   Neck:      Trachea: No tracheal deviation. Cardiovascular:      Rate and Rhythm: Normal rate. Heart sounds: No murmur heard. Pulmonary:      Effort: Pulmonary effort is normal. No respiratory distress. Breath sounds: Normal breath sounds. Abdominal:      General: Bowel sounds are normal. There is no distension. Palpations: Abdomen is soft. Tenderness: There is no abdominal tenderness. Musculoskeletal:         General: Normal range of motion. Cervical back: Normal range of motion. Skin:     General: Skin is warm and dry. Neurological:      Mental Status: He is alert and oriented to person, place, and time. Psychiatric:         Behavior: Behavior normal.         Thought Content: Thought content normal.         Judgment: Judgment normal.          ASSESSMENT/PLAN:    2. Type 2 diabetes mellitus with complication, without long-term current use of insulin ([facility])   Hemoglobin A1c did trending up from 6.7-7.2. Discussed with patient. He states he will work on his diet, exercise and will recheck A1c at next visit.   We will keep the same current regimen  -     dapagliflozin (FARXIGA) 5 MG tablet; Take 1 tablet by mouth every morning, Disp-90 tablet, R-1Normal  -     linagliptin (TRADJENTA) 5 MG tablet; Take 1 tablet by mouth daily, Disp-90 tablet, R-1Normal  -     metFORMIN (GLUCOPHAGE) 1000 MG tablet; Take 1 tablet by mouth 2 times daily (with meals), Disp-180 tablet, R-1Normal  -     POCT glycosylated hemoglobin (Hb A1C)    3. Dilated cardiomyopathy (HCC)  -     lisinopril (PRINIVIL;ZESTRIL) 10 MG tablet; Take 1 tablet by mouth daily, Disp-90 tablet, R-1Normal  -     spironolactone (ALDACTONE) 25 MG tablet; Take 1 tablet by mouth daily, Disp-90 tablet, R-1Normal    4. Dyslipidemia  -     atorvastatin (LIPITOR) 80 MG tablet; Take 1 tablet by mouth daily take 1 tablet by mouth every evening, Disp-90 tablet, R-1Normal    5. Gastroesophageal reflux disease without esophagitis  -     famotidine (PEPCID) 20 MG tablet; Take 1 tablet by mouth 2 times daily, Disp-180 tablet, R-1Normal   Advised patient to take Pepcid as needed every other day and tapering down if symptoms are well controlled. 6. Bronchitis  -     albuterol (PROVENTIL) (2.5 MG/3ML) 0.083% nebulizer solution; Take 3 mLs by nebulization every 6 hours as needed for Wheezing, Disp-120 each, R-1Normal    7. Paroxysmal atrial fibrillation (HCC)   Follow with EP. Continue Eliquis 5 mg twice daily. Coreg 12.5 mg twice daily. 8. Cardiomyopathy, nonischemic ([facility] [address]    9. Primary osteoarthritis of both knees    Sent referral to sports medicine  -     Beatris Alexis MD, Sports Medicine, [address] [facility]    10. Tobacco abuse     Counseling on smoking cessation. Patient is not ready to quit   Will repeat lung cancer screening at next visit. RTC:  Return in about 4 months (around 7/8/2023) for follow up. I have reviewed my findings and recommendations with Monae Hawkins and Dr. Delisa Cornejo.     Karan Salinas MD   3/7/2023 1:58 PM

## 2023-03-08 ENCOUNTER — OFFICE VISIT (OUTPATIENT)
Dept: INTERNAL MEDICINE | Age: 63
End: 2023-03-08
Payer: MEDICARE

## 2023-03-08 VITALS
BODY MASS INDEX: 31.33 KG/M2 | WEIGHT: 252 LBS | HEIGHT: 75 IN | OXYGEN SATURATION: 98 % | SYSTOLIC BLOOD PRESSURE: 130 MMHG | HEART RATE: 85 BPM | RESPIRATION RATE: 18 BRPM | DIASTOLIC BLOOD PRESSURE: 78 MMHG | TEMPERATURE: 97.8 F

## 2023-03-08 DIAGNOSIS — Z72.0 TOBACCO ABUSE: ICD-10-CM

## 2023-03-08 DIAGNOSIS — E78.5 DYSLIPIDEMIA: ICD-10-CM

## 2023-03-08 DIAGNOSIS — Z95.810 S/P ICD (INTERNAL CARDIAC DEFIBRILLATOR) PROCEDURE: Primary | Chronic | ICD-10-CM

## 2023-03-08 DIAGNOSIS — J40 BRONCHITIS: ICD-10-CM

## 2023-03-08 DIAGNOSIS — K21.9 GASTROESOPHAGEAL REFLUX DISEASE WITHOUT ESOPHAGITIS: ICD-10-CM

## 2023-03-08 DIAGNOSIS — Z95.0 PACEMAKER: ICD-10-CM

## 2023-03-08 DIAGNOSIS — I42.0 DILATED CARDIOMYOPATHY (HCC): ICD-10-CM

## 2023-03-08 DIAGNOSIS — E11.8 TYPE 2 DIABETES MELLITUS WITH COMPLICATION, WITHOUT LONG-TERM CURRENT USE OF INSULIN (HCC): Chronic | ICD-10-CM

## 2023-03-08 DIAGNOSIS — I48.0 PAROXYSMAL ATRIAL FIBRILLATION (HCC): Chronic | ICD-10-CM

## 2023-03-08 DIAGNOSIS — I42.8 CARDIOMYOPATHY, NONISCHEMIC (HCC): Chronic | ICD-10-CM

## 2023-03-08 DIAGNOSIS — M17.0 PRIMARY OSTEOARTHRITIS OF BOTH KNEES: ICD-10-CM

## 2023-03-08 LAB — HBA1C MFR BLD: 7.2 %

## 2023-03-08 PROCEDURE — 99212 OFFICE O/P EST SF 10 MIN: CPT | Performed by: INTERNAL MEDICINE

## 2023-03-08 PROCEDURE — 83036 HEMOGLOBIN GLYCOSYLATED A1C: CPT | Performed by: INTERNAL MEDICINE

## 2023-03-08 RX ORDER — SPIRONOLACTONE 25 MG/1
25 TABLET ORAL DAILY
Qty: 90 TABLET | Refills: 1 | Status: SHIPPED | OUTPATIENT
Start: 2023-03-08 | End: 2023-09-04

## 2023-03-08 RX ORDER — FAMOTIDINE 20 MG/1
20 TABLET, FILM COATED ORAL 2 TIMES DAILY
Qty: 180 TABLET | Refills: 1 | Status: SHIPPED | OUTPATIENT
Start: 2023-03-08 | End: 2023-09-04

## 2023-03-08 RX ORDER — ALBUTEROL SULFATE 2.5 MG/3ML
2.5 SOLUTION RESPIRATORY (INHALATION) EVERY 6 HOURS PRN
Qty: 120 EACH | Refills: 1 | Status: SHIPPED | OUTPATIENT
Start: 2023-03-08

## 2023-03-08 RX ORDER — LISINOPRIL 10 MG/1
10 TABLET ORAL DAILY
Qty: 90 TABLET | Refills: 1 | Status: SHIPPED | OUTPATIENT
Start: 2023-03-08 | End: 2023-09-04

## 2023-03-08 RX ORDER — ATORVASTATIN CALCIUM 80 MG/1
80 TABLET, FILM COATED ORAL DAILY
Qty: 90 TABLET | Refills: 1 | Status: SHIPPED | OUTPATIENT
Start: 2023-03-08 | End: 2023-09-04

## 2023-03-08 SDOH — ECONOMIC STABILITY: FOOD INSECURITY: WITHIN THE PAST 12 MONTHS, THE FOOD YOU BOUGHT JUST DIDN'T LAST AND YOU DIDN'T HAVE MONEY TO GET MORE.: NEVER TRUE

## 2023-03-08 SDOH — ECONOMIC STABILITY: INCOME INSECURITY: HOW HARD IS IT FOR YOU TO PAY FOR THE VERY BASICS LIKE FOOD, HOUSING, MEDICAL CARE, AND HEATING?: NOT VERY HARD

## 2023-03-08 SDOH — ECONOMIC STABILITY: FOOD INSECURITY: WITHIN THE PAST 12 MONTHS, YOU WORRIED THAT YOUR FOOD WOULD RUN OUT BEFORE YOU GOT MONEY TO BUY MORE.: NEVER TRUE

## 2023-03-08 SDOH — ECONOMIC STABILITY: HOUSING INSECURITY
IN THE LAST 12 MONTHS, WAS THERE A TIME WHEN YOU DID NOT HAVE A STEADY PLACE TO SLEEP OR SLEPT IN A SHELTER (INCLUDING NOW)?: NO

## 2023-03-08 ASSESSMENT — ENCOUNTER SYMPTOMS
BACK PAIN: 0
BLOOD IN STOOL: 0
ABDOMINAL PAIN: 0
SHORTNESS OF BREATH: 0
DIARRHEA: 0
COUGH: 0
NAUSEA: 0
CONSTIPATION: 0
VOMITING: 0

## 2023-03-08 ASSESSMENT — PATIENT HEALTH QUESTIONNAIRE - PHQ9
SUM OF ALL RESPONSES TO PHQ QUESTIONS 1-9: 0
SUM OF ALL RESPONSES TO PHQ QUESTIONS 1-9: 0
2. FEELING DOWN, DEPRESSED OR HOPELESS: 0
SUM OF ALL RESPONSES TO PHQ QUESTIONS 1-9: 0
SUM OF ALL RESPONSES TO PHQ9 QUESTIONS 1 & 2: 0
SUM OF ALL RESPONSES TO PHQ QUESTIONS 1-9: 0
1. LITTLE INTEREST OR PLEASURE IN DOING THINGS: 0

## 2023-03-08 ASSESSMENT — LIFESTYLE VARIABLES
HOW OFTEN DO YOU HAVE A DRINK CONTAINING ALCOHOL: MONTHLY OR LESS
HOW MANY STANDARD DRINKS CONTAINING ALCOHOL DO YOU HAVE ON A TYPICAL DAY: 1 OR 2

## 2023-03-08 NOTE — PATIENT INSTRUCTIONS
Dear Denise Tyler,        Thank you for coming to your appointment today. I hope we have addressed all of your needs. Please make sure to do the following:  - Continue your medications as listed. - Referrals have been made to sport medicine: If you do not hear from the office in 1 week, please call the number listed. - We will see each other again in 4 months        Have a great day!         Sincerely,  Bijan Vaca M.D PGY-3  3/8/2023  10:42 AM

## 2023-03-08 NOTE — PROGRESS NOTES
[facility]  Internal Medicine Residency Clinic    Attending Physician Statement  I have discussed the case, including pertinent history and exam findings with the resident physician. I have seen and examined the patient and the key elements of the encounter have been performed by me. I agree with the assessment, plan and orders as documented by the resident. I have reviewed all pertinent PMHx, PSHx, FamHx, SocialHx, medications, and allergies and updated history as appropriate. Patient presents for routine follow up of medical problems. Patient has hx non-ischemic cardiomyopathy, s/p ICD and pacer, paroxysmal atrial fibrillation on apixaban,  DM, not on insulin (has slight increase in HBA1c from 6.5% to 7.2%), GERD, hyperlipidemia on statin (most recent LDL 68), and tobacco abuse. Patient has not been checking blood sugars and I discussed need for checking blood sugars regularly given increasing HBA1c. Patient also admits to dietary indiscretion especially over the holidays and knows that he needs to watch his diet more closely. Patient is a smoker but he states that he has been trying to cut down a dn has stopped in the past and will continue to strive to quit. Remainder of medical problems as per resident note.     Rowan Hernandez DO  3/8/2023 10:39 AM

## 2023-03-13 ENCOUNTER — TELEPHONE (OUTPATIENT)
Dept: ORTHOPEDIC SURGERY | Age: 63
End: 2023-03-13

## 2023-03-21 ENCOUNTER — OFFICE VISIT (OUTPATIENT)
Dept: ORTHOPEDIC SURGERY | Age: 63
End: 2023-03-21

## 2023-03-21 VITALS — HEIGHT: 75 IN | BODY MASS INDEX: 31.33 KG/M2 | WEIGHT: 252 LBS

## 2023-03-21 DIAGNOSIS — M25.561 CHRONIC PAIN OF RIGHT KNEE: Primary | ICD-10-CM

## 2023-03-21 DIAGNOSIS — G89.29 CHRONIC PAIN OF RIGHT KNEE: Primary | ICD-10-CM

## 2023-03-21 DIAGNOSIS — M17.11 PRIMARY OSTEOARTHRITIS OF RIGHT KNEE: ICD-10-CM

## 2023-03-21 NOTE — PROGRESS NOTES
tablet by mouth 2 times daily (with meals) 180 tablet 1    doxepin (SINEQUAN) 100 MG capsule Take 1 capsule by mouth nightly 30 capsule 5    montelukast (SINGULAIR) 10 MG tablet TAKE 1 TABLET BY MOUTH EVERY DAY 30 tablet 3    baclofen (LIORESAL) 20 MG tablet Take 1 tablet by mouth 2 times daily 60 tablet 5    carvedilol (COREG) 12.5 MG tablet Take 1 tablet by mouth 2 times daily (with meals) 90 tablet 3    apixaban (ELIQUIS) 5 MG TABS tablet Take 1 tablet by mouth 2 times daily 60 tablet 5    Misc. Devices MISC 1 each by Does not apply route once as needed (mask for nebulizer) Nebulizer and tubing. 1 each 0    fluticasone (FLONASE) 50 MCG/ACT nasal spray INHALE TWO SPRAYS INTO EACH NOSTRIL ONCE DAILY 16 g 1    Azelastine HCl 137 MCG/SPRAY SOLN INHALE INTO THE NOSTRIL TWO TIMES A DAY. USE IN EACH NOSTRIL AS DIRECTED 30 mL 1    furosemide (LASIX) 20 MG tablet Take 1 tablet by mouth in the morning. 90 tablet 1    digoxin (DIGOX) 250 MCG tablet Take 1 tablet by mouth daily 90 tablet 3    albuterol sulfate HFA (PROVENTIL HFA) 108 (90 Base) MCG/ACT inhaler Inhale 2 puffs into the lungs every 6 hours as needed for Wheezing 18 g 5     No current facility-administered medications for this visit.      ______________________________________________________________________    Physical Exam :    Vitals: Ht 6' 3\" (1.905 m)   Wt 252 lb (114.3 kg)   BMI 31.50 kg/m²   General Appearance: Nontoxic, awake, alert, and in no acute distress. Chest wall/Lung: Respirations regular and unlabored. No cyanosis. Heart: RRR, distal pulses intact. Neurologic: Alert&Oriented x3. Sensation grossly intact. No focal motor deficits detected. Musculokeletal: RIGHT Knee:  ROM: flexion to 130, extension to 0. Mild effusion. No obvious bony abnormality or ecchymosis.   TTP: ( + ) MJL, ( + ) LJL, ( - ) patellar tendon, ( - ) quadriceps tendon, ( + ) patellar facets  Special Tests: ( - ) Patellar apprehension, ( - ) patellar grind  Stable and

## 2023-03-27 ENCOUNTER — TELEPHONE (OUTPATIENT)
Dept: ORTHOPEDIC SURGERY | Age: 63
End: 2023-03-27

## 2023-03-27 NOTE — TELEPHONE ENCOUNTER
Called patient to schedule appointment with provider. Call back information was left for the office. Patient is scheduling for X1 gel injections, authorization received.

## 2023-03-29 ENCOUNTER — OFFICE VISIT (OUTPATIENT)
Dept: ORTHOPEDIC SURGERY | Age: 63
End: 2023-03-29

## 2023-03-29 VITALS — WEIGHT: 252 LBS | HEIGHT: 75 IN | BODY MASS INDEX: 31.33 KG/M2

## 2023-03-29 DIAGNOSIS — M17.11 PRIMARY OSTEOARTHRITIS OF RIGHT KNEE: Primary | ICD-10-CM

## 2023-03-29 NOTE — PROGRESS NOTES
PROCEDURE NOTE:     DIAGNOSIS       RIGHT knee osteoarthritis. PROCEDURE      RIGHT knee joint Durolane injection. PROCEDURAL PAUSE      Procedural pause conducted to verify: ?correct patient identity, procedure to be performed, and as applicable, correct side and site, correct patient position, and availability of implants, special equipment, or special requirements. PROCEDURE DETAILS      The procedure was carried out under sterile technique. Patient Position: ?Seated. ?      Injection/Aspiration:  An 22-gauge 1.5 -inch needle was advanced into the knee joint. 0 ccs of yellow serosanguinous fluid was aspirated, then 3mL of sodium hyaluronate 60mg/3mL (Durolane) was injected into the knee joint without difficulty. Postprocedure Care: The patient will avoid heavy exertion with the knee and avoid soaking the knee under water for two days. The patient will contact me with any problems related to the injection. PATIENT EDUCATION     Ready to learn, no apparent learning barriers were identified; learning preferences include listening. Explained diagnosis and treatment plan; patient expressed understanding of the content. INFORMED CONSENT     Discussed the risks, benefits, alternatives, and the necessity of other members of the healthcare team participating in the procedure. All questions answered and consent given. Following denial of allergy and review of potential side effects and complications including but not necessarily limited to infection, allergic reaction, local tissue breakdown, aseptic effusion potentially necessitating aspiration and corticosteroid injection, elevation of blood glucose, injury to soft tissue and/or nerves, and seizure, the patient indicated their understanding and agreed to proceed. FOLLOW UP     Follow up in 3 months.     Electronically signed by Haja Lee MD on 3/29/2023 at 12:24 PM

## 2023-04-04 ENCOUNTER — TELEPHONE (OUTPATIENT)
Dept: NEUROLOGY | Age: 63
End: 2023-04-04

## 2023-04-04 RX ORDER — DOXEPIN HYDROCHLORIDE 50 MG/1
100 CAPSULE ORAL NIGHTLY
Qty: 30 CAPSULE | Refills: 1 | Status: SHIPPED | OUTPATIENT
Start: 2023-04-04 | End: 2023-10-01

## 2023-04-04 NOTE — TELEPHONE ENCOUNTER
----- Message from [facility] sent at 4/4/2023  2:11 PM EDT -----  Regarding: Doxepin side effects   Contact: [phone]  Hello. I am having trouble with constipation. I would like to wean off the doxepin, and currently take 100 mg. I would like to get a prescription for 50 mg.  Maribell rogers,  Terri Cervantes

## 2023-04-05 NOTE — TELEPHONE ENCOUNTER
MA notified patient of Olga's response.   Electronically signed by Enrique Rodriguez MA on 4/5/2023 at 8:25 AM

## 2023-05-03 DIAGNOSIS — J40 BRONCHITIS: ICD-10-CM

## 2023-05-03 RX ORDER — ALBUTEROL SULFATE 90 UG/1
2 AEROSOL, METERED RESPIRATORY (INHALATION) EVERY 6 HOURS PRN
Qty: 18 G | Refills: 5 | Status: SHIPPED | OUTPATIENT
Start: 2023-05-03

## 2023-05-03 NOTE — TELEPHONE ENCOUNTER
Last Appointment:  3/8/2023  Future Appointments   Date Time Provider Pratik Garcia   6/5/2023  9:40 AM GHANSHYAM Huntley - CNP BDM Neuro Neurology -   7/28/2023  7:45 AM DO Nathaly Soria [facility]   7/28/2023 10:00 AM ANGELIC CARDENAS IM, RESIDENT J [facility]

## 2023-05-15 DIAGNOSIS — J30.9 ALLERGIC RHINITIS, UNSPECIFIED SEASONALITY, UNSPECIFIED TRIGGER: ICD-10-CM

## 2023-05-15 RX ORDER — FLUTICASONE PROPIONATE 50 MCG
SPRAY, SUSPENSION (ML) NASAL
Qty: 1 EACH | Refills: 2 | Status: SHIPPED | OUTPATIENT
Start: 2023-05-15

## 2023-06-02 ENCOUNTER — TELEPHONE (OUTPATIENT)
Dept: ENT CLINIC | Age: 63
End: 2023-06-02

## 2023-06-02 RX ORDER — MONTELUKAST SODIUM 10 MG/1
TABLET ORAL
Qty: 30 TABLET | Refills: 3 | Status: SHIPPED | OUTPATIENT
Start: 2023-06-02

## 2023-06-05 ENCOUNTER — OFFICE VISIT (OUTPATIENT)
Dept: NEUROLOGY | Age: 63
End: 2023-06-05
Payer: MEDICARE

## 2023-06-05 VITALS
HEIGHT: 75 IN | DIASTOLIC BLOOD PRESSURE: 70 MMHG | HEART RATE: 70 BPM | TEMPERATURE: 98.7 F | RESPIRATION RATE: 16 BRPM | OXYGEN SATURATION: 98 % | BODY MASS INDEX: 31.5 KG/M2 | SYSTOLIC BLOOD PRESSURE: 115 MMHG

## 2023-06-05 DIAGNOSIS — G44.221 CHRONIC TENSION-TYPE HEADACHE, INTRACTABLE: Primary | ICD-10-CM

## 2023-06-05 DIAGNOSIS — Z72.820 SLEEP DEPRIVATION: ICD-10-CM

## 2023-06-05 PROCEDURE — 99214 OFFICE O/P EST MOD 30 MIN: CPT | Performed by: NURSE PRACTITIONER

## 2023-06-05 RX ORDER — DOXEPIN HYDROCHLORIDE 25 MG/1
25 CAPSULE ORAL NIGHTLY
Qty: 30 CAPSULE | Refills: 0 | Status: SHIPPED | OUTPATIENT
Start: 2023-06-05

## 2023-06-05 RX ORDER — AMITRIPTYLINE HYDROCHLORIDE 50 MG/1
50 TABLET, FILM COATED ORAL NIGHTLY
Qty: 30 TABLET | Refills: 5 | Status: SHIPPED | OUTPATIENT
Start: 2023-07-05

## 2023-06-05 NOTE — PROGRESS NOTES
[address] [facility]. Lisy Tavares M.D., F.A.C.P. Lizz Waldron, DNP, APRN, ACNS-[facility] [place]. Isabel Gardner, MSN, APRN-FNP-C  Alivia Smith, MSN, APRN-FNP-C  MADELIN Betancourt, PA-C  Trinity Carmen, MSN, APRN-FNP-C  [address]' [place], [address] [address]  Phone: [phone]  Fax: [phone]         Meenakshi Thomas is a 60 y.o. right handed male     Patient follows for headaches and insomnia     Previously following with Dr. Gretel Gaona    Past medical history was remarkable for viral cardiomyopathy, with previous ejection fractions of only 20%. His ejection fraction was currently 50%. He previously suffered from intermittent atrial fibrillation. He reported non-insulin-dependent diabetes mellitus, hypertension, hyperlipidemia, gastroesophageal reflux disease and chronic obstructive lung disease. There were questionable embolic strokes from his viral cardiomyopathy. Past CT scans never confirmed this diagnosis. Began seeing Neurology > 7 years ago, with suspected tension type headaches. On baclofen 20 mg at night, with past moderate relief. He still noted an inability to maintain sleep. He requested additional medications. Sleep apnea was questioned but not proven by studies. He had seen another local neurologist who suspected migraines. He was prescribed low-dose topiramate, without effect. He again noted bilateral, daily, pressure sensations. There were no inciting or relieving events. .  He reported no other visual loss, bright lights, spinning sensations, speech difficulties, other pains or loss of consciousness. 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 Those difficulties had improved. His right eye remained intact. He denied other neurological events. He was eating well. He received his 2 COVID vaccines but refused the booster.   He was practicing proper pandemic

## 2023-07-12 DIAGNOSIS — E11.8 TYPE 2 DIABETES MELLITUS WITH COMPLICATION, WITHOUT LONG-TERM CURRENT USE OF INSULIN (HCC): Chronic | ICD-10-CM

## 2023-07-12 NOTE — TELEPHONE ENCOUNTER
----- Message from [facility] sent at 7/11/2023  6:41 PM EDT -----  Regarding: Metformin refill  Contact: [phone]  Hello, I need a refill of metformin and am not able to refill it on mychart.  Thank you

## 2023-07-28 ENCOUNTER — OFFICE VISIT (OUTPATIENT)
Dept: INTERNAL MEDICINE | Age: 63
End: 2023-07-28
Payer: MEDICARE

## 2023-07-28 VITALS
TEMPERATURE: 96.9 F | HEART RATE: 72 BPM | DIASTOLIC BLOOD PRESSURE: 75 MMHG | RESPIRATION RATE: 18 BRPM | SYSTOLIC BLOOD PRESSURE: 114 MMHG | HEIGHT: 76 IN | WEIGHT: 239.7 LBS | BODY MASS INDEX: 29.19 KG/M2

## 2023-07-28 DIAGNOSIS — Z72.0 TOBACCO ABUSE: ICD-10-CM

## 2023-07-28 DIAGNOSIS — E11.8 TYPE 2 DIABETES MELLITUS WITH COMPLICATION, WITHOUT LONG-TERM CURRENT USE OF INSULIN (HCC): Primary | ICD-10-CM

## 2023-07-28 DIAGNOSIS — I48.0 PAROXYSMAL ATRIAL FIBRILLATION (HCC): Chronic | ICD-10-CM

## 2023-07-28 LAB — HBA1C MFR BLD: 7.1 %

## 2023-07-28 PROCEDURE — 99212 OFFICE O/P EST SF 10 MIN: CPT

## 2023-07-28 PROCEDURE — 99214 OFFICE O/P EST MOD 30 MIN: CPT

## 2023-07-28 PROCEDURE — 83037 HB GLYCOSYLATED A1C HOME DEV: CPT

## 2023-07-28 PROCEDURE — 3051F HG A1C>EQUAL 7.0%<8.0%: CPT

## 2023-07-28 RX ORDER — POLYETHYLENE GLYCOL 3350 17 G
2 POWDER IN PACKET (EA) ORAL PRN
Qty: 100 EACH | Refills: 3 | Status: SHIPPED | OUTPATIENT
Start: 2023-07-28

## 2023-07-28 RX ORDER — NICOTINE 21 MG/24HR
1 PATCH, TRANSDERMAL 24 HOURS TRANSDERMAL DAILY
Qty: 30 PATCH | Refills: 0 | Status: SHIPPED | OUTPATIENT
Start: 2023-07-28 | End: 2023-08-27

## 2023-07-28 ASSESSMENT — ENCOUNTER SYMPTOMS
NAUSEA: 0
GASTROINTESTINAL NEGATIVE: 1
RESPIRATORY NEGATIVE: 1
WHEEZING: 0
ABDOMINAL PAIN: 0
VOMITING: 0

## 2023-07-28 NOTE — PROGRESS NOTES
[address] [facility] Internal Medicine      SUBJECTIVE:  Prashanth Kat (:  1960) is a 58 y.o. male here for evaluation of the following chief complaint(s):  Medication Refill, Diabetes ('s  states at least once a week), Headache (Constant  see's Neurologist for this ), Sleep Problem (Waking up throughout night doesn't get a total of 3 hrs of rest at one time), and Arthritis (Generalize pain)      Prashanth Kat is a 58 y.o. male with history of nonvalvular paroxysmal A-fib on Eliquis, HFpEF 50%, TIA, type 2 diabetes, obsecity, migraine/tension type headache, primary knee osteoarthritis presented today for 4 month routine f/u. Patient reported he is not taking amitriptyline (ELAVIL) 50MG. He woke up during night few times, sometime due to dreaming. He reported postnasal drip, which related to season. He makes his own cigarettes by himself. He would like nicotin patchy to help. He is doing well, no other complain otherwise. Review of Systems   Constitutional:  Negative for chills and fever. HENT:  Positive for postnasal drip. Respiratory: Negative. Negative for wheezing. Cardiovascular: Negative. Gastrointestinal: Negative. Negative for abdominal pain, nausea and vomiting. Musculoskeletal: Negative. Skin: Negative. Negative for rash. Neurological: Negative. Negative for dizziness and headaches. Psychiatric/Behavioral: Negative. Negative for confusion. The patient is not nervous/anxious.       Current Outpatient Medications on File Prior to Visit   Medication Sig Dispense Refill    metFORMIN (GLUCOPHAGE) 1000 MG tablet Take 1 tablet by mouth 2 times daily (with meals) 180 tablet 1    montelukast (SINGULAIR) 10 MG tablet TAKE 1 TABLET BY MOUTH EVERY DAY 30 tablet 3    fluticasone (FLONASE) 50 MCG/ACT nasal spray INHALE TWO SPRAYS INTO EACH NOSTRIL ONCE DAILY 1 each 2    albuterol sulfate HFA (PROVENTIL HFA) 108 (90 Base) MCG/ACT inhaler
[address] [facility]  Internal Medicine Residency Clinic    Attending Physician Statement  I have discussed the case, including pertinent history and exam findings with the resident physician. I have seen and examined the patient and the key elements of the encounter have been performed by me. I agree with the assessment, plan and orders as documented by the resident. I have reviewed the relevant PMHx, PSHx, FamHx, SocialHx, medications, and allergies and updated history as appropriate. Patient presents for routine follow up of medical problems. DM 2 controlled with A1c 7.1% with microalbuminuria  Continues on metformin, dapagliflozan, linagliptin  Check microalb/cr urine    Hypertension   Controlled on lisinopril, aldactone    Non-ICM with PAF   Continues Coreg, Eliquis, digoxin    OMID  Recent study and attempt to obtain results     Remainder of medical problems as per resident note.     Milla Magana DO  7/28/2023 10:16 AM
190

## 2023-07-28 NOTE — PATIENT INSTRUCTIONS
Dear Brandy Oglesby,    Thank you for coming to your appointment today. I hope we have addressed all of your needs. Please make sure to do the following:  - Continue your medications as listed. - Get tests before our next follow up. We will call you with the results   - We will see each other again in 11/10/2023    Call for a sooner appointment if you notice worsen of symptoms. Have a great day!         Sincerely,  Neri Nair MD  7/28/2023  10:45 AM

## 2023-08-01 ENCOUNTER — OFFICE VISIT (OUTPATIENT)
Dept: ENT CLINIC | Age: 63
End: 2023-08-01
Payer: MEDICARE

## 2023-08-01 VITALS
BODY MASS INDEX: 29.43 KG/M2 | DIASTOLIC BLOOD PRESSURE: 80 MMHG | WEIGHT: 241.7 LBS | SYSTOLIC BLOOD PRESSURE: 116 MMHG | HEIGHT: 76 IN | HEART RATE: 74 BPM

## 2023-08-01 DIAGNOSIS — J30.1 ALLERGIC RHINITIS DUE TO POLLEN, UNSPECIFIED SEASONALITY: Primary | ICD-10-CM

## 2023-08-01 PROCEDURE — 99213 OFFICE O/P EST LOW 20 MIN: CPT | Performed by: OTOLARYNGOLOGY

## 2023-08-01 RX ORDER — FLUTICASONE PROPIONATE 50 MCG
2 SPRAY, SUSPENSION (ML) NASAL DAILY
Qty: 1 EACH | Refills: 4 | Status: SHIPPED | OUTPATIENT
Start: 2023-08-01

## 2023-08-01 RX ORDER — MONTELUKAST SODIUM 10 MG/1
10 TABLET ORAL DAILY
Qty: 30 TABLET | Refills: 3 | Status: SHIPPED | OUTPATIENT
Start: 2023-08-01

## 2023-08-01 RX ORDER — AZELASTINE 1 MG/ML
2 SPRAY, METERED NASAL 2 TIMES DAILY
Qty: 30 ML | Refills: 1 | Status: SHIPPED | OUTPATIENT
Start: 2023-08-01

## 2023-08-02 NOTE — TELEPHONE ENCOUNTER
I called the patient's home number. He was sleeping. I spoke with his wife, Jesse Echeverria. I wanted to know if he was taking an [facility]. Jesse Echeverria states he is only taking an ASA. I asked if Sunitha Ross was going to reschedule is office appointment from 9/27/22. He left before being seen due to a prolonged wait time. She said they are not sure. I informed her a remote [facility] transmission showed an episode of atrial fib on 8/30/22. It lasted about one hour 25 minutes and occurred around 0021. Jesse Echeverria states she will speak with her  about rescheduling our appointment or finding another provider. She would like a phone call once the physician reviews today's transmission.     Gwen Padilla RN, BSN  [place] Detail Level: Detailed

## 2023-08-24 ENCOUNTER — TELEPHONE (OUTPATIENT)
Dept: CASE MANAGEMENT | Age: 63
End: 2023-08-24

## 2023-08-24 NOTE — TELEPHONE ENCOUNTER
I called the patient and he confirmed his CT lung screening at [address] on 8/25/2023 at 10:00 am.  I reminded the patient to arrive at 9:30 am, enter through the main entrance, and register. Patient confirmed.           Electronically signed by Bev Duran on 8/24/23 at 12:28 PM EDT

## 2023-08-25 ENCOUNTER — HOSPITAL ENCOUNTER (OUTPATIENT)
Dept: CT IMAGING | Age: 63
Discharge: HOME | End: 2023-08-25
Payer: MEDICARE

## 2023-08-25 DIAGNOSIS — Z72.0 TOBACCO ABUSE: ICD-10-CM

## 2023-08-25 PROCEDURE — 71271 CT THORAX LUNG CANCER SCR C-: CPT

## 2023-08-28 ENCOUNTER — TELEPHONE (OUTPATIENT)
Dept: CASE MANAGEMENT | Age: 63
End: 2023-08-28

## 2023-08-28 NOTE — TELEPHONE ENCOUNTER
No call, encounter opened to process CT Lung Screening. CT Lung Screen: 8/25/2023    IMPRESSION:  1. There is no pulmonary infiltrate, mass or suspicious pulmonary nodule. LUNG RADS:  Lung-RADS 1 - Negative ()     Management:  12 month screening LDCT     RECOMMENDATIONS:  If you would like to register your patient with the [facility] [place], please contact the Nurse Navigator at  [phone]. Pack years: 18.5    Social History     Tobacco Use  Smoking Status: Current Every Day Smoker    Start Date:    Quit Date:    Types: Cigarettes   Packs/Day: 0.5   Years: 40   Pack Years: 18.5   Smokeless Tobacco: Never         Results letter sent to patient via my chart or mailed.      [address]

## 2023-09-29 NOTE — TELEPHONE ENCOUNTER
Last Appointment:  Visit date not found  Future Appointments   Date Time Provider Pratik Garcia   8/4/2021 12:30 PM SCHEDULE, REMOTE CHECK FITO ELECTRO PHYS [facility]   8/25/2021 10:40 AM Edna Raya MD [facility] NEURO [facility]   9/27/2021  9:00 AM Eliana Bah MD [facility]   1/7/2022 10:00 AM DO Ronak Menardland ENT [facility] Results released to patient via [employer]. All labs are stable or at goal for him. Lipids are up but acceptable. To young to calculate 10 yr ASCVD risk.

## 2023-09-30 DIAGNOSIS — K21.9 GASTROESOPHAGEAL REFLUX DISEASE WITHOUT ESOPHAGITIS: ICD-10-CM

## 2023-10-02 RX ORDER — FAMOTIDINE 20 MG/1
20 TABLET, FILM COATED ORAL 2 TIMES DAILY
Qty: 180 TABLET | Refills: 1 | Status: SHIPPED | OUTPATIENT
Start: 2023-10-02 | End: 2024-03-30

## 2023-10-08 DIAGNOSIS — E11.8 TYPE 2 DIABETES MELLITUS WITH COMPLICATION, WITHOUT LONG-TERM CURRENT USE OF INSULIN (HCC): Chronic | ICD-10-CM

## 2023-10-09 NOTE — TELEPHONE ENCOUNTER
Last Appointment:  7/28/2023  Future Appointments   Date Time Provider [address]   11/10/2023 10:00 AM Mil Shea MD [facility]   8/2/2024  9:30 AM DO Nathaly Rodriges ENT [facility] filled 3/8/23 #90 one refill (180 days = 9/4/23)  Next appt 11/10/23 (32 days)

## 2023-10-18 NOTE — PROGRESS NOTES
Please call [facility]: [phone] ([place] or [place]) or [facility]: [phone] ([place], [facility]) to schedule your Brain MRI.    See [employer] [facility] report. Remote monitoring reviewed over a 90 day period. End of 90 day monitoring period date of service 9-      Make/Model Medtronic Radha GUTIERREZ. DOI 4/26/17  Mode MVP-R 70/130 ppm.   Presenting rhythm: SR, AsVs, HR ~90 bpm  P wave: 4 mV  Impedance: 361 ohms   Threshold: 0.75V @ 0.4 ms  RV R wave: 6.3 mV  Impedance: 361ohms   Threshold: 0.75 V @ 0.4 ms  Pacing: A: 68.9%  RV: 0.3%    Battery Voltage/Longevity:  3.01 V, 6 years (5-7.5 years)   Arrhythmias: AF burden 0%    Comment  -9/17/20 stress test: large fixed inferior defect probably d/t increased bowel uptake or diaphragmatic artifact.  EF 60%  -9/3/20 note (Dr Renu Montoya): recommended sleep study    Medications:  -Coreg 12.5 mg BID  -Lasix 20 mg QD  -Lisinopril 10 mg QD  -Aldactone 25 mg QD  -Digoxin 250 mcg QD  -ASA 81 mg QD    Plan:  -91 day remote transmission  -OV recall (scheduling)      GHANSHYAM Bansal-CNP, [facility]

## 2023-10-24 DIAGNOSIS — I42.0 DILATED CARDIOMYOPATHY (HCC): ICD-10-CM

## 2023-10-25 RX ORDER — SPIRONOLACTONE 25 MG/1
25 TABLET ORAL DAILY
Qty: 90 TABLET | Refills: 0 | Status: SHIPPED | OUTPATIENT
Start: 2023-10-25 | End: 2024-04-22

## 2023-10-31 DIAGNOSIS — E11.8 TYPE 2 DIABETES MELLITUS WITH COMPLICATION, WITHOUT LONG-TERM CURRENT USE OF INSULIN (HCC): ICD-10-CM

## 2023-10-31 LAB
CREATININE URINE: 112.5 MG/DL (ref 40–278)
MICROALBUMIN/CREAT 24H UR: 56 MG/L (ref 0–19)
MICROALBUMIN/CREAT UR-RTO: 50 MCG/MG CREAT (ref 0–30)

## 2023-11-02 DIAGNOSIS — E11.8 TYPE 2 DIABETES MELLITUS WITH COMPLICATION, WITHOUT LONG-TERM CURRENT USE OF INSULIN (HCC): Chronic | ICD-10-CM

## 2023-11-06 DIAGNOSIS — I48.0 PAF (PAROXYSMAL ATRIAL FIBRILLATION) (MULTI): Primary | ICD-10-CM

## 2023-11-06 DIAGNOSIS — I48.0 PAF (PAROXYSMAL ATRIAL FIBRILLATION) (MULTI): ICD-10-CM

## 2023-11-06 DIAGNOSIS — R07.9 CHEST PAIN, UNSPECIFIED TYPE: Primary | ICD-10-CM

## 2023-11-06 RX ORDER — CARVEDILOL 12.5 MG/1
12.5 TABLET ORAL
Qty: 180 TABLET | Refills: 0 | Status: SHIPPED | OUTPATIENT
Start: 2023-11-06 | End: 2024-02-22 | Stop reason: SDUPTHER

## 2023-11-06 RX ORDER — CARVEDILOL 12.5 MG/1
1 TABLET ORAL
COMMUNITY
Start: 2022-10-31 | End: 2023-11-06 | Stop reason: SDUPTHER

## 2023-11-06 NOTE — TELEPHONE ENCOUNTER
----- Message from Mundo Parham sent at 11/6/2023 10:28 AM EST -----  Regarding: Med Refill  Patient called for refill of Carvedilol/Coreg 12.5 mg Twice daily.  Please send to Haim Beaver. He does have a visit this week to see Ian Benavides.

## 2023-11-06 NOTE — PROGRESS NOTES
Patient needs Digoxin level (ordered by Dr. Valencia) and BMP (ordered by Dr. Marin) and both were ordered in [employer] computer system. Orders entered into epic.

## 2023-11-08 ENCOUNTER — LAB (OUTPATIENT)
Dept: LAB | Facility: LAB | Age: 63
End: 2023-11-08
Payer: COMMERCIAL

## 2023-11-08 ENCOUNTER — APPOINTMENT (OUTPATIENT)
Dept: CARDIOLOGY | Facility: CLINIC | Age: 63
End: 2023-11-08
Payer: COMMERCIAL

## 2023-11-08 DIAGNOSIS — I48.0 PAF (PAROXYSMAL ATRIAL FIBRILLATION) (MULTI): ICD-10-CM

## 2023-11-08 DIAGNOSIS — R07.9 CHEST PAIN, UNSPECIFIED TYPE: ICD-10-CM

## 2023-11-08 LAB
ANION GAP SERPL CALC-SCNC: 11 MMOL/L (ref 10–20)
BUN SERPL-MCNC: 17 MG/DL (ref 6–23)
CALCIUM SERPL-MCNC: 9.3 MG/DL (ref 8.6–10.3)
CHLORIDE SERPL-SCNC: 104 MMOL/L (ref 98–107)
CO2 SERPL-SCNC: 26 MMOL/L (ref 21–32)
CREAT SERPL-MCNC: 1.02 MG/DL (ref 0.5–1.3)
DIGOXIN SERPL-MCNC: 1.34 NG/ML (ref 0.8–?)
GFR SERPL CREATININE-BSD FRML MDRD: 82 ML/MIN/1.73M*2
GLUCOSE SERPL-MCNC: 178 MG/DL (ref 74–99)
POTASSIUM SERPL-SCNC: 4.2 MMOL/L (ref 3.5–5.3)
SODIUM SERPL-SCNC: 137 MMOL/L (ref 136–145)

## 2023-11-08 PROCEDURE — 80048 BASIC METABOLIC PNL TOTAL CA: CPT

## 2023-11-08 PROCEDURE — 36415 COLL VENOUS BLD VENIPUNCTURE: CPT

## 2023-11-08 PROCEDURE — 80162 ASSAY OF DIGOXIN TOTAL: CPT

## 2023-11-09 NOTE — PROGRESS NOTES
Medicare Annual Wellness Visit    Rachel Jolly is here for Medicare AWV, Diabetes, and Medication Refill    Assessment & Plan   Medicare annual wellness visit, subsequent  Dyslipidemia  -     atorvastatin (LIPITOR) 80 MG tablet; Take 1 tablet by mouth daily take 1 tablet by mouth every evening, Disp-90 tablet, R-1Normal  Dilated cardiomyopathy (HCC)  -     lisinopril (PRINIVIL;ZESTRIL) 10 MG tablet; Take 1 tablet by mouth daily, Disp-90 tablet, R-1Normal  Type 2 diabetes mellitus with complication, without long-term current use of insulin ([facility])  -      DIABETES FOOT EXAM  -     dapagliflozin (FARXIGA) 5 MG tablet; Take 1 tablet by mouth every morning, Disp-90 tablet, R-1Normal  -     linagliptin (TRADJENTA) 5 MG tablet; Take 1 tablet by mouth daily, Disp-90 tablet, R-1Normal  -     metFORMIN (GLUCOPHAGE) 1000 MG tablet; Take 1 tablet by mouth 2 times daily (with meals), Disp-180 tablet, R-1Normal  -     POCT glycosylated hemoglobin (Hb A1C)    Type 2 diabetes mellitus with complication, without long-term current use of insulin  - HbA1c 7/28/23 was 7.1 > 7.2 on 3/8/2023  - microalbumin 56, Cr urine 112.5, Microalb/Cr ratio 50  - Dapagliflozin (FARXIGA) 5MG tablet once daily  - linagliptin (TRADJENTA) 5MG tablet once daily  - Metformin 1000mg tablet BID    Headache  Tried Imitrex, topomax, baclofen, not helping  Per patient: will schedule visit with Neurologist at [address] 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    - F/u with eye doctor    Allergic rhinitis due to pollen  Follow with ENT Dr. Charly Mejia      Dilated cardiomyopathy  - BP today   - lisinopril (PRINIVIL; ZESTRIL) 10mg once daily  - spironolactone (ALDACTONE) 25mg once daily    Dyslipidemia  -Triglycerides 182 on 3/7/23 > 124 on 9/16/21  - Advise continue exercise, and keep healthy eating and drinking habits  -atorvastatin (LIPITOR) 80mg once daily    Gastroesophageal reflux disease

## 2023-11-10 ENCOUNTER — OFFICE VISIT (OUTPATIENT)
Dept: INTERNAL MEDICINE | Age: 63
End: 2023-11-10
Payer: MEDICARE

## 2023-11-10 VITALS
SYSTOLIC BLOOD PRESSURE: 101 MMHG | TEMPERATURE: 97.2 F | HEART RATE: 77 BPM | RESPIRATION RATE: 20 BRPM | DIASTOLIC BLOOD PRESSURE: 69 MMHG | BODY MASS INDEX: 29.57 KG/M2 | HEIGHT: 76 IN | OXYGEN SATURATION: 97 % | WEIGHT: 242.8 LBS

## 2023-11-10 DIAGNOSIS — E11.8 TYPE 2 DIABETES MELLITUS WITH COMPLICATION, WITHOUT LONG-TERM CURRENT USE OF INSULIN (HCC): Chronic | ICD-10-CM

## 2023-11-10 DIAGNOSIS — Z00.00 MEDICARE ANNUAL WELLNESS VISIT, SUBSEQUENT: Primary | ICD-10-CM

## 2023-11-10 DIAGNOSIS — I42.0 DILATED CARDIOMYOPATHY (HCC): ICD-10-CM

## 2023-11-10 DIAGNOSIS — E78.5 DYSLIPIDEMIA: ICD-10-CM

## 2023-11-10 LAB — HBA1C MFR BLD: 7.1 %

## 2023-11-10 PROCEDURE — 83036 HEMOGLOBIN GLYCOSYLATED A1C: CPT

## 2023-11-10 PROCEDURE — 3051F HG A1C>EQUAL 7.0%<8.0%: CPT

## 2023-11-10 PROCEDURE — 90686 IIV4 VACC NO PRSV 0.5 ML IM: CPT | Performed by: INTERNAL MEDICINE

## 2023-11-10 PROCEDURE — G0009 ADMIN PNEUMOCOCCAL VACCINE: HCPCS | Performed by: INTERNAL MEDICINE

## 2023-11-10 PROCEDURE — 99212 OFFICE O/P EST SF 10 MIN: CPT

## 2023-11-10 PROCEDURE — G0439 PPPS, SUBSEQ VISIT: HCPCS

## 2023-11-10 RX ORDER — LISINOPRIL 10 MG/1
10 TABLET ORAL DAILY
Qty: 90 TABLET | Refills: 1 | Status: SHIPPED | OUTPATIENT
Start: 2023-11-10 | End: 2024-05-08

## 2023-11-10 RX ORDER — ATORVASTATIN CALCIUM 80 MG/1
80 TABLET, FILM COATED ORAL DAILY
Qty: 90 TABLET | Refills: 1 | Status: SHIPPED | OUTPATIENT
Start: 2023-11-10 | End: 2024-05-08

## 2023-11-10 ASSESSMENT — LIFESTYLE VARIABLES: HOW OFTEN DO YOU HAVE A DRINK CONTAINING ALCOHOL: NEVER

## 2023-11-10 NOTE — PATIENT INSTRUCTIONS
Deadimitrios Wilder Leader,        Thank you for coming to your appointment today. I hope we have addressed all of your needs. Please make sure to do the following:  - Continue your medications as listed. - Get labs drawn before our next follow up. We will call you with the results       Have a great day! Sincerely,  Rabia Morales MD  11/10/2023  11:58 AM        Advance Directives: Care Instructions  Overview  An advance directive is a legal way to state your wishes at the end of your life. It tells your family and your doctor what to do if you can't say what you want. There are two main types of advance directives. You can change them any time your wishes change. Living will. This form tells your family and your doctor your wishes about life support and other treatment. The form is also called a declaration. Medical power of . This form lets you name a person to make treatment decisions for you when you can't speak for yourself. This person is called a health care agent (health care proxy, health care surrogate). The form is also called a durable power of  for health care. If you do not have an advance directive, decisions about your medical care may be made by a family member, or by a doctor or a  who doesn't know you. It may help to think of an advance directive as a gift to the people who care for you. If you have one, they won't have to make tough decisions by themselves. For more information, including forms for your state, see the [address] [place] [address] website (www.caringinfo.org/planning/advance-directives/). Follow-up care is a key part of your treatment and safety. Be sure to make and go to all appointments, and call your doctor if you are having problems. It's also a good idea to know your test results and keep a list of the medicines you take. What should you include in an advance directive? Many states have a unique advance directive form.  (It may ask you to address

## 2023-11-14 ENCOUNTER — HOSPITAL ENCOUNTER (OUTPATIENT)
Dept: RADIOLOGY | Facility: HOSPITAL | Age: 63
Discharge: HOME | End: 2023-11-14
Payer: COMMERCIAL

## 2023-11-14 VITALS
HEART RATE: 59 BPM | SYSTOLIC BLOOD PRESSURE: 120 MMHG | BODY MASS INDEX: 29.22 KG/M2 | DIASTOLIC BLOOD PRESSURE: 75 MMHG | RESPIRATION RATE: 18 BRPM | WEIGHT: 240 LBS | HEIGHT: 76 IN | OXYGEN SATURATION: 97 %

## 2023-11-14 DIAGNOSIS — R42 DIZZINESS AND GIDDINESS: ICD-10-CM

## 2023-11-14 DIAGNOSIS — R07.9 CHEST PAIN, UNSPECIFIED: ICD-10-CM

## 2023-11-14 DIAGNOSIS — I48.0 PAROXYSMAL ATRIAL FIBRILLATION (MULTI): ICD-10-CM

## 2023-11-14 PROCEDURE — 75574 CT ANGIO HRT W/3D IMAGE: CPT | Mod: ASTAT

## 2023-11-14 PROCEDURE — 75574 CT ANGIO HRT W/3D IMAGE: CPT | Mod: ASTAT | Performed by: INTERNAL MEDICINE

## 2023-11-14 PROCEDURE — 2500000001 HC RX 250 WO HCPCS SELF ADMINISTERED DRUGS (ALT 637 FOR MEDICARE OP)

## 2023-11-14 PROCEDURE — 2550000001 HC RX 255 CONTRASTS: Performed by: INTERNAL MEDICINE

## 2023-11-14 RX ORDER — NITROGLYCERIN 0.3 MG/1
TABLET SUBLINGUAL AS NEEDED
Status: DISCONTINUED | OUTPATIENT
Start: 2023-11-14 | End: 2023-11-15 | Stop reason: HOSPADM

## 2023-11-14 RX ORDER — NITROGLYCERIN 0.4 MG/1
0.4 TABLET SUBLINGUAL ONCE
Status: CANCELLED | OUTPATIENT
Start: 2023-11-14

## 2023-11-14 RX ADMIN — NITROGLYCERIN 0.4 MG: 0.3 TABLET SUBLINGUAL at 12:25

## 2023-11-14 RX ADMIN — IOHEXOL 100 ML: 350 INJECTION, SOLUTION INTRAVENOUS at 12:30

## 2023-11-14 ASSESSMENT — PAIN - FUNCTIONAL ASSESSMENT: PAIN_FUNCTIONAL_ASSESSMENT: 0-10

## 2023-11-14 ASSESSMENT — PAIN SCALES - GENERAL: PAINLEVEL_OUTOF10: 2

## 2023-11-16 RX ORDER — METFORMIN HYDROCHLORIDE 1000 MG/1
1000 TABLET ORAL
COMMUNITY
Start: 2023-11-10 | End: 2024-05-08

## 2023-11-16 RX ORDER — RIVAROXABAN 20 MG/1
20 TABLET, FILM COATED ORAL
COMMUNITY
Start: 2022-10-31 | End: 2024-02-27 | Stop reason: ALTCHOICE

## 2023-11-16 RX ORDER — FAMOTIDINE 20 MG/1
1 TABLET, FILM COATED ORAL NIGHTLY
COMMUNITY
Start: 2022-10-31

## 2023-11-16 RX ORDER — MONTELUKAST SODIUM 10 MG/1
1 TABLET ORAL DAILY
COMMUNITY
Start: 2022-10-31

## 2023-11-16 RX ORDER — ALBUTEROL SULFATE 90 UG/1
2 AEROSOL, METERED RESPIRATORY (INHALATION) EVERY 6 HOURS PRN
COMMUNITY
Start: 2023-05-03

## 2023-11-16 RX ORDER — DIGOXIN 250 MCG
125 TABLET ORAL DAILY
COMMUNITY
Start: 2022-06-15 | End: 2023-12-21 | Stop reason: SDUPTHER

## 2023-11-16 RX ORDER — AMITRIPTYLINE HYDROCHLORIDE 50 MG/1
TABLET, FILM COATED ORAL
COMMUNITY
Start: 2023-06-05 | End: 2024-02-27 | Stop reason: ALTCHOICE

## 2023-11-16 RX ORDER — FLUTICASONE PROPIONATE 50 MCG
2 SPRAY, SUSPENSION (ML) NASAL DAILY
COMMUNITY
Start: 2022-10-31

## 2023-11-16 RX ORDER — AZELASTINE 1 MG/ML
2 SPRAY, METERED NASAL
COMMUNITY
Start: 2022-07-26

## 2023-11-16 RX ORDER — SPIRONOLACTONE 25 MG/1
25 TABLET ORAL 2 TIMES DAILY
COMMUNITY
Start: 2022-10-31

## 2023-11-16 RX ORDER — BACLOFEN 20 MG/1
TABLET ORAL
COMMUNITY
Start: 2022-10-31 | End: 2023-11-21 | Stop reason: WASHOUT

## 2023-11-16 RX ORDER — IBUPROFEN 600 MG/1
1 TABLET ORAL 3 TIMES DAILY PRN
COMMUNITY
Start: 2022-10-31 | End: 2024-02-27 | Stop reason: ALTCHOICE

## 2023-11-16 RX ORDER — ALBUTEROL SULFATE 0.83 MG/ML
3 SOLUTION RESPIRATORY (INHALATION) EVERY 6 HOURS PRN
COMMUNITY
Start: 2022-10-31

## 2023-11-16 RX ORDER — ATORVASTATIN CALCIUM 80 MG/1
1 TABLET, FILM COATED ORAL NIGHTLY
COMMUNITY
Start: 2022-10-31

## 2023-11-16 RX ORDER — APIXABAN 5 MG/1
TABLET, FILM COATED ORAL
COMMUNITY
Start: 2023-08-17 | End: 2023-11-21 | Stop reason: SINTOL

## 2023-11-16 RX ORDER — FUROSEMIDE 20 MG/1
20 TABLET ORAL DAILY
COMMUNITY
Start: 2022-10-31 | End: 2024-01-19 | Stop reason: SDUPTHER

## 2023-11-16 RX ORDER — LISINOPRIL 10 MG/1
10 TABLET ORAL
COMMUNITY

## 2023-11-16 RX ORDER — DAPAGLIFLOZIN 5 MG/1
1 TABLET, FILM COATED ORAL
COMMUNITY
Start: 2022-10-31

## 2023-11-16 RX ORDER — METOPROLOL TARTRATE 50 MG/1
50 TABLET ORAL
COMMUNITY
Start: 2023-08-17 | End: 2023-11-21 | Stop reason: SDUPTHER

## 2023-11-20 NOTE — PROGRESS NOTES
"Counseling:  The patient was counseled regarding diagnostic results, instructions for management, risk factor reductions, prognosis, patient and family education, impressions, risks and benefits of treatment options and importance of compliance with treatment.      Chief Complaint:   The patient presents today for 8-month followup of chest pain s/p CTA coronary arteries.       History Of Present Illness:    Gary Shi is a 62 year old male patient who presents today for 8-month followup of chest pain s/p CTA coronary arteries. His PMH is significant for paroxysmal atrial fibrillation.  CTA of the coronary arteries performed 11/14/2023 revealed normal coronary anatomy, elevated calcium score of 154.52, soft plaque with 20% stenosis seen in the mid to distal left main as well as ostial RCA and mild nonobstructive coronary artery disease. Today, the patient reports persistent chest pain that comes and goes. He denies any exertional symptoms. He is scheduled for a PPM interrogation on 12/14/2023. He reports sleep disturbance and headaches, and requests referral to neurology. Per the patient, when he starts to feel unwell, he will discontinue all of his medications for a day or two to \"clear things out.\" The patient is compliant with his prescribed medications.      Last Recorded Vitals:  Vitals:    11/21/23 1005   BP: 116/58   BP Location: Left arm   Pulse: 81   Weight: 108 kg (239 lb)   Height: 1.93 m (6' 4\")       Past Surgical History:  He has a past surgical history that includes CT angio coronary art with heartflow if score >30% (11/14/2023).      Social History:  He reports that he has been smoking cigarettes. He has been smoking an average of .5 packs per day. He has never used smokeless tobacco. He reports current alcohol use. He reports that he does not use drugs.    Family History:  No family history on file.     Allergies:  Antibiotic [wjccu-tdcqs-pswmhrg-pramoxine]    Outpatient Medications:  Current " Outpatient Medications   Medication Instructions    albuterol 2.5 mg /3 mL (0.083 %) nebulizer solution 3 mL, nebulization, Every 6 hours PRN    albuterol 90 mcg/actuation inhaler 2 puffs, inhalation, Every 6 hours PRN    amitriptyline (Elavil) 50 mg tablet     atorvastatin (Lipitor) 80 mg tablet 1 tablet, oral, Nightly    azelastine (Astelin) 137 mcg (0.1 %) nasal spray 2 sprays, nasal    baclofen (Lioresal) 20 mg tablet oral    carvedilol (COREG) 12.5 mg, oral, 2 times daily with meals    dapagliflozin propanediol (Farxiga) 5 mg 1 tablet, oral, Daily before breakfast    digoxin (LANOXIN) 250 mcg, oral, Daily    Eliquis 5 mg tablet     famotidine (Pepcid) 20 mg tablet 1 tablet, oral, Nightly    fluticasone (Flonase) 50 mcg/actuation nasal spray 2 sprays, Each Nostril, Daily    furosemide (LASIX) 20 mg, oral, Daily    ibuprofen (IBU) 600 mg tablet 1 tablet, oral, 3 times daily PRN    linaGLIPtin (Tradjenta) 5 mg tablet 1 tablet, oral, Daily    lisinopril 10 mg, oral, Daily RT    metFORMIN (GLUCOPHAGE) 1,000 mg, oral    metoprolol tartrate (LOPRESSOR) 50 mg    montelukast (Singulair) 10 mg tablet 1 tablet, oral, Daily    spironolactone (ALDACTONE) 25 mg, oral, 2 times daily    Xarelto 20 mg, oral     Review of Systems   Constitutional:        Sleep disturbance   Cardiovascular:  Positive for chest pain.   Neurological:  Positive for headaches.   All other systems reviewed and are negative.     Physical Exam:  Constitutional:       Appearance: Healthy appearance. Not in distress.   Neck:      Vascular: No JVR. JVD normal.   Pulmonary:      Effort: Pulmonary effort is normal.      Breath sounds: Normal breath sounds. No wheezing. No rhonchi. No rales.   Chest:      Chest wall: Not tender to palpatation.   Cardiovascular:      PMI at left midclavicular line. Normal rate. Regular rhythm. Normal S1. Normal S2.       Murmurs: There is no murmur.      No gallop.  No click. No rub.   Pulses:     Intact distal pulses.    Edema:     Peripheral edema absent.   Abdominal:      General: Bowel sounds are normal.      Palpations: Abdomen is soft.      Tenderness: There is no abdominal tenderness.   Musculoskeletal: Normal range of motion.         General: No tenderness. Skin:     General: Skin is warm and dry.   Neurological:      General: No focal deficit present.      Mental Status: Alert and oriented to person, place and time.       Last Labs:  CBC -  Lab Results   Component Value Date    WBC 10.0 10/31/2022    HGB 16.1 10/31/2022    HCT 46.8 10/31/2022    MCV 95 10/31/2022     10/31/2022       CMP -  Lab Results   Component Value Date    CALCIUM 9.3 11/08/2023    PROT 6.7 10/31/2022    ALBUMIN 4.2 10/31/2022    AST 20 10/31/2022    ALT 25 10/31/2022    ALKPHOS 53 10/31/2022    BILITOT 0.5 10/31/2022       RENAL FUNCTION PANEL -   Lab Results   Component Value Date    GLUCOSE 178 (H) 11/08/2023     11/08/2023    K 4.2 11/08/2023     11/08/2023    CO2 26 11/08/2023    ANIONGAP 11 11/08/2023    BUN 17 11/08/2023    CREATININE 1.02 11/08/2023    GFRMALE 67 10/31/2022    CALCIUM 9.3 11/08/2023    ALBUMIN 4.2 10/31/2022        Lab Results   Component Value Date    HGBA1C 7.6 (H) 09/16/2021       Last Cardiology Tests:  11/14/2023 - CTA Coronary Arteries  1.  Normal coronary anatomy. Calcium score as above. Diagonal 1 and posterolateral branch of the RCA both of which are small caliber vessels were not opacified well. Soft plaque with 20% stenosis seen in the mid to distal left main as well as ostial RCA.   2. Mild nonobstructive coronary artery disease in rest of the coronaries.  3. Presence of cardiac pacemaker, dual-chamber.    Lab review: I have personally reviewed the laboratory result(s).  Diagnostic review: I have personally reviewed the result(s) of the CTA Coronary Arteries.     Assessment/Plan   1) Chest Pain in a patient with risk factors for CAD  On atorvastatin 80 mg daily, carvedilol 12.5 mg BID, digoxin  "250 mcg daily, furosemide 20 mg daily, lisinopril 10 mg daily, Xarelto 20 mg daily.  Apparently stress test last year was negative  Lipid panel 03/2023 with LDL of 68, elevated triglycerides of 182  BMP drawn 11/08/2023 was unremarkable   CTA coronary arteries 11/14/2023 with normal coronary anatomy, elevated calcium score of 154.52, soft plaque with 20% stenosis seen in the mid to distal left main as well as ostial RCA and mild nonobstructive CAD.  Persistent intermittent chest discomfort  Denies exertional symptoms  Patient states that he will occasionally discontinue all medications for a day or two when he feels unwell to \"clear things out\" - educated on the risks associated with suddenly stopping all medications abruptly such as stroke or blood clots.   Secondary risk factor modification   Repeat echo     2) Atrial Fib s/p PPM  As per EP   Persistent intermittent chest discomfort - ? A-fib   Scheduled for PPM interrogation 12/14/2023  Referral placed to EP for followup     3) Headaches, Sleep Disturbance  Referral placed to neurology as per patient's request      Scribe Attestation  By signing my name below, I, Jazmín Diaz, [place]   attest that this documentation has been prepared under the direction and in the presence of Eric Marin MD.   "

## 2023-11-21 ENCOUNTER — OFFICE VISIT (OUTPATIENT)
Dept: CARDIOLOGY | Facility: CLINIC | Age: 63
End: 2023-11-21
Payer: COMMERCIAL

## 2023-11-21 VITALS
HEART RATE: 81 BPM | DIASTOLIC BLOOD PRESSURE: 58 MMHG | BODY MASS INDEX: 29.1 KG/M2 | WEIGHT: 239 LBS | SYSTOLIC BLOOD PRESSURE: 116 MMHG | HEIGHT: 76 IN

## 2023-11-21 DIAGNOSIS — I25.10 CORONARY ARTERY DISEASE INVOLVING NATIVE CORONARY ARTERY OF NATIVE HEART WITHOUT ANGINA PECTORIS: Primary | ICD-10-CM

## 2023-11-21 DIAGNOSIS — I48.0 PAROXYSMAL ATRIAL FIBRILLATION (MULTI): ICD-10-CM

## 2023-11-21 DIAGNOSIS — I42.9 CARDIOMYOPATHY, UNSPECIFIED TYPE (MULTI): ICD-10-CM

## 2023-11-21 PROCEDURE — 99213 OFFICE O/P EST LOW 20 MIN: CPT | Performed by: INTERNAL MEDICINE

## 2023-11-21 PROCEDURE — 93000 ELECTROCARDIOGRAM COMPLETE: CPT | Performed by: INTERNAL MEDICINE

## 2023-11-21 ASSESSMENT — ENCOUNTER SYMPTOMS
LOSS OF SENSATION IN FEET: 0
HEADACHES: 1
OCCASIONAL FEELINGS OF UNSTEADINESS: 1
DEPRESSION: 0

## 2023-11-21 NOTE — PATIENT INSTRUCTIONS
Continue all current medications as prescribed. Please be very careful in discontinuing all of your medications all at once at this can have adverse effects on the heart.  To prevent any future progression of any mild cholesterol plaque seen on your CT scan, it is important to monitor and manage your cholesterol through diet, exercise and medication compliance.  Dr. Marin has placed a referral to Dr. Valencia, electrical specialist of the heart, for followup of a-fib.  Dr. Marin has ordered a repeat echocardiogram (ultrasound of the heart) to followup on your heart function and structure.  Dr. Marin has placed a referral to neurology as per your request.  Followup with Dr. Marin in 1 month.    If you have any questions or cardiac concerns, please call our office at [phone].

## 2023-12-13 ENCOUNTER — HOSPITAL ENCOUNTER (OUTPATIENT)
Dept: CARDIOLOGY | Facility: HOSPITAL | Age: 63
Discharge: HOME | End: 2023-12-13
Payer: COMMERCIAL

## 2023-12-13 DIAGNOSIS — I42.9 CARDIOMYOPATHY, UNSPECIFIED TYPE (MULTI): ICD-10-CM

## 2023-12-13 DIAGNOSIS — I48.0 PAROXYSMAL ATRIAL FIBRILLATION (MULTI): ICD-10-CM

## 2023-12-13 DIAGNOSIS — I48.20 CHRONIC ATRIAL FIBRILLATION, UNSPECIFIED (MULTI): ICD-10-CM

## 2023-12-13 DIAGNOSIS — I25.10 CORONARY ARTERY DISEASE INVOLVING NATIVE CORONARY ARTERY OF NATIVE HEART WITHOUT ANGINA PECTORIS: ICD-10-CM

## 2023-12-13 LAB
AORTIC VALVE MEAN GRADIENT: 8
AORTIC VALVE PEAK VELOCITY: 2.08
AV PEAK GRADIENT: 17.3
AVA (PEAK VEL): 1.8
AVA (VTI): 2.21
EJECTION FRACTION APICAL 4 CHAMBER: 55.6
LEFT ATRIUM VOLUME AREA LENGTH INDEX BSA: 15.3
LEFT VENTRICLE INTERNAL DIMENSION DIASTOLE: 4.8 (ref 3.5–6)
LEFT VENTRICULAR OUTFLOW TRACT DIAMETER: 2.3
MITRAL VALVE E/A RATIO: 0.6
MITRAL VALVE E/E' RATIO: 6.91
RIGHT VENTRICLE FREE WALL PEAK S': 11
RIGHT VENTRICLE PEAK SYSTOLIC PRESSURE: 23.3
TRICUSPID ANNULAR PLANE SYSTOLIC EXCURSION: 2.4

## 2023-12-13 PROCEDURE — 93306 TTE W/DOPPLER COMPLETE: CPT | Performed by: INTERNAL MEDICINE

## 2023-12-13 PROCEDURE — 93306 TTE W/DOPPLER COMPLETE: CPT

## 2023-12-14 ENCOUNTER — HOSPITAL ENCOUNTER (OUTPATIENT)
Dept: CARDIOLOGY | Facility: HOSPITAL | Age: 63
Discharge: HOME | End: 2023-12-14
Payer: COMMERCIAL

## 2023-12-14 ENCOUNTER — TELEPHONE (OUTPATIENT)
Dept: CARDIOLOGY | Facility: CLINIC | Age: 63
End: 2023-12-14
Payer: COMMERCIAL

## 2023-12-14 DIAGNOSIS — I48.20 CHRONIC ATRIAL FIBRILLATION, UNSPECIFIED (MULTI): ICD-10-CM

## 2023-12-14 DIAGNOSIS — I42.9 CARDIOMYOPATHY, UNSPECIFIED TYPE (MULTI): ICD-10-CM

## 2023-12-14 DIAGNOSIS — I48.0 PAROXYSMAL ATRIAL FIBRILLATION (MULTI): ICD-10-CM

## 2023-12-14 DIAGNOSIS — Z95.0 PRESENCE OF CARDIAC PACEMAKER: Primary | ICD-10-CM

## 2023-12-14 DIAGNOSIS — Z95.0 PRESENCE OF CARDIAC PACEMAKER: ICD-10-CM

## 2023-12-14 PROCEDURE — 93290 INTERROG DEV EVAL ICPMS IP: CPT | Performed by: INTERNAL MEDICINE

## 2023-12-14 PROCEDURE — 93280 PM DEVICE PROGR EVAL DUAL: CPT

## 2023-12-14 PROCEDURE — 93280 PM DEVICE PROGR EVAL DUAL: CPT | Performed by: INTERNAL MEDICINE

## 2023-12-14 NOTE — TELEPHONE ENCOUNTER
----- Message from Fartun Chacon RN sent at 12/14/2023 12:02 PM EST -----  Regarding: ?green alert- more AT/AF, noting during probable sleep hours... not on OAC, but known (per patient).    thanks.  Device check today-     4 AT/AF since 11.14.23, longest ~3.2 min, max V listed 95 bpm, EGM's- AFib w/VS & .  Note all 4 AT/AF occurred during probable sleep hours.  States that he is not on OAC.  AT/AF data not available from 8.8.23 to 11.14.23, but trend graph suggests episode ~September lasting ~1 hr, and another ~October lasting minutes.  Will inform Dr. Marin's office of more AT/AF (most recently occurring during sleep hours).   SP- Dr. Valencia.            Ariel CORLEY

## 2023-12-19 PROBLEM — H54.62 VISION LOSS OF LEFT EYE: Status: ACTIVE | Noted: 2020-02-26

## 2023-12-19 PROBLEM — K21.9 GASTROESOPHAGEAL REFLUX DISEASE: Status: ACTIVE | Noted: 2020-02-26

## 2023-12-19 PROBLEM — Z95.0 PACEMAKER: Status: ACTIVE | Noted: 2017-11-20

## 2023-12-19 PROBLEM — G43.019 INTRACTABLE MIGRAINE WITHOUT AURA AND WITHOUT STATUS MIGRAINOSUS: Status: ACTIVE | Noted: 2020-02-26

## 2023-12-19 PROBLEM — I49.5 SINUS NODE DYSFUNCTION (MULTI): Status: ACTIVE | Noted: 2017-04-28

## 2023-12-19 NOTE — PROGRESS NOTES
Electrophysiology Follow up Visit    Gary Briggs is a 63 y.o. year old male patient with    1. Nonischemic cardiomyopathy: Status post ICD implant. EF is low as 25%. This has since recovered, echo from 2014 with EF 60%. Updated in December 2023 with normal LV function of 55-60%.      2. Sinus node dysfunction: ICD implant downgraded to a pacemaker at some point.     3. Atrial arrhythmias: Status post atrial flutter ablation in 2013.      4. Hypertension     5. Diabetes     We saw patient in 2022 to establish care as he was followed in [place]. Device report showed very low burden of atrial fibrillation. He was started on Eliquis at some point but was switched to Xarelto.     Patient here for follow-up for atrial fibrillation.  Recent device check this month showed 4 brief episodes of AT/AF with longest episode being 3.2 minutes.  Episodes are occurring mainly during sleeping hours.  When contacted by device clinic patient reports he is not on anticoagulation at this time      Medical History  He has no past medical history on file.    Social History   reports that he has been smoking cigarettes. He has been smoking an average of .5 packs per day. He has never used smokeless tobacco. He reports current alcohol use. He reports that he does not use drugs.      FamHx: No family history on file.    Allergies   Allergen Reactions    Antibiotic [Csmxi-Bxdjh-Ewzqqbo-Pramoxine] Anaphylaxis     Patient states he is allergic to all antibiotics    Levofloxacin In D5w Unknown    Neomycin-Polymyxin-Lidocaine Unknown     Makes patient very ill    Penicillins Anaphylaxis    Sulfa (Sulfonamide Antibiotics) Anaphylaxis        Outpatient Medications:  Current Outpatient Medications   Medication Instructions    albuterol 2.5 mg /3 mL (0.083 %) nebulizer solution 3 mL, nebulization, Every 6 hours PRN    albuterol 90 mcg/actuation inhaler 2 puffs, inhalation, Every 6 hours PRN    amitriptyline (Elavil) 50 mg tablet      "atorvastatin (Lipitor) 80 mg tablet 1 tablet, oral, Nightly    azelastine (Astelin) 137 mcg (0.1 %) nasal spray 2 sprays, nasal    carvedilol (COREG) 12.5 mg, oral, 2 times daily with meals    dapagliflozin propanediol (Farxiga) 5 mg 1 tablet, oral, Daily before breakfast    digoxin (LANOXIN) 250 mcg, oral, Daily    famotidine (Pepcid) 20 mg tablet 1 tablet, oral, Nightly    fluticasone (Flonase) 50 mcg/actuation nasal spray 2 sprays, Each Nostril, Daily    furosemide (LASIX) 20 mg, oral, Daily    ibuprofen (IBU) 600 mg tablet 1 tablet, oral, 3 times daily PRN    linaGLIPtin (Tradjenta) 5 mg tablet 1 tablet, oral, Daily    lisinopril 10 mg, oral, Daily RT    metFORMIN (GLUCOPHAGE) 1,000 mg, oral    montelukast (Singulair) 10 mg tablet 1 tablet, oral, Daily    spironolactone (ALDACTONE) 25 mg, oral, 2 times daily    Xarelto 20 mg, oral         Last Recorded Vitals: .vital      10/31/2022    10:44 AM 10/31/2022    10:58 AM 3/3/2023    11:51 AM 11/14/2023    12:00 PM 11/21/2023    10:05 AM   Vitals   Systolic 124 124 115 120 116   Diastolic 76 76 68 75 58   Heart Rate  74 82 59 81   Resp 16 16 16 18    Height (in) 1.93 m (6' 4\") 1.93 m (6' 4\") 1.93 m (6' 4\") 1.93 m (6' 4\") 1.93 m (6' 4\")   Weight (lb) 243 243 252 240 239   BMI 29.58 kg/m2 29.58 kg/m2 30.67 kg/m2 29.21 kg/m2 29.09 kg/m2   BSA (m2) 2.43 m2 2.43 m2 2.47 m2 2.42 m2 2.41 m2   Visit Report     Report    Visit Vitals  Smoking Status Every Day        Physical Exam  Constitutional:       General: He is not in acute distress.     Appearance: Normal appearance.   Cardiovascular:      Rate and Rhythm: Normal rate and regular rhythm.   Musculoskeletal:         General: No swelling.   Skin:     General: Skin is warm and dry.   Neurological:      Mental Status: He is alert.       Lab Results   Component Value Date    WBC 10.0 10/31/2022    HGB 16.1 10/31/2022    HCT 46.8 10/31/2022     10/31/2022    ALT 25 10/31/2022    AST 20 10/31/2022     11/08/2023 "    K 4.2 11/08/2023     11/08/2023    CREATININE 1.02 11/08/2023    BUN 17 11/08/2023    CO2 26 11/08/2023       Assessment    Patient with very low A-fib burden which is asymptomatic.  He appears to have an episode every few months which last for only a few minutes.  His last echo shows a normalized LV function.  At this time, I do not believe he need any further treatment.  We will continue to follow him via the device clinic, if his A-fib burden increases significantly, we can consider treatment beyond anticoagulation.

## 2023-12-21 ENCOUNTER — OFFICE VISIT (OUTPATIENT)
Dept: CARDIOLOGY | Facility: CLINIC | Age: 63
End: 2023-12-21
Payer: COMMERCIAL

## 2023-12-21 VITALS
HEIGHT: 76 IN | HEART RATE: 85 BPM | BODY MASS INDEX: 29.1 KG/M2 | DIASTOLIC BLOOD PRESSURE: 72 MMHG | SYSTOLIC BLOOD PRESSURE: 110 MMHG | WEIGHT: 239 LBS

## 2023-12-21 DIAGNOSIS — I48.0 PAROXYSMAL ATRIAL FIBRILLATION (MULTI): Primary | ICD-10-CM

## 2023-12-21 PROCEDURE — 99214 OFFICE O/P EST MOD 30 MIN: CPT | Performed by: INTERNAL MEDICINE

## 2023-12-21 PROCEDURE — 4010F ACE/ARB THERAPY RXD/TAKEN: CPT | Performed by: INTERNAL MEDICINE

## 2023-12-21 PROCEDURE — 3074F SYST BP LT 130 MM HG: CPT | Performed by: INTERNAL MEDICINE

## 2023-12-21 PROCEDURE — 93000 ELECTROCARDIOGRAM COMPLETE: CPT | Performed by: INTERNAL MEDICINE

## 2023-12-21 PROCEDURE — 3078F DIAST BP <80 MM HG: CPT | Performed by: INTERNAL MEDICINE

## 2023-12-21 RX ORDER — DIGOXIN 125 MCG
125 TABLET ORAL DAILY
Qty: 60 TABLET | Refills: 2 | Status: SHIPPED
Start: 2023-12-21 | End: 2024-01-02 | Stop reason: ALTCHOICE

## 2023-12-30 NOTE — PROGRESS NOTES
"Counseling:  The patient was counseled regarding diagnostic results, instructions for management, risk factor reductions, prognosis, patient and family education, impressions, risks and benefits of treatment options and importance of compliance with treatment.      Chief Complaint:   The patient presents today for 6-week followup of chest discomfort s/p echocardiogram.       History Of Present Illness:    Gary Shi is a 62 year old male patient who presents today for 6-week followup of chest discomfort s/p echocardiogram. His PMH is significant for paroxysmal atrial fibrillation. Echocardiogram performed 12/13/2023 demonstrated an EF of 55-60% and mild aortic stenosis. Today, the patient reports persistent chest twinges, although stable. He denies any SOB either at rest or with exertion. He remains compliant with his prescribed medications. Per the patient, Dr. Valencia reduced his digoxin to 125 mcg daily, indicating that he likely does not require it at al; as episodes are infrequent.    Last Recorded Vitals:  Vitals:    01/02/24 1008   BP: 122/78   BP Location: Left arm   Pulse: 74   Weight: 110 kg (242 lb)   Height: 1.905 m (6' 3\")     Past Surgical History:  He has a past surgical history that includes CT angio coronary art with heartflow if score >30% (11/14/2023).      Social History:  He reports that he has been smoking cigarettes. He has been smoking an average of .5 packs per day. He has never used smokeless tobacco. He reports current alcohol use. He reports that he does not use drugs.    Family History:  No family history on file.     Allergies:  Antibiotic [zemjd-qmuyj-sxwsewe-pramoxine], Levofloxacin in d5w, Neomycin-polymyxin-lidocaine, Penicillins, and Sulfa (sulfonamide antibiotics)    Outpatient Medications:  Current Outpatient Medications   Medication Instructions    albuterol 2.5 mg /3 mL (0.083 %) nebulizer solution 3 mL, nebulization, Every 6 hours PRN    albuterol 90 mcg/actuation inhaler " 2 puffs, inhalation, Every 6 hours PRN    amitriptyline (Elavil) 50 mg tablet     atorvastatin (Lipitor) 80 mg tablet 1 tablet, oral, Nightly    azelastine (Astelin) 137 mcg (0.1 %) nasal spray 2 sprays, nasal    carvedilol (COREG) 12.5 mg, oral, 2 times daily with meals    dapagliflozin propanediol (Farxiga) 5 mg 1 tablet, oral, Daily before breakfast    digoxin (LANOXIN) 125 mcg, oral, Daily    famotidine (Pepcid) 20 mg tablet 1 tablet, oral, Nightly    fluticasone (Flonase) 50 mcg/actuation nasal spray 2 sprays, Each Nostril, Daily    furosemide (LASIX) 20 mg, oral, Daily    ibuprofen (IBU) 600 mg tablet 1 tablet, oral, 3 times daily PRN    linaGLIPtin (Tradjenta) 5 mg tablet 1 tablet, oral, Daily    lisinopril 10 mg, oral, Daily RT    metFORMIN (GLUCOPHAGE) 1,000 mg, oral    montelukast (Singulair) 10 mg tablet 1 tablet, oral, Daily    spironolactone (ALDACTONE) 25 mg, oral, 2 times daily    Xarelto 20 mg, oral     Review of Systems   Cardiovascular:         Occasional chest twinges   All other systems reviewed and are negative.     Physical Exam:  Constitutional:       Appearance: Healthy appearance. Not in distress.   Neck:      Vascular: No JVR. JVD normal.   Pulmonary:      Effort: Pulmonary effort is normal.      Breath sounds: Normal breath sounds. No wheezing. No rhonchi. No rales.   Chest:      Chest wall: Not tender to palpatation.   Cardiovascular:      PMI at left midclavicular line. Normal rate. Regular rhythm. Normal S1. Normal S2.       Murmurs: There is no murmur.      No gallop.  No click. No rub.   Pulses:     Intact distal pulses.   Edema:     Peripheral edema absent.   Abdominal:      General: Bowel sounds are normal.      Palpations: Abdomen is soft.      Tenderness: There is no abdominal tenderness.   Musculoskeletal: Normal range of motion.         General: No tenderness. Skin:     General: Skin is warm and dry.   Neurological:      General: No focal deficit present.      Mental Status:  Alert and oriented to person, place and time.       Last Labs:  CBC -  Lab Results   Component Value Date    WBC 10.0 10/31/2022    HGB 16.1 10/31/2022    HCT 46.8 10/31/2022    MCV 95 10/31/2022     10/31/2022       CMP -  Lab Results   Component Value Date    CALCIUM 9.3 11/08/2023    PROT 6.7 10/31/2022    ALBUMIN 4.2 10/31/2022    AST 20 10/31/2022    ALT 25 10/31/2022    ALKPHOS 53 10/31/2022    BILITOT 0.5 10/31/2022       RENAL FUNCTION PANEL -   Lab Results   Component Value Date    GLUCOSE 178 (H) 11/08/2023     11/08/2023    K 4.2 11/08/2023     11/08/2023    CO2 26 11/08/2023    ANIONGAP 11 11/08/2023    BUN 17 11/08/2023    CREATININE 1.02 11/08/2023    GFRMALE 67 10/31/2022    CALCIUM 9.3 11/08/2023    ALBUMIN 4.2 10/31/2022        Lab Results   Component Value Date    HGBA1C 7.6 (H) 09/16/2021       Last Cardiology Tests:  12/13/2023 - TTE  1. Left ventricular systolic function is normal with a 55-60% estimated ejection fraction.  2. Spectral Doppler shows an impaired relaxation pattern of left ventricular diastolic filling.  3. Mild aortic valve stenosis.    11/14/2023 - CTA Coronary Arteries  1.  Normal coronary anatomy. Calcium score as above. Diagonal 1 and posterolateral branch of the RCA both of which are small caliber vessels were not opacified well. Soft plaque with 20% stenosis seen in the mid to distal left main as well as ostial RCA.   2. Mild nonobstructive coronary artery disease in rest of the coronaries.  3. Presence of cardiac pacemaker, dual-chamber.    Diagnostic review: I have personally reviewed the result(s) of the Echocardiogram.    Assessment/Plan   1) Chest Pain in a Patient with Risk Factors for CAD, Aortic Stenosis  On atorvastatin 80 mg daily, carvedilol 12.5 mg BID, digoxin 125 mcg daily, furosemide 20 mg daily, lisinopril 10 mg daily, Xarelto 20 mg daily.  Apparently stress test last year was negative  Lipid panel 03/2023 with LDL of 68, elevated  "triglycerides of 182  BMP drawn 11/08/2023 was unremarkable   CTA coronary arteries 11/14/2023 with normal coronary anatomy, elevated calcium score of 154.52, soft plaque with 20% stenosis seen in the mid to distal left main as well as ostial RCA and mild nonobstructive CAD.  Patient states that he will occasionally discontinue all medications for a day or two when he feels unwell to \"clear things out\" - educated on the risks associated with suddenly stopping all medications abruptly such as stroke or blood clots  Secondary risk factor modification   TTE 12/13/2023 with LVEF 55-60% and mild aortic stenosis  Persistent, although stable chest discomfort (occasional)  D/C digoxin   Followup with Marlena Rivera NP, in 6 months.    2) Atrial Fib s/p PPM  On Xarelto 20 mg daily, digoxin 125 mcg daily  As per EP   Persistent intermittent chest discomfort - ? A-fib   Received ?green alert 12/14/2023 - more AT/AF noted during probable sleep hours  Seen by EP 12/21/2023 - very low A-fib burden (asymptomatic), further treatment not warranted at this time, continue to followup via device clinic with consideration of treatment if AF burden increases significantly.  Per the patient, Dr. Valencia reduced digoxin to 125 mcg daily, indicating that he likely does not require it as episodes are infrequent.  D/C digoxin   Followup with Marlena Rivera NP, in 6 months.    3) Headaches, Sleep Disturbance  Referral placed to neurology as per patient's request      Scribe Attestation  By signing my name below, I, Burton Guillen   attest that this documentation has been prepared under the direction and in the presence of Eric Marin MD.   "

## 2024-01-02 ENCOUNTER — OFFICE VISIT (OUTPATIENT)
Dept: CARDIOLOGY | Facility: CLINIC | Age: 64
End: 2024-01-02
Payer: COMMERCIAL

## 2024-01-02 VITALS
BODY MASS INDEX: 30.09 KG/M2 | HEART RATE: 74 BPM | DIASTOLIC BLOOD PRESSURE: 78 MMHG | WEIGHT: 242 LBS | SYSTOLIC BLOOD PRESSURE: 122 MMHG | HEIGHT: 75 IN

## 2024-01-02 DIAGNOSIS — Z95.810 S/P ICD (INTERNAL CARDIAC DEFIBRILLATOR) PROCEDURE: Primary | Chronic | ICD-10-CM

## 2024-01-02 DIAGNOSIS — I42.8 CARDIOMYOPATHY, NONISCHEMIC (MULTI): Chronic | ICD-10-CM

## 2024-01-02 PROCEDURE — 3074F SYST BP LT 130 MM HG: CPT | Performed by: INTERNAL MEDICINE

## 2024-01-02 PROCEDURE — 3078F DIAST BP <80 MM HG: CPT | Performed by: INTERNAL MEDICINE

## 2024-01-02 PROCEDURE — 99213 OFFICE O/P EST LOW 20 MIN: CPT | Performed by: INTERNAL MEDICINE

## 2024-01-02 PROCEDURE — 4010F ACE/ARB THERAPY RXD/TAKEN: CPT | Performed by: INTERNAL MEDICINE

## 2024-01-02 ASSESSMENT — ENCOUNTER SYMPTOMS
OCCASIONAL FEELINGS OF UNSTEADINESS: 1
LOSS OF SENSATION IN FEET: 0
DEPRESSION: 0

## 2024-01-02 NOTE — LETTER
"January 2, 2024     Malika Martines MD  [facility] [place] [address]    Patient: Gary Shi   YOB: 1960   Date of Visit: 1/2/2024       Dear Dr. Malika Martines MD:    Thank you for referring Gary Shi to me for evaluation. Below are my notes for this consultation.  If you have questions, please do not hesitate to call me. I look forward to following your patient along with you.       Sincerely,     Eric Marin MD      CC: No Recipients  ______________________________________________________________________________________    Counseling:  The patient was counseled regarding diagnostic results, instructions for management, risk factor reductions, prognosis, patient and family education, impressions, risks and benefits of treatment options and importance of compliance with treatment.      Chief Complaint:   The patient presents today for 6-week followup of chest discomfort s/p echocardiogram.       History Of Present Illness:    Gary Shi is a 62 year old male patient who presents today for 6-week followup of chest discomfort s/p echocardiogram. His PMH is significant for paroxysmal atrial fibrillation. Echocardiogram performed 12/13/2023 demonstrated an EF of 55-60% and mild aortic stenosis. Today, the patient reports persistent chest twinges, although stable. He denies any SOB either at rest or with exertion. He remains compliant with his prescribed medications. Per the patient, Dr. Valencia reduced his digoxin to 125 mcg daily, indicating that he likely does not require it at al; as episodes are infrequent.    Last Recorded Vitals:  Vitals:    01/02/24 1008   BP: 122/78   BP Location: Left arm   Pulse: 74   Weight: 110 kg (242 lb)   Height: 1.905 m (6' 3\")     Past Surgical History:  He has a past surgical history that includes CT angio coronary art with heartflow if score >30% (11/14/2023).      Social History:  He reports that he has been smoking cigarettes. " He has been smoking an average of .5 packs per day. He has never used smokeless tobacco. He reports current alcohol use. He reports that he does not use drugs.    Family History:  No family history on file.     Allergies:  Antibiotic [ustwy-zelbu-hctxvvj-pramoxine], Levofloxacin in d5w, Neomycin-polymyxin-lidocaine, Penicillins, and Sulfa (sulfonamide antibiotics)    Outpatient Medications:  Current Outpatient Medications   Medication Instructions   • albuterol 2.5 mg /3 mL (0.083 %) nebulizer solution 3 mL, nebulization, Every 6 hours PRN   • albuterol 90 mcg/actuation inhaler 2 puffs, inhalation, Every 6 hours PRN   • amitriptyline (Elavil) 50 mg tablet    • atorvastatin (Lipitor) 80 mg tablet 1 tablet, oral, Nightly   • azelastine (Astelin) 137 mcg (0.1 %) nasal spray 2 sprays, nasal   • carvedilol (COREG) 12.5 mg, oral, 2 times daily with meals   • dapagliflozin propanediol (Farxiga) 5 mg 1 tablet, oral, Daily before breakfast   • digoxin (LANOXIN) 125 mcg, oral, Daily   • famotidine (Pepcid) 20 mg tablet 1 tablet, oral, Nightly   • fluticasone (Flonase) 50 mcg/actuation nasal spray 2 sprays, Each Nostril, Daily   • furosemide (LASIX) 20 mg, oral, Daily   • ibuprofen (IBU) 600 mg tablet 1 tablet, oral, 3 times daily PRN   • linaGLIPtin (Tradjenta) 5 mg tablet 1 tablet, oral, Daily   • lisinopril 10 mg, oral, Daily RT   • metFORMIN (GLUCOPHAGE) 1,000 mg, oral   • montelukast (Singulair) 10 mg tablet 1 tablet, oral, Daily   • spironolactone (ALDACTONE) 25 mg, oral, 2 times daily   • Xarelto 20 mg, oral     Review of Systems   Cardiovascular:         Occasional chest twinges   All other systems reviewed and are negative.     Physical Exam:  Constitutional:       Appearance: Healthy appearance. Not in distress.   Neck:      Vascular: No JVR. JVD normal.   Pulmonary:      Effort: Pulmonary effort is normal.      Breath sounds: Normal breath sounds. No wheezing. No rhonchi. No rales.   Chest:      Chest wall: Not  tender to palpatation.   Cardiovascular:      PMI at left midclavicular line. Normal rate. Regular rhythm. Normal S1. Normal S2.       Murmurs: There is no murmur.      No gallop.  No click. No rub.   Pulses:     Intact distal pulses.   Edema:     Peripheral edema absent.   Abdominal:      General: Bowel sounds are normal.      Palpations: Abdomen is soft.      Tenderness: There is no abdominal tenderness.   Musculoskeletal: Normal range of motion.         General: No tenderness. Skin:     General: Skin is warm and dry.   Neurological:      General: No focal deficit present.      Mental Status: Alert and oriented to person, place and time.       Last Labs:  CBC -  Lab Results   Component Value Date    WBC 10.0 10/31/2022    HGB 16.1 10/31/2022    HCT 46.8 10/31/2022    MCV 95 10/31/2022     10/31/2022       CMP -  Lab Results   Component Value Date    CALCIUM 9.3 11/08/2023    PROT 6.7 10/31/2022    ALBUMIN 4.2 10/31/2022    AST 20 10/31/2022    ALT 25 10/31/2022    ALKPHOS 53 10/31/2022    BILITOT 0.5 10/31/2022       RENAL FUNCTION PANEL -   Lab Results   Component Value Date    GLUCOSE 178 (H) 11/08/2023     11/08/2023    K 4.2 11/08/2023     11/08/2023    CO2 26 11/08/2023    ANIONGAP 11 11/08/2023    BUN 17 11/08/2023    CREATININE 1.02 11/08/2023    GFRMALE 67 10/31/2022    CALCIUM 9.3 11/08/2023    ALBUMIN 4.2 10/31/2022        Lab Results   Component Value Date    HGBA1C 7.6 (H) 09/16/2021       Last Cardiology Tests:  12/13/2023 - TTE  1. Left ventricular systolic function is normal with a 55-60% estimated ejection fraction.  2. Spectral Doppler shows an impaired relaxation pattern of left ventricular diastolic filling.  3. Mild aortic valve stenosis.    11/14/2023 - CTA Coronary Arteries  1.  Normal coronary anatomy. Calcium score as above. Diagonal 1 and posterolateral branch of the RCA both of which are small caliber vessels were not opacified well. Soft plaque with 20% stenosis seen  "in the mid to distal left main as well as ostial RCA.   2. Mild nonobstructive coronary artery disease in rest of the coronaries.  3. Presence of cardiac pacemaker, dual-chamber.    Diagnostic review: I have personally reviewed the result(s) of the Echocardiogram.    Assessment/Plan  1) Chest Pain in a Patient with Risk Factors for CAD, Aortic Stenosis  On atorvastatin 80 mg daily, carvedilol 12.5 mg BID, digoxin 125 mcg daily, furosemide 20 mg daily, lisinopril 10 mg daily, Xarelto 20 mg daily.  Apparently stress test last year was negative  Lipid panel 03/2023 with LDL of 68, elevated triglycerides of 182  BMP drawn 11/08/2023 was unremarkable   CTA coronary arteries 11/14/2023 with normal coronary anatomy, elevated calcium score of 154.52, soft plaque with 20% stenosis seen in the mid to distal left main as well as ostial RCA and mild nonobstructive CAD.  Patient states that he will occasionally discontinue all medications for a day or two when he feels unwell to \"clear things out\" - educated on the risks associated with suddenly stopping all medications abruptly such as stroke or blood clots  Secondary risk factor modification   TTE 12/13/2023 with LVEF 55-60% and mild aortic stenosis  Persistent, although stable chest discomfort (occasional)  D/C digoxin   Followup with Marlena Rivera NP, in 6 months.    2) Atrial Fib s/p PPM  On Xarelto 20 mg daily, digoxin 125 mcg daily  As per EP   Persistent intermittent chest discomfort - ? A-fib   Received ?green alert 12/14/2023 - more AT/AF noted during probable sleep hours  Seen by EP 12/21/2023 - very low A-fib burden (asymptomatic), further treatment not warranted at this time, continue to followup via device clinic with consideration of treatment if AF burden increases significantly.  Per the patient, Dr. Valencia reduced digoxin to 125 mcg daily, indicating that he likely does not require it as episodes are infrequent.  D/C digoxin   Followup with Marlena Rivera, " NP, in 6 months.    3) Headaches, Sleep Disturbance  Referral placed to neurology as per patient's request      Scribe Attestation  By signing my name below, I, Jazmín Diaz, Scribe   attest that this documentation has been prepared under the direction and in the presence of Eric Marin MD.

## 2024-01-07 DIAGNOSIS — J30.1 ALLERGIC RHINITIS DUE TO POLLEN, UNSPECIFIED SEASONALITY: Primary | ICD-10-CM

## 2024-01-08 RX ORDER — MONTELUKAST SODIUM 10 MG/1
TABLET ORAL
Qty: 30 TABLET | Refills: 3 | Status: SHIPPED | OUTPATIENT
Start: 2024-01-08

## 2024-01-19 DIAGNOSIS — I42.8 CARDIOMYOPATHY, NONISCHEMIC (MULTI): Primary | Chronic | ICD-10-CM

## 2024-01-19 RX ORDER — FUROSEMIDE 20 MG/1
20 TABLET ORAL DAILY
Qty: 90 TABLET | Refills: 1 | Status: SHIPPED | OUTPATIENT
Start: 2024-01-19 | End: 2024-07-17

## 2024-01-19 NOTE — TELEPHONE ENCOUNTER
Last Appointment: 1/2/24 with Dr. Marin  Next Appointment: 7/2/24 with Dr. Marin--Needs to be with Marlena. Task sent to change this.  Last Labs: BMP 11/8/23 WNL  Last Refilled: 7/11/23 90 day 1 refill

## 2024-01-19 NOTE — TELEPHONE ENCOUNTER
----- Message from Adalgisa Garcia sent at 1/19/2024  1:52 PM EST -----  Regarding: Med Refill  Patient called for refill of Furosemide 20 mg  Daily.  Please send to [facility].

## 2024-02-11 DIAGNOSIS — I42.0 DILATED CARDIOMYOPATHY (HCC): ICD-10-CM

## 2024-02-12 RX ORDER — SPIRONOLACTONE 25 MG/1
25 TABLET ORAL DAILY
Qty: 90 TABLET | Refills: 1 | Status: SHIPPED | OUTPATIENT
Start: 2024-02-12 | End: 2024-08-10

## 2024-02-22 DIAGNOSIS — I48.0 PAF (PAROXYSMAL ATRIAL FIBRILLATION) (MULTI): ICD-10-CM

## 2024-02-22 RX ORDER — CARVEDILOL 12.5 MG/1
12.5 TABLET ORAL
Qty: 180 TABLET | Refills: 3 | Status: SHIPPED | OUTPATIENT
Start: 2024-02-22 | End: 2025-02-21

## 2024-02-22 NOTE — TELEPHONE ENCOUNTER
Last Appointment: 1/2/24 with Dr. Marin   Next Appointment: 7/3/24 with Marlena Rivera  Last Refilled: 11/6/23 90 days 0 refills

## 2024-02-22 NOTE — TELEPHONE ENCOUNTER
----- Message from Adalgisa Garcia sent at 2/22/2024 10:54 AM EST -----  Regarding: Med Refill  Patient called for refill of Carvedilol/Coreg 12.5 mg Twice daily.  Please send to [facility].

## 2024-02-27 ENCOUNTER — OFFICE VISIT (OUTPATIENT)
Dept: NEUROLOGY | Facility: HOSPITAL | Age: 64
End: 2024-02-27
Payer: COMMERCIAL

## 2024-02-27 VITALS
WEIGHT: 259.3 LBS | DIASTOLIC BLOOD PRESSURE: 76 MMHG | RESPIRATION RATE: 18 BRPM | SYSTOLIC BLOOD PRESSURE: 104 MMHG | HEIGHT: 75 IN | BODY MASS INDEX: 32.24 KG/M2 | HEART RATE: 80 BPM

## 2024-02-27 DIAGNOSIS — G43.711 INTRACTABLE CHRONIC MIGRAINE WITHOUT AURA AND WITH STATUS MIGRAINOSUS: Primary | ICD-10-CM

## 2024-02-27 DIAGNOSIS — I42.9 CARDIOMYOPATHY, UNSPECIFIED TYPE (MULTI): ICD-10-CM

## 2024-02-27 DIAGNOSIS — I48.0 PAROXYSMAL ATRIAL FIBRILLATION (MULTI): ICD-10-CM

## 2024-02-27 DIAGNOSIS — I25.10 CORONARY ARTERY DISEASE INVOLVING NATIVE CORONARY ARTERY OF NATIVE HEART WITHOUT ANGINA PECTORIS: ICD-10-CM

## 2024-02-27 PROCEDURE — 3074F SYST BP LT 130 MM HG: CPT | Performed by: PSYCHIATRY & NEUROLOGY

## 2024-02-27 PROCEDURE — 3078F DIAST BP <80 MM HG: CPT | Performed by: PSYCHIATRY & NEUROLOGY

## 2024-02-27 PROCEDURE — 99215 OFFICE O/P EST HI 40 MIN: CPT | Performed by: PSYCHIATRY & NEUROLOGY

## 2024-02-27 PROCEDURE — 99205 OFFICE O/P NEW HI 60 MIN: CPT | Performed by: PSYCHIATRY & NEUROLOGY

## 2024-02-27 PROCEDURE — 4010F ACE/ARB THERAPY RXD/TAKEN: CPT | Performed by: PSYCHIATRY & NEUROLOGY

## 2024-02-27 RX ORDER — ERENUMAB-AOOE 140 MG/ML
140 INJECTION, SOLUTION SUBCUTANEOUS
Qty: 1 EACH | Refills: 5 | Status: SHIPPED | OUTPATIENT
Start: 2024-02-27

## 2024-02-27 ASSESSMENT — ENCOUNTER SYMPTOMS
OCCASIONAL FEELINGS OF UNSTEADINESS: 0
DEPRESSION: 0
LOSS OF SENSATION IN FEET: 0

## 2024-02-27 ASSESSMENT — PATIENT HEALTH QUESTIONNAIRE - PHQ9
1. LITTLE INTEREST OR PLEASURE IN DOING THINGS: NOT AT ALL
SUM OF ALL RESPONSES TO PHQ9 QUESTIONS 1 AND 2: 0
2. FEELING DOWN, DEPRESSED OR HOPELESS: NOT AT ALL

## 2024-02-27 NOTE — PROGRESS NOTES
"In-clinic visit    Visit type: provider referral: Dr Marin    PCP: Malika Martines MD.    Subjective     Gary Briggs is a 63 y.o. year old right handed male who presents with New Patient Visit (Sleeping disturbances and headaches).    Patient is accompanied by none.     HPI    Limited records available, some neurology records available in [facility].    Has had HA for 10-11 years. Doesn't go away. Sometimes worse. Gets \"bang\" in head, then fades away. Could be excruciating. Had to lie down in a supermarket once because of pain. Sometimes throbbing. Sometimes (+) light/sound sensitivity. HA \"all the time\". States was told in past not getting enough sleep.    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    Has had head CT in past, ? \"TIAs found\".    S/p amitriptyline--did not help  S/p topiramate--didn't help    S/p sumatriptan--had SE    Has not been on Maxalt    S/p doxepine 25mg qhs  S/p baclofen 20mg at bedtime    On carvedilol, can't be on beta blocker    Was seeing [facility] neurologist Lissa COX in past, last was 6/5/23.    Wakes up a lot  Usual bedtime: 8-9p  Usual SOL: minutes to hours  Sleeps 1-2 hours, sometimes has problem going back to bed  Usual wake up time: 2-4am  Napping during the day    Was told had mild MAURI in past, and was told \"insignificant\". Has lost \"a ton of weight\". Was 350 lbs. Last sleep study--last year [facility]--I don't have copy.    Not on Xarelto    (-) asthma  (-) kidney stones  (-) glaucoma    ? TIA--can't do MRI    Has defibrillator. Blind in L eye.    Smoking. Occasional alcohol. No street drug use.      Review of Systems   Constitutional:  Negative for appetite change, chills and fever.   HENT:  Negative for ear pain and nosebleeds.    Eyes:  Negative for discharge and itching.   Respiratory:  Negative for choking and chest tightness.    Cardiovascular:  Negative for chest pain and palpitations.   Gastrointestinal:  Negative for abdominal distention, " abdominal pain and nausea.   Endocrine: Negative for cold intolerance and heat intolerance.   Genitourinary:  Negative for difficulty urinating and dysuria.   Musculoskeletal:  Negative for gait problem and myalgias.   Neurological:  Negative for dizziness, seizures and numbness.     Patient Active Problem List   Diagnosis    Paroxysmal atrial fibrillation (CMS/HCC)    Coronary artery disease involving native coronary artery of native heart    Allergic rhinitis    Cardiomyopathy, nonischemic (CMS/HCC)    Chronic headaches    Gastroesophageal reflux disease    Intractable migraine without aura and without status migrainosus    Obesity    Pacemaker    S/P ICD (internal cardiac defibrillator) procedure    Sinus node dysfunction (CMS/HCC)    Type 2 diabetes mellitus with complication, without long-term current use of insulin (CMS/HCC)    Vision loss of left eye       History reviewed. No pertinent past medical history.  Past Surgical History:   Procedure Laterality Date    CT ANGIO CORONARY ART WITH HEARTFLOW IF SCORE >30%  11/14/2023    CT ANGIO CORONARY ART WITH HEARTFLOW IF SCORE >30% 11/14/2023 POR CT     Social History     Tobacco Use    Smoking status: Every Day     Packs/day: .5     Types: Cigarettes    Smokeless tobacco: Never   Substance Use Topics    Alcohol use: Yes     Comment: occasionally     family history is not on file.    Allergies   Allergen Reactions    Antibiotic [Mfpji-Mqpmm-Hqqiima-Pramoxine] Anaphylaxis     Patient states he is allergic to all antibiotics    Levofloxacin In D5w Unknown    Neomycin-Polymyxin-Lidocaine Unknown     Makes patient very ill    Penicillins Anaphylaxis    Sulfa (Sulfonamide Antibiotics) Anaphylaxis       Current Outpatient Medications:     albuterol 2.5 mg /3 mL (0.083 %) nebulizer solution, Take 3 mL by nebulization every 6 hours if needed., Disp: , Rfl:     albuterol 90 mcg/actuation inhaler, Inhale 2 puffs every 6 hours if needed., Disp: , Rfl:     atorvastatin  "(Lipitor) 80 mg tablet, Take 1 tablet (80 mg) by mouth once daily at bedtime., Disp: , Rfl:     azelastine (Astelin) 137 mcg (0.1 %) nasal spray, Administer 2 sprays into affected nostril(s)., Disp: , Rfl:     carvedilol (Coreg) 12.5 mg tablet, Take 1 tablet (12.5 mg) by mouth 2 times a day with meals., Disp: 180 tablet, Rfl: 3    dapagliflozin propanediol (Farxiga) 5 mg, Take 1 tablet (5 mg) by mouth once daily in the morning. Take before meals., Disp: , Rfl:     famotidine (Pepcid) 20 mg tablet, Take 1 tablet (20 mg) by mouth once daily at bedtime., Disp: , Rfl:     fluticasone (Flonase) 50 mcg/actuation nasal spray, Administer 2 sprays into each nostril once daily., Disp: , Rfl:     furosemide (Lasix) 20 mg tablet, Take 1 tablet (20 mg) by mouth once daily., Disp: 90 tablet, Rfl: 1    linaGLIPtin (Tradjenta) 5 mg tablet, Take 1 tablet (5 mg) by mouth once daily., Disp: , Rfl:     lisinopril 10 mg tablet, Take 1 tablet (10 mg) by mouth once daily., Disp: , Rfl:     metFORMIN (Glucophage) 1,000 mg tablet, Take 1 tablet (1,000 mg) by mouth., Disp: , Rfl:     montelukast (Singulair) 10 mg tablet, Take 1 tablet (10 mg) by mouth once daily., Disp: , Rfl:     spironolactone (Aldactone) 25 mg tablet, Take 1 tablet (25 mg) by mouth 2 times a day., Disp: , Rfl:     Objective     /76   Pulse 80   Resp 18   Ht 1.905 m (6' 3\")   Wt 118 kg (259 lb 4.8 oz)   BMI 32.41 kg/m²     Awake, alert, oriented x3, in no distress  Well-nourished, ambulatory independently  No leg edema    Mental status exam as above, conversant   Fair fund of knowledge  Recent/remote memory fair  Fair attention span  Pupils round reactive to light, 3-4 mm, (-) RAPD   Fundoscopic examination was attempted but fundus was not visualized bilaterally   Full EOMs intact, no nystagmus, no ptosis   V1 to V3 sensation is intact   No facial droop   Hearing grossly intact   No dysarthria  Good shoulder shrug bilaterally   Tongue is midline     Motor " strength is 5/5 on all extremities, tone/bulk normal   Reflexes 1+ on all 4 extremities, downgoing toes bilaterally   Sensation is intact to light touch, vibration on all 4 extremities   Finger to nose test intact bilaterally   Negative Romberg sign   Normal gait       Lab Results   Component Value Date    WBC 10.0 10/31/2022    RBC 4.94 10/31/2022    HGB 16.1 10/31/2022    HCT 46.8 10/31/2022     10/31/2022     11/08/2023    K 4.2 11/08/2023     11/08/2023    BUN 17 11/08/2023    CREATININE 1.02 11/08/2023    EGFR 82 11/08/2023    CALCIUM 9.3 11/08/2023    ALKPHOS 53 10/31/2022    AST 20 10/31/2022    ALT 25 10/31/2022    HGBA1C 7.6 (H) 09/16/2021       Assessment/Plan     Chronic migraine without aura, possible  S/p amitriptyline--did not help  S/p topiramate--didn't help  On carvedilol  S/p sumatriptan--had SE  Has not been on Maxalt--has had ISCHEMIC optic neuropathy per records--contraindicates further triptan use    S/p doxepine 25mg qhs  S/p baclofen 20mg at bedtime    Discussed poss use of cGRP inhibitor. Correct administration discussed. Poss SE discussed. Pt wants to try.    2.   MAURI  3.   Sleep difficulty  No records  Napping  ? Poor sleep hygiene partly  May or may not need repeat sleep study    4.   Tobacco use disorder  Advised quit    Plans:  Pt to get copy of sleep study  Pt to get copy of head CT wo (CD/report)  Try Aimovig 140mg subcu q month--pt requested to go to local [facility] pharmacy  Quit smoking    Call when you inject, follow-up >1 mo after injection    All questions were answered.  Pt knows how to contact my office in case pt has any questions or concerns.    Sorin Calzada MD

## 2024-02-27 NOTE — PATIENT INSTRUCTIONS
Pt to get copy of sleep study  Pt to get copy of head CT wo   Try Aimovig 140mg subcu q month  Quit smoking    Call when you inject, follow-up >1 mo after injection

## 2024-03-26 ENCOUNTER — TELEPHONE (OUTPATIENT)
Dept: ENT CLINIC | Age: 64
End: 2024-03-26

## 2024-03-26 DIAGNOSIS — J30.1 ALLERGIC RHINITIS DUE TO POLLEN, UNSPECIFIED SEASONALITY: ICD-10-CM

## 2024-03-27 RX ORDER — MONTELUKAST SODIUM 10 MG/1
TABLET ORAL
Qty: 30 TABLET | Refills: 3 | OUTPATIENT
Start: 2024-03-27

## 2024-04-26 DIAGNOSIS — K21.9 GASTROESOPHAGEAL REFLUX DISEASE WITHOUT ESOPHAGITIS: ICD-10-CM

## 2024-04-26 RX ORDER — FAMOTIDINE 20 MG/1
20 TABLET, FILM COATED ORAL 2 TIMES DAILY
Qty: 180 TABLET | Refills: 1 | Status: SHIPPED | OUTPATIENT
Start: 2024-04-26 | End: 2024-10-23

## 2024-04-30 ENCOUNTER — HOSPITAL ENCOUNTER (OUTPATIENT)
Dept: CARDIOLOGY | Facility: HOSPITAL | Age: 64
Discharge: HOME | End: 2024-04-30
Payer: COMMERCIAL

## 2024-04-30 DIAGNOSIS — I48.91 UNSPECIFIED ATRIAL FIBRILLATION (MULTI): ICD-10-CM

## 2024-04-30 DIAGNOSIS — Z95.0 PRESENCE OF CARDIAC PACEMAKER: ICD-10-CM

## 2024-04-30 PROCEDURE — 93296 REM INTERROG EVL PM/IDS: CPT

## 2024-05-16 DIAGNOSIS — E78.5 DYSLIPIDEMIA: ICD-10-CM

## 2024-05-17 RX ORDER — ATORVASTATIN CALCIUM 80 MG/1
TABLET, FILM COATED ORAL
Qty: 90 TABLET | Refills: 1 | Status: SHIPPED | OUTPATIENT
Start: 2024-05-17

## 2024-05-17 NOTE — TELEPHONE ENCOUNTER
Last Appointment:  11/10/2023  Future Appointments   Date Time Provider Department Center   6/3/2024  8:30 AM Eunice Block MD [facility]   8/9/2024  9:30 AM Matt Aburto DO [facility]

## 2024-05-20 ENCOUNTER — TELEPHONE (OUTPATIENT)
Dept: ENT CLINIC | Age: 64
End: 2024-05-20

## 2024-05-20 RX ORDER — AZELASTINE 1 MG/ML
2 SPRAY, METERED NASAL 2 TIMES DAILY
Qty: 30 ML | Refills: 1 | Status: SHIPPED | OUTPATIENT
Start: 2024-05-20

## 2024-06-28 PROBLEM — R42 DIZZINESS: Status: ACTIVE | Noted: 2024-06-28

## 2024-06-28 PROBLEM — G45.9 TRANSIENT ISCHEMIC ATTACK: Status: ACTIVE | Noted: 2024-06-28

## 2024-06-28 PROBLEM — R07.9 CHEST PAIN: Status: ACTIVE | Noted: 2024-06-28

## 2024-06-28 RX ORDER — METOPROLOL TARTRATE 50 MG/1
TABLET ORAL
COMMUNITY
Start: 2023-03-03 | End: 2024-07-03 | Stop reason: WASHOUT

## 2024-06-28 RX ORDER — ASPIRIN 81 MG/1
TABLET ORAL
COMMUNITY
Start: 2023-03-03

## 2024-06-28 RX ORDER — IBUPROFEN 600 MG/1
TABLET ORAL
COMMUNITY
Start: 2022-10-31 | End: 2024-07-03 | Stop reason: WASHOUT

## 2024-07-02 ENCOUNTER — APPOINTMENT (OUTPATIENT)
Dept: CARDIOLOGY | Facility: CLINIC | Age: 64
End: 2024-07-02
Payer: COMMERCIAL

## 2024-07-02 PROBLEM — I35.0 AORTIC VALVE STENOSIS: Status: ACTIVE | Noted: 2024-07-02

## 2024-07-02 PROBLEM — I50.32 HEART FAILURE WITH IMPROVED EJECTION FRACTION (HFIMPEF) (MULTI): Status: ACTIVE | Noted: 2024-07-02

## 2024-07-02 ASSESSMENT — ENCOUNTER SYMPTOMS
NAUSEA: 0
HEMATURIA: 0
VOMITING: 0
HEMATOCHEZIA: 0
SHORTNESS OF BREATH: 0
ORTHOPNEA: 0
IRREGULAR HEARTBEAT: 0
CHILLS: 0
SYNCOPE: 0
NEAR-SYNCOPE: 0
FEVER: 0
PALPITATIONS: 0
ALTERED MENTAL STATUS: 0
WHEEZING: 0
COUGH: 0

## 2024-07-02 NOTE — PROGRESS NOTES
Chief Complaint/Reason for Visit:  No chief complaint on file. 6 month cardiovascular follow up    History Of Present Illness:    Gary Briggs is a 63 y.o. male that presents to the office for 6 month follow up.      PMH is significant for paroxysmal atrial fibrillation, HFimpEF, SA node dysfunction s/p PPM and aortic valve stenosis.    Patient states that he quit taking apixaban about two months ago d/t cost.      Past Medical History:  He has no past medical history on file.    Past Surgical History:  He has a past surgical history that includes CT angio coronary art with heartflow if score >30% (11/14/2023).      Social History:  He reports that he has been smoking cigarettes. He has never used smokeless tobacco. He reports current alcohol use. He reports that he does not use drugs.    Family History:  No family history on file.     Allergies:  Antibiotic [[id number]-ckevxlv-pramoxine], Levofloxacin in d5w, Neomycin-polymyxin-lidocaine, Penicillins, and Sulfa (sulfonamide antibiotics)    Review of Systems   Constitutional: Negative for chills and fever.   Cardiovascular:  Positive for dyspnea on exertion (with seasonal allergies). Negative for chest pain, irregular heartbeat, leg swelling, near-syncope, orthopnea, palpitations and syncope.   Respiratory:  Negative for cough, shortness of breath and wheezing.    Hematologic/Lymphatic: Bruises/bleeds easily.   Musculoskeletal:  Positive for joint pain (right knee and hip pain).   Gastrointestinal:  Negative for hematochezia, melena, nausea and vomiting.   Genitourinary:  Negative for hematuria.   Psychiatric/Behavioral:  Negative for altered mental status.        Objective      Vitals reviewed.   Constitutional:       Appearance: Healthy appearance.   Pulmonary:      Effort: Pulmonary effort is normal.      Breath sounds: Normal breath sounds.   Cardiovascular:      PMI at left midclavicular line. Normal rate. Regular rhythm. S1 with normal intensity. S2 with  normal intensity.       Murmurs: There is a systolic murmur.   Edema:     Peripheral edema absent.   Abdominal:      General: Bowel sounds are normal.   Skin:     General: Skin is warm and dry.   Psychiatric:         Attention and Perception: Attention normal.         Mood and Affect: Mood normal.         Behavior: Behavior is cooperative.         Current Outpatient Medications   Medication Instructions    Aimovig Autoinjector 140 mg, subcutaneous, Every 28 days    albuterol 2.5 mg /3 mL (0.083 %) nebulizer solution 3 mL, nebulization, Every 6 hours PRN    albuterol 90 mcg/actuation inhaler 2 puffs, inhalation, Every 6 hours PRN    apixaban (Eliquis) 5 mg tablet oral    aspirin 81 mg EC tablet oral, Daily RT    atorvastatin (Lipitor) 80 mg tablet 1 tablet, oral, Nightly    azelastine (Astelin) 137 mcg (0.1 %) nasal spray 2 sprays, nasal    carvedilol (COREG) 12.5 mg, oral, 2 times daily (morning and late afternoon)    dapagliflozin propanediol (Farxiga) 5 mg 1 tablet, oral, Daily before breakfast    famotidine (Pepcid) 20 mg tablet 1 tablet, oral, Nightly    fluticasone (Flonase) 50 mcg/actuation nasal spray 2 sprays, Each Nostril, Daily    furosemide (LASIX) 20 mg, oral, Daily    ibuprofen (IBU) 600 mg tablet oral    linaGLIPtin (Tradjenta) 5 mg tablet 1 tablet, oral, Daily    lisinopril 10 mg, oral, Daily RT    metFORMIN (GLUCOPHAGE) 1,000 mg, oral    metoprolol tartrate (Lopressor) 50 mg tablet oral    montelukast (Singulair) 10 mg tablet 1 tablet, oral, Daily    rivaroxaban (Xarelto) 20 mg tablet oral    spironolactone (ALDACTONE) 25 mg, oral, 2 times daily        Last Labs:  CBC -  Lab Results   Component Value Date    WBC 10.0 10/31/2022    HGB 16.1 10/31/2022    HCT 46.8 10/31/2022    MCV 95 10/31/2022     10/31/2022       RENAL FUNCTION PANEL -   Lab Results   Component Value Date    GLUCOSE 178 (H) 11/08/2023     11/08/2023    K 4.2 11/08/2023     11/08/2023    CO2 26 11/08/2023     "ANIONGAP 11 11/08/2023    BUN 17 11/08/2023    CREATININE 1.02 11/08/2023    GFRMALE 67 10/31/2022    CALCIUM 9.3 11/08/2023    ALBUMIN 4.2 10/31/2022        CMP -  Lab Results   Component Value Date    CALCIUM 9.3 11/08/2023    PROT 6.7 10/31/2022    ALBUMIN 4.2 10/31/2022    AST 20 10/31/2022    ALT 25 10/31/2022    ALKPHOS 53 10/31/2022    BILITOT 0.5 10/31/2022       LIPID PANEL -   No results found for: \"CHOL\", \"TRIG\", \"HDL\", \"CHHDL\", \"LDLF\", \"VLDL\", \"NHDL\"  No results found for: \"LDLCALC\"    Lab Results   Component Value Date    HGBA1C 7.6 (H) 09/16/2021       No results found for: \"TSH\"    No results found for this or any previous visit.     Last Cardiology Tests:    Last [employer] PPM interrogation 12/14/23    Echo 12/13/23:   1. Left ventricular systolic function is normal with a 55-60% estimated ejection fraction.   2. Spectral Doppler shows an impaired relaxation pattern of left ventricular diastolic filling.   3. Mild aortic valve stenosis.    CTA coronaries 11/15/23   1.  Normal coronary anatomy. Calcium score as above. Diagonal 1 and  posterolateral branch of the RCA both of which are small caliber  vessels were not opacified well. Soft plaque with 20% stenosis seen  in the mid to distal left main as well as ostial RCA.  2. Mild nonobstructive coronary artery disease in rest of the  coronaries.  3. Presence of cardiac pacemaker, dual-chamber.    Visit Vitals  /68   Pulse 80   Wt 119 kg (262 lb)   BMI 32.75 kg/m²   Smoking Status Every Day   BSA 2.51 m²       Assessment/Plan   The primary encounter diagnosis was Paroxysmal atrial fibrillation (Multi). Diagnoses of Sinus node dysfunction (Multi), Heart failure with improved ejection fraction (HFimpEF) (Multi), and Aortic valve stenosis, etiology of cardiac valve disease unspecified were also pertinent to this visit.    1. Paroxysmal atrial fibrillation/atrial arrhythmias s/p aflutter ablation in 2013  [id number] score is 2. (Hypertension - Yes, 1 " point and Congestive Heart Failure  or Left ventricular Dysfunction - Yes, 1 point)  Restart apixaban 5 mg BID .  Patient states he stopped this d/t cost.  Explained indication for anticoagulation with h/o pAF.  Verbalized understanding.  He would like to avoid warfarin if possible (he did take it in the past).    Refer to clinical pharmacy regarding cost of apixaban  Digoxin stopped previously   Follows with EP   Check CBC and CMP    2. HFimpEF; NICM s/p ICD which was then at some point downgraded to PPM  Not volume overloaded on exam  Continue current GDMT which includes:  Beta blocker: Carvedilol 12.5 mg BID  ACE inhibitor/ARB/ARNI: None  MRA: None  SGLT2i:  None  Diuretic: Furosemide 20 mg daily  Device: ICD implant downgraded to a pacemaker at some point. Currently has [employer] Pacemaker   TTE Dec 2023 with LVEF 55-60%     3. Aortic valve stenosis   TTE Dec 2023 with mild aortic valve stenosis   Monitor with serial echocardiogram - repeat echo Dec 2024    4. SA node dysfunction  ICD implant downgraded to a pacemaker at some point.   Follows with EP and  [place] [facility]    5. Chest pain  CTA coronary arteries 11/14/2023 with normal coronary anatomy, elevated calcium score of 154.52, soft plaque with 20% stenosis seen in the mid to distal left main as well as ostial RCA and mild nonobstructive CAD.   TTE 12/13/2023 with LVEF 55-60% and mild aortic stenosis    Marlena Rivera, APRN-CNP

## 2024-07-02 NOTE — PATIENT INSTRUCTIONS
Recommend Mediterranean style of eating  Restart apixaban 5 mg twice a day  I have entered a referral to clinical pharmacy regarding cost of apixaban  Check echocardiogram Dec 2024  Check lab work  Follow-up with Dr. Marin in 6 months  If you have any questions or cardiac concerns, please call our office at [phone].

## 2024-07-03 ENCOUNTER — APPOINTMENT (OUTPATIENT)
Dept: CARDIOLOGY | Facility: CLINIC | Age: 64
End: 2024-07-03
Payer: COMMERCIAL

## 2024-07-03 VITALS
HEART RATE: 80 BPM | SYSTOLIC BLOOD PRESSURE: 136 MMHG | DIASTOLIC BLOOD PRESSURE: 68 MMHG | WEIGHT: 262 LBS | BODY MASS INDEX: 32.75 KG/M2

## 2024-07-03 DIAGNOSIS — I48.0 PAROXYSMAL ATRIAL FIBRILLATION (MULTI): Primary | Chronic | ICD-10-CM

## 2024-07-03 DIAGNOSIS — I49.5 SINUS NODE DYSFUNCTION (MULTI): ICD-10-CM

## 2024-07-03 DIAGNOSIS — I35.0 AORTIC VALVE STENOSIS, ETIOLOGY OF CARDIAC VALVE DISEASE UNSPECIFIED: ICD-10-CM

## 2024-07-03 DIAGNOSIS — I50.32 HEART FAILURE WITH IMPROVED EJECTION FRACTION (HFIMPEF) (MULTI): ICD-10-CM

## 2024-07-03 PROCEDURE — 3075F SYST BP GE 130 - 139MM HG: CPT | Performed by: NURSE PRACTITIONER

## 2024-07-03 PROCEDURE — 99214 OFFICE O/P EST MOD 30 MIN: CPT | Performed by: NURSE PRACTITIONER

## 2024-07-03 PROCEDURE — 4010F ACE/ARB THERAPY RXD/TAKEN: CPT | Performed by: NURSE PRACTITIONER

## 2024-07-03 PROCEDURE — 3078F DIAST BP <80 MM HG: CPT | Performed by: NURSE PRACTITIONER

## 2024-07-03 ASSESSMENT — ENCOUNTER SYMPTOMS
BRUISES/BLEEDS EASILY: 1
DYSPNEA ON EXERTION: 1

## 2024-07-03 NOTE — PROGRESS NOTES
[facility] [place] [facility]      SUBJECTIVE:  Terri Curry (:  1960) is a 63 y.o. male here for evaluation of the following chief complaint(s):  6 Month Follow-Up, Diabetes, and Medication Refill      HPI:   Patient has past medical history of nonvalvular paroxysmal A-fib on Eliquis, HFpEF 50%, TIA, type 2 diabetes, obsecity, migraine/tension type headache, primary knee osteoarthritis presented today for routine follow up.     Interval history:  On office visit 23, Patient reported he is not taking amitriptyline (ELAVIL) 50MG. He woke up during night few times, sometime due to dreaming. He reported postnasal drip, which related to season. He makes his own cigarettes by himself. He would like nicotin patchy to help. He is doing well, no other complain otherwise.     On office visit 24, patient reported he has lower back pain radiated to left legs a week ago, he was mowing the grass and doing labor work. The pain is resolved at this time point. Straight leg test was negative.     Type 2 diabetes mellitus with complication, without long-term current use of insulin  - HbA1c 7.0 (24), 23 was 7.1 > 7.2 on 3/8/2023  - microalbumin 56, Cr urine 112.5, Microalb/Cr ratio 50  - Dapagliflozin (FARXIGA) 5MG tablet once daily - stopped taking due to finance issue   - linagliptin (TRADJENTA) 5MG tablet once daily - due to side effects   - Metformin 1000mg tablet BID     Headache  CT head 2019 negative   Tried Imitrex, topomax, baclofen, not helping  Last seen at  on 24: use of cGRP inhibitor, Try Aimovig 140mg subcu q month - not tried due to finance issue   Cannot do MRI brain due to pacemarker      Vision loss of left eye  - Chronic condition, he can only see lights, everything else look like through filter.   - F/u with eye doctor, once a year    Allergic rhinitis due to pollen  Follow with ENT Dr. Aburto, next appt in

## 2024-07-05 ENCOUNTER — OFFICE VISIT (OUTPATIENT)
Dept: INTERNAL MEDICINE | Age: 64
End: 2024-07-05
Payer: MEDICARE

## 2024-07-05 VITALS
BODY MASS INDEX: 31.6 KG/M2 | OXYGEN SATURATION: 96 % | WEIGHT: 259.5 LBS | TEMPERATURE: 97.2 F | HEART RATE: 72 BPM | DIASTOLIC BLOOD PRESSURE: 81 MMHG | RESPIRATION RATE: 20 BRPM | SYSTOLIC BLOOD PRESSURE: 125 MMHG | HEIGHT: 76 IN

## 2024-07-05 DIAGNOSIS — G44.221 CHRONIC TENSION-TYPE HEADACHE, INTRACTABLE: Chronic | ICD-10-CM

## 2024-07-05 DIAGNOSIS — I48.0 PAROXYSMAL ATRIAL FIBRILLATION (HCC): Chronic | ICD-10-CM

## 2024-07-05 DIAGNOSIS — Z72.0 TOBACCO ABUSE: ICD-10-CM

## 2024-07-05 DIAGNOSIS — I42.0 DILATED CARDIOMYOPATHY (HCC): ICD-10-CM

## 2024-07-05 DIAGNOSIS — K21.9 GASTROESOPHAGEAL REFLUX DISEASE WITHOUT ESOPHAGITIS: ICD-10-CM

## 2024-07-05 DIAGNOSIS — E11.8 TYPE 2 DIABETES MELLITUS WITH COMPLICATION, WITHOUT LONG-TERM CURRENT USE OF INSULIN (HCC): Chronic | ICD-10-CM

## 2024-07-05 DIAGNOSIS — E78.5 DYSLIPIDEMIA: ICD-10-CM

## 2024-07-05 DIAGNOSIS — I42.8 CARDIOMYOPATHY, NONISCHEMIC (HCC): Chronic | ICD-10-CM

## 2024-07-05 DIAGNOSIS — I49.5 SINUS NODE DYSFUNCTION (HCC): Primary | ICD-10-CM

## 2024-07-05 LAB — HBA1C MFR BLD: 7 %

## 2024-07-05 PROCEDURE — 99212 OFFICE O/P EST SF 10 MIN: CPT

## 2024-07-05 PROCEDURE — 83036 HEMOGLOBIN GLYCOSYLATED A1C: CPT

## 2024-07-05 PROCEDURE — 99213 OFFICE O/P EST LOW 20 MIN: CPT

## 2024-07-05 PROCEDURE — 3051F HG A1C>EQUAL 7.0%<8.0%: CPT

## 2024-07-05 RX ORDER — SPIRONOLACTONE 25 MG/1
25 TABLET ORAL DAILY
Qty: 90 TABLET | Refills: 1 | Status: SHIPPED | OUTPATIENT
Start: 2024-07-05 | End: 2025-01-01

## 2024-07-05 RX ORDER — ATORVASTATIN CALCIUM 80 MG/1
TABLET, FILM COATED ORAL
Qty: 90 TABLET | Refills: 1 | Status: SHIPPED | OUTPATIENT
Start: 2024-07-05

## 2024-07-05 RX ORDER — FAMOTIDINE 20 MG/1
20 TABLET, FILM COATED ORAL 2 TIMES DAILY
Qty: 180 TABLET | Refills: 1 | Status: SHIPPED | OUTPATIENT
Start: 2024-07-05 | End: 2025-01-01

## 2024-07-05 RX ORDER — LISINOPRIL 10 MG/1
10 TABLET ORAL DAILY
Qty: 90 TABLET | Refills: 1 | Status: SHIPPED | OUTPATIENT
Start: 2024-07-05 | End: 2025-01-01

## 2024-07-05 NOTE — PATIENT INSTRUCTIONS
Dear Terri Curry,    Thank you for coming to your appointment today. I hope we have addressed all of your needs.     Please make sure to do the following:  - Continue your medications as listed.  - Please get CT lung screening at your convenience   - We will see each other again in 2 months    Call for a sooner appointment if you develop worsen of symptoms     Have a great day!    Sincerely,  Eunice Block MD  7/5/2024  10:34 AM

## 2024-07-05 NOTE — PROGRESS NOTES
[facility]  Internal Medicine Residency Clinic    Attending Physician Statement  I have discussed the case, including pertinent history and exam findings with the resident physician.  I agree with the assessment, plan and orders as documented by the resident. I have reviewed all pertinent PMHx, PSHx, FamHx, SocialHx, medications, and allergies and updated history as appropriate.    Patient here for routine follow up of medical problems.     NIDDM2  -A1c 7; dietary and lifestyle modifications   -patient unable to take his farxiga 2/2 cost   -tradjenta was not able to be tolerated by patient     Migraines   -evaluated by neurology   -patient was prescribed CGRP monthly injection but he is refusing at this time   -conitue oral medications     Dilated Cardiomyopathy  -following with Cardiology  -continue GDMT   -NHYA I  -euvolemic     Atrial Fibrillation   -raj has not used his eliquis 2/2 cost and is working with cardiology for financial aid for eliquis   -continue rate control at this time   -if unable to use apixaban 2/2 CHADsVASC >3, we will switch to warfarin at next appointment or attempt financial assistance through our clinic     HCM  -blood work and AWV scheduled     Remainder of medical problems as per resident note.    Qasim Sharp Jr, DO  7/5/24

## 2024-08-07 DIAGNOSIS — J40 BRONCHITIS: ICD-10-CM

## 2024-08-07 NOTE — TELEPHONE ENCOUNTER
Last Appointment:  7/5/2024  Future Appointments   Date Time Provider Department Center   8/23/2024  9:30 AM Matt Aburto DO [place] [facility]   8/26/2024  8:00 AM [facility] CT  3 [facility] Radiolo   9/6/2024  9:00 AM Eunice Block MD [facility] ECC DEP

## 2024-08-07 NOTE — TELEPHONE ENCOUNTER
Last Appointment:  07/05/2024  Future Appointments   Date Time Provider Department Center   8/23/2024  9:30 AM Matt Aburto DO [place] [facility]   8/26/2024  8:00 AM [facility] CT  3 [facility] CT [facility]   9/6/2024  9:00 AM Eunice Block MD [facility] DEP

## 2024-08-09 RX ORDER — ALBUTEROL SULFATE 2.5 MG/3ML
2.5 SOLUTION RESPIRATORY (INHALATION) EVERY 6 HOURS PRN
Qty: 120 EACH | Refills: 1 | Status: SHIPPED | OUTPATIENT
Start: 2024-08-09

## 2024-08-09 RX ORDER — ALBUTEROL SULFATE 90 UG/1
2 AEROSOL, METERED RESPIRATORY (INHALATION) EVERY 6 HOURS PRN
Qty: 18 G | Refills: 5 | Status: SHIPPED | OUTPATIENT
Start: 2024-08-09

## 2024-08-26 ENCOUNTER — HOSPITAL ENCOUNTER (OUTPATIENT)
Dept: CT IMAGING | Age: 64
Discharge: HOME | End: 2024-08-26
Payer: MEDICARE

## 2024-08-26 DIAGNOSIS — Z72.0 TOBACCO ABUSE: ICD-10-CM

## 2024-08-26 PROCEDURE — 71271 CT THORAX LUNG CANCER SCR C-: CPT

## 2024-09-06 ENCOUNTER — SOCIAL WORK (OUTPATIENT)
Dept: INTERNAL MEDICINE | Age: 64
End: 2024-09-06

## 2024-09-06 ENCOUNTER — OFFICE VISIT (OUTPATIENT)
Dept: INTERNAL MEDICINE | Age: 64
End: 2024-09-06
Payer: MEDICARE

## 2024-09-06 VITALS
DIASTOLIC BLOOD PRESSURE: 73 MMHG | TEMPERATURE: 96.9 F | HEIGHT: 76 IN | RESPIRATION RATE: 18 BRPM | OXYGEN SATURATION: 96 % | BODY MASS INDEX: 31.36 KG/M2 | SYSTOLIC BLOOD PRESSURE: 129 MMHG | WEIGHT: 257.5 LBS | HEART RATE: 84 BPM

## 2024-09-06 DIAGNOSIS — Z00.00 HEALTH CARE MAINTENANCE: ICD-10-CM

## 2024-09-06 DIAGNOSIS — M17.0 PRIMARY OSTEOARTHRITIS OF BOTH KNEES: ICD-10-CM

## 2024-09-06 DIAGNOSIS — Z71.89 COUNSELING FOR LIVING WILL: ICD-10-CM

## 2024-09-06 DIAGNOSIS — E11.8 TYPE 2 DIABETES MELLITUS WITH COMPLICATION, WITHOUT LONG-TERM CURRENT USE OF INSULIN (HCC): ICD-10-CM

## 2024-09-06 DIAGNOSIS — Z00.00 MEDICARE ANNUAL WELLNESS VISIT, SUBSEQUENT: Primary | ICD-10-CM

## 2024-09-06 DIAGNOSIS — E55.9 VITAMIN D DEFICIENCY: ICD-10-CM

## 2024-09-06 DIAGNOSIS — Z13.9 ENCOUNTER FOR SCREENING INVOLVING SOCIAL DETERMINANTS OF HEALTH (SDOH): ICD-10-CM

## 2024-09-06 PROCEDURE — 90653 IIV ADJUVANT VACCINE IM: CPT | Performed by: INTERNAL MEDICINE

## 2024-09-06 PROCEDURE — 99213 OFFICE O/P EST LOW 20 MIN: CPT

## 2024-09-06 SDOH — HEALTH STABILITY: PHYSICAL HEALTH
ON AVERAGE, HOW MANY DAYS PER WEEK DO YOU ENGAGE IN MODERATE TO STRENUOUS EXERCISE (LIKE A BRISK WALK)?: PATIENT DECLINED

## 2024-09-06 ASSESSMENT — PATIENT HEALTH QUESTIONNAIRE - PHQ9
1. LITTLE INTEREST OR PLEASURE IN DOING THINGS: NOT AT ALL
SUM OF ALL RESPONSES TO PHQ QUESTIONS 1-9: 0
SUM OF ALL RESPONSES TO PHQ QUESTIONS 1-9: 0
SUM OF ALL RESPONSES TO PHQ9 QUESTIONS 1 & 2: 0
SUM OF ALL RESPONSES TO PHQ QUESTIONS 1-9: 0
SUM OF ALL RESPONSES TO PHQ QUESTIONS 1-9: 0
2. FEELING DOWN, DEPRESSED OR HOPELESS: NOT AT ALL

## 2024-09-06 ASSESSMENT — LIFESTYLE VARIABLES
HOW OFTEN DO YOU HAVE SIX OR MORE DRINKS ON ONE OCCASION: 98
HOW MANY STANDARD DRINKS CONTAINING ALCOHOL DO YOU HAVE ON A TYPICAL DAY: PATIENT DECLINED
HOW OFTEN DO YOU HAVE A DRINK CONTAINING ALCOHOL: 98
HOW OFTEN DO YOU HAVE A DRINK CONTAINING ALCOHOL: PATIENT DECLINED
HOW MANY STANDARD DRINKS CONTAINING ALCOHOL DO YOU HAVE ON A TYPICAL DAY: 98

## 2024-09-06 NOTE — PROGRESS NOTES
Consulted during 9.6.24 IM appt due to cost of Eliquis.  Met with pt who was accompanied by wife Elizabeth.    Pt recently prescribed Eliquis by EP doctor at [facility]. States has been without for 1 month due to cost; pt has [employer] insurance with monthly copay of $180 which pt states can not afford.  LSW advised of possible assistance thru PAP program and need for showing 3 % pharmacy printout of out of pocket pharmacy expenses which pt and wife concur would not even meet as 1% of income  After much discussion, it appears pt utilized the 30 day free Eliquis coupon as he states he paid zero for prescription (once in lifetime coupon).  Provided pt and wife with information  re: [facility] [address] pharmacy and process to consider as cost is remarkably lower.  Wife stated pt was to follow up with  pharmacy as per recommendation of EP doctor  Highly advised to complete as may have same opportunity.  Advised our office has no samples presently.  Suggested to contact EP for possible samples  Pt endorses dissatisfaction of bureaucracy, but will follow up with  pharmacy.  Provided LSW card to call with outcome

## 2024-09-09 ENCOUNTER — CLINICAL DOCUMENTATION (OUTPATIENT)
Dept: SPIRITUAL SERVICES | Age: 64
End: 2024-09-09

## 2024-09-10 DIAGNOSIS — J30.1 ALLERGIC RHINITIS DUE TO POLLEN, UNSPECIFIED SEASONALITY: ICD-10-CM

## 2024-09-10 RX ORDER — MONTELUKAST SODIUM 10 MG/1
TABLET ORAL
Qty: 30 TABLET | Refills: 1 | Status: SHIPPED | OUTPATIENT
Start: 2024-09-10

## 2024-09-18 ENCOUNTER — TELEPHONE (OUTPATIENT)
Dept: INTERNAL MEDICINE | Age: 64
End: 2024-09-18

## 2024-09-21 DIAGNOSIS — I42.0 DILATED CARDIOMYOPATHY (HCC): ICD-10-CM

## 2024-09-23 RX ORDER — FUROSEMIDE 20 MG/1
20 TABLET ORAL DAILY
Qty: 90 TABLET | Refills: 1 | OUTPATIENT
Start: 2024-09-23 | End: 2025-03-22

## 2024-09-27 DIAGNOSIS — I42.0 DILATED CARDIOMYOPATHY (HCC): ICD-10-CM

## 2024-09-27 DIAGNOSIS — I42.8 CARDIOMYOPATHY, NONISCHEMIC (MULTI): Chronic | ICD-10-CM

## 2024-09-27 RX ORDER — FUROSEMIDE 20 MG
20 TABLET ORAL DAILY
Qty: 90 TABLET | Refills: 1 | Status: SHIPPED | OUTPATIENT
Start: 2024-09-27 | End: 2025-03-26

## 2024-09-27 RX ORDER — FUROSEMIDE 20 MG/1
20 TABLET ORAL DAILY
Qty: 90 TABLET | Refills: 0 | Status: SHIPPED | OUTPATIENT
Start: 2024-09-27 | End: 2025-03-26

## 2024-09-27 NOTE — TELEPHONE ENCOUNTER
9/27 10:11am - Kelli Beaver called for refill of furosemide 20mg once daily. Refill request routed to nursing pool.

## 2024-10-08 ENCOUNTER — OFFICE VISIT (OUTPATIENT)
Dept: ENT CLINIC | Age: 64
End: 2024-10-08

## 2024-10-08 VITALS
BODY MASS INDEX: 32.63 KG/M2 | WEIGHT: 268 LBS | DIASTOLIC BLOOD PRESSURE: 88 MMHG | HEART RATE: 90 BPM | SYSTOLIC BLOOD PRESSURE: 140 MMHG | HEIGHT: 76 IN

## 2024-10-08 DIAGNOSIS — E55.9 VITAMIN D DEFICIENCY: ICD-10-CM

## 2024-10-08 DIAGNOSIS — J30.1 ALLERGIC RHINITIS DUE TO POLLEN, UNSPECIFIED SEASONALITY: Primary | ICD-10-CM

## 2024-10-08 DIAGNOSIS — Z00.00 HEALTH CARE MAINTENANCE: ICD-10-CM

## 2024-10-08 DIAGNOSIS — R09.82 PND (POST-NASAL DRIP): ICD-10-CM

## 2024-10-08 LAB
ALBUMIN: 4.3 G/DL (ref 3.5–5.2)
ALP BLD-CCNC: 57 U/L (ref 40–129)
ALT SERPL-CCNC: 28 U/L (ref 0–40)
ANION GAP SERPL CALCULATED.3IONS-SCNC: 16 MMOL/L (ref 7–16)
AST SERPL-CCNC: 27 U/L (ref 0–39)
BASOPHILS ABSOLUTE: 0.03 K/UL (ref 0–0.2)
BASOPHILS RELATIVE PERCENT: 0 % (ref 0–2)
BILIRUB SERPL-MCNC: 0.6 MG/DL (ref 0–1.2)
BUN BLDV-MCNC: 14 MG/DL (ref 6–23)
CALCIUM SERPL-MCNC: 9.8 MG/DL (ref 8.6–10.2)
CHLORIDE BLD-SCNC: 102 MMOL/L (ref 98–107)
CO2: 22 MMOL/L (ref 22–29)
CREAT SERPL-MCNC: 1 MG/DL (ref 0.7–1.2)
EOSINOPHILS ABSOLUTE: 0.1 K/UL (ref 0.05–0.5)
EOSINOPHILS RELATIVE PERCENT: 1 % (ref 0–6)
GFR, ESTIMATED: 88 ML/MIN/1.73M2
GLUCOSE BLD-MCNC: 151 MG/DL (ref 74–99)
HCT VFR BLD CALC: 48.5 % (ref 37–54)
HEMOGLOBIN: 16.2 G/DL (ref 12.5–16.5)
IMMATURE GRANULOCYTES %: 0 % (ref 0–5)
IMMATURE GRANULOCYTES ABSOLUTE: <0.03 K/UL (ref 0–0.58)
LYMPHOCYTES ABSOLUTE: 3.75 K/UL (ref 1.5–4)
LYMPHOCYTES RELATIVE PERCENT: 49 % (ref 20–42)
MCH RBC QN AUTO: 33.2 PG (ref 26–35)
MCHC RBC AUTO-ENTMCNC: 33.4 G/DL (ref 32–34.5)
MCV RBC AUTO: 99.4 FL (ref 80–99.9)
MONOCYTES ABSOLUTE: 0.79 K/UL (ref 0.1–0.95)
MONOCYTES RELATIVE PERCENT: 10 % (ref 2–12)
NEUTROPHILS ABSOLUTE: 2.99 K/UL (ref 1.8–7.3)
NEUTROPHILS RELATIVE PERCENT: 39 % (ref 43–80)
PDW BLD-RTO: 13.7 % (ref 11.5–15)
PLATELET # BLD: 185 K/UL (ref 130–450)
PMV BLD AUTO: 11.1 FL (ref 7–12)
POTASSIUM SERPL-SCNC: 4.5 MMOL/L (ref 3.5–5)
RBC # BLD: 4.88 M/UL (ref 3.8–5.8)
SODIUM BLD-SCNC: 140 MMOL/L (ref 132–146)
TOTAL PROTEIN: 7.1 G/DL (ref 6.4–8.3)
TSH SERPL DL<=0.05 MIU/L-ACNC: 1.38 UIU/ML (ref 0.27–4.2)
VITAMIN D 25-HYDROXY: 41.5 NG/ML (ref 30–100)
WBC # BLD: 7.7 K/UL (ref 4.5–11.5)

## 2024-10-08 RX ORDER — IPRATROPIUM BROMIDE 42 UG/1
2 SPRAY, METERED NASAL 4 TIMES DAILY
Qty: 15 ML | Refills: 3 | Status: SHIPPED | OUTPATIENT
Start: 2024-10-08

## 2024-10-08 RX ORDER — FLUTICASONE PROPIONATE 50 MCG
2 SPRAY, SUSPENSION (ML) NASAL DAILY
Qty: 16 G | Refills: 5 | Status: SHIPPED | OUTPATIENT
Start: 2024-10-08

## 2024-10-08 RX ORDER — AZELASTINE 1 MG/ML
2 SPRAY, METERED NASAL 2 TIMES DAILY
Qty: 120 ML | Refills: 1 | Status: SHIPPED
Start: 2024-10-08 | End: 2024-10-08 | Stop reason: CLARIF

## 2024-10-08 RX ORDER — MONTELUKAST SODIUM 10 MG/1
10 TABLET ORAL DAILY
Qty: 30 TABLET | Refills: 3 | Status: SHIPPED | OUTPATIENT
Start: 2024-10-08

## 2024-10-08 RX ORDER — FEXOFENADINE HCL 180 MG/1
180 TABLET ORAL DAILY
Qty: 30 TABLET | Refills: 0 | Status: SHIPPED | OUTPATIENT
Start: 2024-10-08 | End: 2024-11-07

## 2024-10-08 NOTE — PROGRESS NOTES
[facility]  KRISHNA CuadraO. Ms.Ed        Patient Name:  Terri Curry  :  1960     CHIEF C/O:    Chief Complaint   Patient presents with    Follow-up     1 year follow up sinuses. States he has intermittent sinus drainage.        HISTORY OBTAINED FROM:  patient    HISTORY OF PRESENT ILLNESS:       Terri is a 64 y.o. year old male, here today for follow up of:       Patient seen and examined, with known longstanding history of chronic seasonal allergic rhinitis and allergy-like symptoms were controlled from the past Flonase and singular.  He has no prior history significant recurrent epistaxis which he has not had on a regular basis with consistent use of his medications.  No new complaint today of mucoid drainage headaches vision changes sore throat hoarseness shortness of breath stridor tinnitus vertigo or hearing loss.       10/8/24: Pt here for recheck of seasonal allergies and PND. Still using Flonase, Astelin and singulair. Complains of significant drainage over the Summer. No recent sinus infections.     Past Medical History:   Diagnosis Date    Arthritis     Atrial fibrillation (HCC)     Bruising tendency     Cardiomyopathy (HCC)     probably viral, LV systolic dysfunction EF 20% 2011    Cerebral artery occlusion with cerebral infarction (HCC)     TIA    CHF (congestive heart failure) (HCC)     ef 20    Diabetes mellitus (HCC)     Family history of colon cancer 2014    Headache(784.0)     Heart trouble ,     Hospitalized (WHAT TYPE?)    History of blood transfusion     History of frequent ear infections     History of substance abuse (HCC) 10/6/2011    A. Abstinence since  B. H/O significant alcohol consumption     Hx of blood clots     HEART    Hyperlipidemia     Hypertension     Memory loss     Recurrent infections , ,     ASK WHERE?    Speech problem     Vertebrobasilar TIAs 10/15/2015    2012      Past Surgical

## 2024-11-07 ENCOUNTER — APPOINTMENT (OUTPATIENT)
Dept: CARDIOLOGY | Facility: HOSPITAL | Age: 64
End: 2024-11-07
Payer: COMMERCIAL

## 2024-12-12 ENCOUNTER — TELEPHONE (OUTPATIENT)
Dept: CARDIOLOGY | Facility: HOSPITAL | Age: 64
End: 2024-12-12
Payer: COMMERCIAL

## 2024-12-12 ENCOUNTER — HOSPITAL ENCOUNTER (OUTPATIENT)
Dept: CARDIOLOGY | Facility: HOSPITAL | Age: 64
Discharge: HOME | End: 2024-12-12
Payer: COMMERCIAL

## 2024-12-12 DIAGNOSIS — Z95.0 PRESENCE OF CARDIAC PACEMAKER: Primary | ICD-10-CM

## 2024-12-12 DIAGNOSIS — I48.0 PAROXYSMAL ATRIAL FIBRILLATION (MULTI): Primary | Chronic | ICD-10-CM

## 2024-12-12 DIAGNOSIS — I48.20 CHRONIC ATRIAL FIBRILLATION, UNSPECIFIED (MULTI): ICD-10-CM

## 2024-12-12 DIAGNOSIS — I49.5 SINUS NODE DYSFUNCTION (MULTI): ICD-10-CM

## 2024-12-12 DIAGNOSIS — Z95.0 PRESENCE OF CARDIAC PACEMAKER: ICD-10-CM

## 2024-12-12 DIAGNOSIS — I48.0 PAROXYSMAL ATRIAL FIBRILLATION (MULTI): ICD-10-CM

## 2024-12-12 PROCEDURE — 93280 PM DEVICE PROGR EVAL DUAL: CPT

## 2024-12-12 NOTE — TELEPHONE ENCOUNTER
Patient's spouse returned call. The patient stopped Eliquis d/t cost. She agrees to a referral to pharmacy for patient assistance for Eliquis. Patient now scheduled for follow up with Rebecca

## 2024-12-16 ENCOUNTER — APPOINTMENT (OUTPATIENT)
Dept: CARDIOLOGY | Facility: HOSPITAL | Age: 64
End: 2024-12-16
Payer: COMMERCIAL

## 2024-12-16 ENCOUNTER — HOSPITAL ENCOUNTER (OUTPATIENT)
Dept: CARDIOLOGY | Facility: HOSPITAL | Age: 64
Discharge: HOME | End: 2024-12-16
Payer: COMMERCIAL

## 2024-12-16 DIAGNOSIS — I50.32 HEART FAILURE WITH IMPROVED EJECTION FRACTION (HFIMPEF): ICD-10-CM

## 2024-12-16 DIAGNOSIS — I35.0 AORTIC VALVE STENOSIS, ETIOLOGY OF CARDIAC VALVE DISEASE UNSPECIFIED: ICD-10-CM

## 2024-12-16 DIAGNOSIS — I48.0 PAROXYSMAL ATRIAL FIBRILLATION (MULTI): Chronic | ICD-10-CM

## 2024-12-16 PROCEDURE — 93306 TTE W/DOPPLER COMPLETE: CPT

## 2024-12-16 PROCEDURE — 93306 TTE W/DOPPLER COMPLETE: CPT | Performed by: INTERNAL MEDICINE

## 2024-12-17 LAB
AORTIC VALVE MEAN GRADIENT: 12 MMHG
AORTIC VALVE PEAK VELOCITY: 2.33 M/S
AV PEAK GRADIENT: 22 MMHG
AVA (PEAK VEL): 2.06 CM2
AVA (VTI): 2.06 CM2
EJECTION FRACTION APICAL 4 CHAMBER: 62
EJECTION FRACTION: 64 %
LEFT ATRIUM VOLUME AREA LENGTH INDEX BSA: 24.9 ML/M2
LEFT VENTRICLE INTERNAL DIMENSION DIASTOLE: 5.76 CM (ref 3.5–6)
LEFT VENTRICULAR OUTFLOW TRACT DIAMETER: 2.49 CM
LV EJECTION FRACTION BIPLANE: 64 %
MITRAL VALVE E/A RATIO: 0.83
MITRAL VALVE E/E' RATIO: 7.99
RIGHT VENTRICLE FREE WALL PEAK S': 12.34 CM/S
RIGHT VENTRICLE PEAK SYSTOLIC PRESSURE: 11.9 MMHG
TRICUSPID ANNULAR PLANE SYSTOLIC EXCURSION: 2.7 CM

## 2024-12-19 NOTE — PROGRESS NOTES
[facility]  Internal Medicine Residency Program  ACC Note      SUBJECTIVE:  CC: had concerns including Follow-up.    HPI:Terri Curry is a 64 y.o. male who  has a past medical history of Arthritis, Atrial fibrillation (HCC), Bruising tendency, Cardiomyopathy (HCC), Cerebral artery occlusion with cerebral infarction (HCC), CHF (congestive heart failure) (HCC), Diabetes mellitus (HCC), Family history of colon cancer, Headache(784.0), Heart trouble, History of blood transfusion, History of frequent ear infections, History of substance abuse (HCC), Hx of blood clots, Hyperlipidemia, Hypertension, Memory loss, Recurrent infections, Speech problem, and Vertebrobasilar TIAs.     Terri Curry with PMHx of Patient has past medical history of nonvalvular paroxysmal A-fib on Eliquis, HFpEF 50%, TIA, type 2 diabetes, obsecity, migraine/tension type headache, primary knee osteoarthritis presented today for routine follow up.      Interval history:  On office visit 7/28/23, Patient reported he is not taking amitriptyline (ELAVIL) 50MG. He woke up during night few times, sometime due to dreaming. He reported postnasal drip, which related to season. He makes his own cigarettes by himself. He would like nicotin patchy to help. He is doing well, no other complain otherwise.      On office visit 7/5/24, patient reported he has lower back pain radiated to left legs a week ago, he was mowing the grass and doing labor work. The pain is resolved at this time point. Straight leg test was negative.      Type 2 diabetes mellitus with complication, without long-term current use of insulin  - HbA1c 7.0 (7/5/24), 7/28/23 was 7.1 > 7.2 on 3/8/2023  - microalbumin 56, Cr urine 112.5, Microalb/Cr ratio 50  - Dapagliflozin (FARXIGA) 5MG tablet once daily - stopped taking due to finance issue   - linagliptin (TRADJENTA) 5MG tablet once daily - not taking due to side effects   - Currently on Metformin 1000mg tablet

## 2024-12-20 ENCOUNTER — OFFICE VISIT (OUTPATIENT)
Dept: INTERNAL MEDICINE | Age: 64
End: 2024-12-20
Payer: MEDICARE

## 2024-12-20 VITALS
SYSTOLIC BLOOD PRESSURE: 141 MMHG | DIASTOLIC BLOOD PRESSURE: 85 MMHG | TEMPERATURE: 98.2 F | HEART RATE: 72 BPM | OXYGEN SATURATION: 96 % | WEIGHT: 267.7 LBS | RESPIRATION RATE: 18 BRPM | HEIGHT: 75 IN | BODY MASS INDEX: 33.29 KG/M2

## 2024-12-20 DIAGNOSIS — I42.0 DILATED CARDIOMYOPATHY (HCC): ICD-10-CM

## 2024-12-20 DIAGNOSIS — I48.0 PAROXYSMAL ATRIAL FIBRILLATION (HCC): Primary | Chronic | ICD-10-CM

## 2024-12-20 DIAGNOSIS — Z72.0 TOBACCO ABUSE: ICD-10-CM

## 2024-12-20 DIAGNOSIS — K21.9 GASTROESOPHAGEAL REFLUX DISEASE WITHOUT ESOPHAGITIS: ICD-10-CM

## 2024-12-20 DIAGNOSIS — E66.09 CLASS 1 OBESITY DUE TO EXCESS CALORIES WITH BODY MASS INDEX (BMI) OF 33.0 TO 33.9 IN ADULT, UNSPECIFIED WHETHER SERIOUS COMORBIDITY PRESENT: Chronic | ICD-10-CM

## 2024-12-20 DIAGNOSIS — Z95.0 PACEMAKER: ICD-10-CM

## 2024-12-20 DIAGNOSIS — E78.5 DYSLIPIDEMIA: ICD-10-CM

## 2024-12-20 DIAGNOSIS — R51.9 CHRONIC INTRACTABLE HEADACHE, UNSPECIFIED HEADACHE TYPE: Chronic | ICD-10-CM

## 2024-12-20 DIAGNOSIS — J30.1 ALLERGIC RHINITIS DUE TO POLLEN, UNSPECIFIED SEASONALITY: ICD-10-CM

## 2024-12-20 DIAGNOSIS — G89.29 CHRONIC INTRACTABLE HEADACHE, UNSPECIFIED HEADACHE TYPE: Chronic | ICD-10-CM

## 2024-12-20 DIAGNOSIS — G43.019 INTRACTABLE MIGRAINE WITHOUT AURA AND WITHOUT STATUS MIGRAINOSUS: ICD-10-CM

## 2024-12-20 DIAGNOSIS — E66.811 CLASS 1 OBESITY DUE TO EXCESS CALORIES WITH BODY MASS INDEX (BMI) OF 33.0 TO 33.9 IN ADULT, UNSPECIFIED WHETHER SERIOUS COMORBIDITY PRESENT: Chronic | ICD-10-CM

## 2024-12-20 DIAGNOSIS — H54.62 VISION LOSS OF LEFT EYE: ICD-10-CM

## 2024-12-20 DIAGNOSIS — E11.8 TYPE 2 DIABETES MELLITUS WITH COMPLICATION, WITHOUT LONG-TERM CURRENT USE OF INSULIN (HCC): Chronic | ICD-10-CM

## 2024-12-20 PROCEDURE — 99212 OFFICE O/P EST SF 10 MIN: CPT

## 2024-12-20 RX ORDER — FAMOTIDINE 20 MG/1
20 TABLET, FILM COATED ORAL 2 TIMES DAILY
Qty: 180 TABLET | Refills: 1 | Status: SHIPPED | OUTPATIENT
Start: 2024-12-20 | End: 2025-06-18

## 2024-12-20 RX ORDER — SPIRONOLACTONE 25 MG/1
25 TABLET ORAL DAILY
Qty: 90 TABLET | Refills: 1 | Status: SHIPPED | OUTPATIENT
Start: 2024-12-20 | End: 2025-06-18

## 2024-12-20 RX ORDER — LISINOPRIL 10 MG/1
10 TABLET ORAL DAILY
Qty: 90 TABLET | Refills: 1 | Status: SHIPPED | OUTPATIENT
Start: 2024-12-20 | End: 2025-06-18

## 2024-12-20 RX ORDER — ATORVASTATIN CALCIUM 80 MG/1
TABLET, FILM COATED ORAL
Qty: 90 TABLET | Refills: 1 | Status: SHIPPED | OUTPATIENT
Start: 2024-12-20

## 2024-12-20 NOTE — PATIENT INSTRUCTIONS
Dear Terri Curry,      Thank you for coming to your appointment today. I hope we have addressed all of your needs.       Please make sure to do the following:  - Continue your medications as listed.  - We will see each other again in 3 months    Call for a sooner appointment if you develop worsen of symptoms     Have a great day!        Sincerely,  Eunice Block MD  12/20/2024  9:15 AM

## 2024-12-20 NOTE — PROGRESS NOTES
[facility]  Internal Medicine Residency Clinic    Attending Physician Statement  I have discussed the case, including pertinent history and exam findings with the resident physician.  I agree with the assessment, plan and orders as documented by the resident. I have reviewed all pertinent PMHx, PSHx, FamHx, SocialHx, medications, and allergies and updated history as appropriate.    Patient here for routine follow up of medical problems.     HTN  -elevated; patinet did not take his morning medications at this time   -discussed compliance and need to take his medications     Afib with controlled rate   -continue coreg  -patient not taking his eliquis as it is too expensive and patinet not interested in other forms of anticoagulation; but discusing with patinet options of using warfarin and doing home inr   -CHADSVASC >2    HFpEF  -stable   -NHYA I; euvolemic   -continue GDMT     NIDDM2  -controlled; The current medical regimen is effective;  continue present plan and medications.  -dietary and lifestyle modifications     Migraines  -improved at this time; unable to take CGRP; raj sympotms improving with using marajuana recreationally per the patient     HCM  -discussed screening testing and vaccines with the patient     Remainder of medical problems as per resident note.    Qasim Sharp Jr, DO  12/20/24

## 2024-12-29 NOTE — PROGRESS NOTES
Electrophysiology Follow up Visit    History of Present Illness:  Gary Briggs is a 64 y.o. year old male patient with    1. Nonischemic cardiomyopathy: Status post ICD implant. EF is low as 25%. This has since recovered, echo from 2014 with EF 60%. Updated in December 2023 with normal LV function of 55-60%.      2. Sinus node dysfunction: ICD implant downgraded to a pacemaker in May 2017 in [place]     3. Atrial arrhythmias: Status post atrial flutter ablation in 2013.      4. Hypertension     5. Diabetes     We saw patient in 2022 to establish care as he was followed in [place]. Device report showed very low burden of atrial fibrillation. He was started on Eliquis at some point but was switched to Xarelto.   We again saw patient in December 2023. Device check showed 4 brief episodes of AT/AF with longest episode being 3.2 minutes. Episodes are occurring mainly during sleeping hours. He was continued on Xarelto at that time. Would consider treatment if AF burden would increase.  Device check in December 2024 showed <1% AF burden with most episodes occurring during overnight hours. Reportedly not taking OAC.     Patient here for follow up for atrial fibrillation. He is feeling well with no palpitations or syncope. He has not been taking OAC due to cost. He reports he does not qualify for financial assistance. He is compliant with other medications.    Medical History:  He has no past medical history on file.    Surgical History:  He has a past surgical history that includes CT angio coronary art with heartflow if score >30% (11/14/2023).     Social History:  He reports that he has been smoking cigarettes. He has never used smokeless tobacco. He reports current alcohol use. He reports that he does not use drugs.       No family history on file.     ROS:  A 14 point review of systems was done and is negative other than as stated in HPI    Physical Exam  Constitutional:       Appearance: Normal appearance.    Eyes:      Pupils: Pupils are equal, round, and reactive to light.   Cardiovascular:      Rate and Rhythm: Normal rate and regular rhythm.      Heart sounds: Normal heart sounds.   Pulmonary:      Effort: Pulmonary effort is normal.      Breath sounds: Normal breath sounds.   Musculoskeletal:         General: Normal range of motion.   Skin:     General: Skin is warm and dry.   Neurological:      Mental Status: He is alert and oriented to person, place, and time.       Allergies:  Antibiotic [ruywb-nuzqg-jerlfwh-pramoxine], Levofloxacin in d5w, Neomycin-polymyxin-lidocaine, Penicillins, and Sulfa (sulfonamide antibiotics)    Outpatient Medications:  Current Outpatient Medications   Medication Instructions    Aimovig Autoinjector 140 mg, subcutaneous, Every 28 days    albuterol 2.5 mg /3 mL (0.083 %) nebulizer solution 3 mL, Every 6 hours PRN    albuterol 90 mcg/actuation inhaler 2 puffs, Every 6 hours PRN    apixaban (ELIQUIS) 5 mg, oral, 2 times daily    aspirin 81 mg EC tablet Daily RT    atorvastatin (Lipitor) 80 mg tablet 1 tablet, Nightly    azelastine (Astelin) 137 mcg (0.1 %) nasal spray 2 sprays    carvedilol (COREG) 12.5 mg, oral, 2 times daily (morning and late afternoon)    famotidine (Pepcid) 20 mg tablet 1 tablet, Nightly    fluticasone (Flonase) 50 mcg/actuation nasal spray 2 sprays, Daily    furosemide (LASIX) 20 mg, oral, Daily    linaGLIPtin (Tradjenta) 5 mg tablet 1 tablet, Daily    lisinopril 10 mg, Daily RT    metFORMIN (GLUCOPHAGE) 1,000 mg    montelukast (Singulair) 10 mg tablet 1 tablet, Daily    spironolactone (ALDACTONE) 25 mg, 2 times daily         Reviewed Labs:  CBC  Lab Results   Component Value Date    WBC 10.0 10/31/2022    HGB 16.1 10/31/2022    HCT 46.8 10/31/2022    MCV 95 10/31/2022     10/31/2022        Renal Function Panel  Lab Results   Component Value Date    GLUCOSE 178 (H) 11/08/2023     11/08/2023    K 4.2 11/08/2023     11/08/2023    CO2 26 11/08/2023     "ANIONGAP 11 11/08/2023    BUN 17 11/08/2023    CREATININE 1.02 11/08/2023    GFRMALE 67 10/31/2022    CALCIUM 9.3 11/08/2023        CMP  Lab Results   Component Value Date    CALCIUM 9.3 11/08/2023    PROT 6.7 10/31/2022    ALBUMIN 4.2 10/31/2022    AST 20 10/31/2022    ALT 25 10/31/2022    ALKPHOS 53 10/31/2022    BILITOT 0.5 10/31/2022        Mg   No results found for: \"MG\"     Thyroid  No results found for: \"TSH\", \"FREET4\", \"T3FREE\"     Visit Vitals  /78   Pulse 84   Ht 1.905 m (6' 3\")   Wt 119 kg (262 lb)   BMI 32.75 kg/m²   Smoking Status Every Day   BSA 2.51 m²           Cardiac Testing Reviewed Study(s):  Echo (December/2024):   CONCLUSIONS:   1. The left ventricular systolic function is normal, with a Suarez's biplane calculated ejection fraction of 64%.   2. Spectral Doppler shows a normal pattern of left ventricular diastolic filling.   3. There is normal right ventricular global systolic function.   4. Mild aortic valve stenosis.  ECGs (reviewed and my interpretation):   12/30/2024 atrial paced with rate of 84 bpm, VA 212ms and       Assessment  Atrial fibrillation: Paroxysmal in nature. ECG personally reviewed today which shows atrial pacing. Device check earlier this month showed <1% AF burden over the past year. Longest episode 4 hours in July. [id number] score is 3 (htn, HF, and age) and we discussed importance of OAC to mitigate risk of stroke. Patient reports he is not taking OAC due to cost and did not qualify for assistance programs. He is unsure if he has talked with our clinical pharmacy regarding this. I will reach out to our pharmacy to check his eligibility. He is also planning on talking with  after the first of the year about Medicaid. Atrial fibrillation remains minimal though does have brief RVR. We will try to increase his carvedilol and patient will monitor his BP at home. He will notify our office if he has any concerns. Would consider AAD or RFA if " burden would increase. He will continue to follow with general cardiology. We will follow patient through device clinic.     NSVT: brief NSVT noted on device check. Echocardiogram from earlier this month showed normal LV function. Coronary CTA from November 2023 showed normal anatomy. We will attempt to increase carvedilol as mentioned above. He will continue to follow with general cardiology.     Plan  Increase carvedilol to 25mg twice daily  Monitor BP at home  Continue to follow with device clinic as scheduled  Continue to follow with general cardiology    Reviewed with Dr Fontana      Exclusive of any other services or procedures performed, ICate, APRN-CNP , spent 30 minutes in duration for this visit today.  This time consisted of chart review, obtaining history, and/or performing the exam as documented above as well as documenting the clinical information for the encounter in the electronic record, discussing treatment options, plans, and/or goals with patient, family, and/or caregiver, refilling medications, updating the electronic record, ordering medicines, lab work, imaging, referrals, and/or procedures as documented above and communicating with other [facility] professionals. I have discussed the results of laboratory, radiology, and cardiology studies with the patient and their family/caregiver.

## 2024-12-30 ENCOUNTER — OFFICE VISIT (OUTPATIENT)
Dept: CARDIOLOGY | Facility: HOSPITAL | Age: 64
End: 2024-12-30
Payer: COMMERCIAL

## 2024-12-30 VITALS
WEIGHT: 262 LBS | BODY MASS INDEX: 32.58 KG/M2 | HEIGHT: 75 IN | HEART RATE: 84 BPM | SYSTOLIC BLOOD PRESSURE: 120 MMHG | DIASTOLIC BLOOD PRESSURE: 78 MMHG

## 2024-12-30 DIAGNOSIS — I47.29 NSVT (NONSUSTAINED VENTRICULAR TACHYCARDIA) (MULTI): ICD-10-CM

## 2024-12-30 DIAGNOSIS — Z95.0 PRESENCE OF CARDIAC PACEMAKER: ICD-10-CM

## 2024-12-30 DIAGNOSIS — I48.0 PAROXYSMAL ATRIAL FIBRILLATION (MULTI): Primary | ICD-10-CM

## 2024-12-30 PROBLEM — E78.5 DYSLIPIDEMIA: Status: ACTIVE | Noted: 2024-12-20

## 2024-12-30 PROCEDURE — 93005 ELECTROCARDIOGRAM TRACING: CPT | Performed by: NURSE PRACTITIONER

## 2024-12-30 PROCEDURE — 3074F SYST BP LT 130 MM HG: CPT | Performed by: NURSE PRACTITIONER

## 2024-12-30 PROCEDURE — 93010 ELECTROCARDIOGRAM REPORT: CPT | Performed by: STUDENT IN AN ORGANIZED HEALTH CARE EDUCATION/TRAINING PROGRAM

## 2024-12-30 PROCEDURE — 99214 OFFICE O/P EST MOD 30 MIN: CPT | Mod: 25 | Performed by: NURSE PRACTITIONER

## 2024-12-30 PROCEDURE — 3008F BODY MASS INDEX DOCD: CPT | Performed by: NURSE PRACTITIONER

## 2024-12-30 PROCEDURE — 99214 OFFICE O/P EST MOD 30 MIN: CPT | Performed by: NURSE PRACTITIONER

## 2024-12-30 PROCEDURE — 3078F DIAST BP <80 MM HG: CPT | Performed by: NURSE PRACTITIONER

## 2024-12-30 PROCEDURE — 1159F MED LIST DOCD IN RCRD: CPT | Performed by: NURSE PRACTITIONER

## 2024-12-30 PROCEDURE — 4010F ACE/ARB THERAPY RXD/TAKEN: CPT | Performed by: NURSE PRACTITIONER

## 2024-12-30 RX ORDER — IPRATROPIUM BROMIDE 42 UG/1
2 SPRAY, METERED NASAL 4 TIMES DAILY
COMMUNITY
Start: 2024-10-08

## 2024-12-30 NOTE — PATIENT INSTRUCTIONS
Increase carvedilol to 25mg twice daily  Monitor BP at home  Continue to follow with device clinic as scheduled  Continue to follow with general cardiology

## 2025-01-03 ENCOUNTER — APPOINTMENT (OUTPATIENT)
Dept: CARDIOLOGY | Facility: CLINIC | Age: 65
End: 2025-01-03
Payer: COMMERCIAL

## 2025-01-03 VITALS
WEIGHT: 269 LBS | HEIGHT: 75 IN | SYSTOLIC BLOOD PRESSURE: 120 MMHG | DIASTOLIC BLOOD PRESSURE: 78 MMHG | BODY MASS INDEX: 33.45 KG/M2 | OXYGEN SATURATION: 98 % | HEART RATE: 85 BPM

## 2025-01-03 DIAGNOSIS — I48.0 PAF (PAROXYSMAL ATRIAL FIBRILLATION) (MULTI): ICD-10-CM

## 2025-01-03 DIAGNOSIS — I50.32 HEART FAILURE WITH IMPROVED EJECTION FRACTION (HFIMPEF): ICD-10-CM

## 2025-01-03 DIAGNOSIS — I48.0 PAROXYSMAL ATRIAL FIBRILLATION (MULTI): Chronic | ICD-10-CM

## 2025-01-03 DIAGNOSIS — I35.0 AORTIC VALVE STENOSIS, ETIOLOGY OF CARDIAC VALVE DISEASE UNSPECIFIED: ICD-10-CM

## 2025-01-03 PROCEDURE — 93005 ELECTROCARDIOGRAM TRACING: CPT | Performed by: INTERNAL MEDICINE

## 2025-01-03 PROCEDURE — 3008F BODY MASS INDEX DOCD: CPT | Performed by: INTERNAL MEDICINE

## 2025-01-03 PROCEDURE — 99213 OFFICE O/P EST LOW 20 MIN: CPT | Performed by: INTERNAL MEDICINE

## 2025-01-03 PROCEDURE — 4010F ACE/ARB THERAPY RXD/TAKEN: CPT | Performed by: INTERNAL MEDICINE

## 2025-01-03 PROCEDURE — 1160F RVW MEDS BY RX/DR IN RCRD: CPT | Performed by: INTERNAL MEDICINE

## 2025-01-03 PROCEDURE — 3078F DIAST BP <80 MM HG: CPT | Performed by: INTERNAL MEDICINE

## 2025-01-03 PROCEDURE — 3074F SYST BP LT 130 MM HG: CPT | Performed by: INTERNAL MEDICINE

## 2025-01-03 PROCEDURE — 1159F MED LIST DOCD IN RCRD: CPT | Performed by: INTERNAL MEDICINE

## 2025-01-03 RX ORDER — CARVEDILOL 25 MG/1
25 TABLET ORAL
Qty: 180 TABLET | Refills: 3 | Status: SHIPPED | OUTPATIENT
Start: 2025-01-03 | End: 2026-01-03

## 2025-01-03 NOTE — PATIENT INSTRUCTIONS
Continue all current medications as prescribed.  Dr. Marin has recommended that you followup with Clinical Pharmacy to inquire about coverage for Eliquis.  Dr. Marin has ordered blood work to be drawn at your earliest convenience.   Followup with Dr. Marin in 1 year, sooner should any issues or concerns arise before then.     If you have any questions or cardiac concerns, please call our office at [phone].

## 2025-01-03 NOTE — LETTER
"January 3, 2025     Malika Martines MD  [address] [facility] [address]    Patient: Gary Shi   YOB: 1960   Date of Visit: 1/3/2025       Dear Dr. Malika Martines MD:    Thank you for referring Gary Shi to me for evaluation. Below are my notes for this consultation.  If you have questions, please do not hesitate to call me. I look forward to following your patient along with you.       Sincerely,     Eric Marin MD      CC: No Recipients  ______________________________________________________________________________________    Counseling:  The patient was counseled regarding diagnostic results, instructions for management, risk factor reductions, prognosis, patient and family education, impressions, risks and benefits of treatment options and importance of compliance with treatment.      Chief Complaint:   The patient presents today for 6-month followup of aortic stenosis, HFimpEF, NICM and atrial fibrillation s/p echocardiogram.        History Of Present Illness:    Gary Shi is a 64 year old male patient who presents today for 6-month followup of aortic stenosis, HFimpEF, NICM and atrial fibrillation s/p echocardiogram. His PMH is significant for paroxysmal atrial fibrillation. Echocardiogram performed 12/16/2024 demonstrated an EF of 64%, normal RV systolic function and mild aortic stenosis. Over the past 6 months, the patient states that he has done well from a cardiac standpoint. He reports rare chest pain, which per the patient is \"unremarkable.\" He denies any SOB either at rest or with exertion. He also denies any palpitations. BP has been stable. The patient is currently off Eliquis secondary to the cost. He is compliant with all other prescribed medications.     Last Recorded Vitals:  Vitals:    01/03/25 1030   BP: 120/78   BP Location: Left arm   Pulse: 85   SpO2: 98%   Weight: 122 kg (269 lb)   Height: 1.905 m (6' 3\")       Past Surgical History:  He has a " past surgical history that includes CT angio coronary art with heartflow if score >30% (11/14/2023).      Social History:  He reports that he has been smoking cigarettes. He has never used smokeless tobacco. He reports current alcohol use. He reports that he does not use drugs.    Family History:  No family history on file.     Allergies:  Antibiotic [eusld-pjpsn-jcpafpc-pramoxine], Levofloxacin in d5w, Neomycin-polymyxin-lidocaine, Penicillins, Sulfa (sulfonamide antibiotics), and Levofloxacin    Outpatient Medications:  Current Outpatient Medications   Medication Instructions   • Aimovig Autoinjector 140 mg, subcutaneous, Every 28 days   • albuterol 2.5 mg /3 mL (0.083 %) nebulizer solution 3 mL, Every 6 hours PRN   • albuterol 90 mcg/actuation inhaler 2 puffs, Every 6 hours PRN   • apixaban (ELIQUIS) 5 mg, oral, 2 times daily   • aspirin 81 mg EC tablet Daily RT   • atorvastatin (Lipitor) 80 mg tablet 1 tablet, Nightly   • azelastine (Astelin) 137 mcg (0.1 %) nasal spray 2 sprays   • carvedilol (COREG) 12.5 mg, oral, 2 times daily (morning and late afternoon)   • famotidine (Pepcid) 20 mg tablet 1 tablet, Nightly   • fluticasone (Flonase) 50 mcg/actuation nasal spray 2 sprays, Daily   • furosemide (LASIX) 20 mg, oral, Daily   • ipratropium (Atrovent) 42 mcg (0.06 %) nasal spray 2 sprays, 4 times daily   • linaGLIPtin (Tradjenta) 5 mg tablet 1 tablet, Daily   • lisinopril 10 mg, Daily RT   • metFORMIN (GLUCOPHAGE) 1,000 mg   • montelukast (Singulair) 10 mg tablet 1 tablet, Daily   • spironolactone (ALDACTONE) 25 mg, 2 times daily     Review of Systems   Cardiovascular:  Positive for chest pain (rare).   All other systems reviewed and are negative.     Physical Exam:  Constitutional:       Appearance: Healthy appearance. Not in distress.   Neck:      Vascular: No JVR. JVD normal.   Pulmonary:      Effort: Pulmonary effort is normal.      Breath sounds: Normal breath sounds. No wheezing. No rhonchi. No rales.    Chest:      Chest wall: Not tender to palpatation.   Cardiovascular:      PMI at left midclavicular line. Normal rate. Regular rhythm. Normal S1. Normal S2.       Murmurs: There is no murmur.      No gallop.  No click. No rub.   Pulses:     Intact distal pulses.   Edema:     Peripheral edema absent.   Abdominal:      General: Bowel sounds are normal.      Palpations: Abdomen is soft.      Tenderness: There is no abdominal tenderness.   Musculoskeletal: Normal range of motion.         General: No tenderness. Skin:     General: Skin is warm and dry.   Neurological:      General: No focal deficit present.      Mental Status: Alert and oriented to person, place and time.       Last Labs:  CBC -  Lab Results   Component Value Date    WBC 10.0 10/31/2022    HGB 16.1 10/31/2022    HCT 46.8 10/31/2022    MCV 95 10/31/2022     10/31/2022       CMP -  Lab Results   Component Value Date    CALCIUM 9.3 11/08/2023    PROT 6.7 10/31/2022    ALBUMIN 4.2 10/31/2022    AST 20 10/31/2022    ALT 25 10/31/2022    ALKPHOS 53 10/31/2022    BILITOT 0.5 10/31/2022       RENAL FUNCTION PANEL -   Lab Results   Component Value Date    GLUCOSE 178 (H) 11/08/2023     11/08/2023    K 4.2 11/08/2023     11/08/2023    CO2 26 11/08/2023    ANIONGAP 11 11/08/2023    BUN 17 11/08/2023    CREATININE 1.02 11/08/2023    GFRMALE 67 10/31/2022    CALCIUM 9.3 11/08/2023    ALBUMIN 4.2 10/31/2022        Lab Results   Component Value Date    HGBA1C 7.6 (H) 09/16/2021       Last Cardiology Tests:  12/16/2024 - TTE  1. The left ventricular systolic function is normal, with a Suarez's biplane calculated ejection fraction of 64%.  2. Spectral Doppler shows a normal pattern of left ventricular diastolic filling.  3. There is normal right ventricular global systolic function.  4. Mild aortic valve stenosis.    12/13/2023 - TTE  1. Left ventricular systolic function is normal with a 55-60% estimated ejection fraction.  2. Spectral Doppler  "shows an impaired relaxation pattern of left ventricular diastolic filling.  3. Mild aortic valve stenosis.    11/14/2023 - CTA Coronary Arteries  1.  Normal coronary anatomy. Calcium score as above. Diagonal 1 and posterolateral branch of the RCA both of which are small caliber vessels were not opacified well. Soft plaque with 20% stenosis seen in the mid to distal left main as well as ostial RCA.   2. Mild nonobstructive coronary artery disease in rest of the coronaries.  3. Presence of cardiac pacemaker, dual-chamber.    Lab review: I have personally reviewed the laboratory result(s).   Diagnostic review: I have personally reviewed the result(s) of the Echocardiogram.    Assessment/Plan  1) Chest Pain in a Patient with Risk Factors for CAD - Aortic Stenosis  On ASA 81 mg daily, atorvastatin 80 mg daily, carvedilol 25 mg BID, furosemide 20 mg daily, lisinopril 10 mg daily, spironolactone 25 mg BID.  Digoxin previously discontinued  Apparently stress test last year was negative  Lipid panel 03/2023 with LDL of 68, elevated triglycerides of 182  BMP drawn 11/08/2023 was unremarkable   CTA coronary arteries 11/14/2023 with normal coronary anatomy, elevated calcium score of 154.52, soft plaque with 20% stenosis seen in the mid to distal left main as well as ostial RCA and mild nonobstructive CAD.  Patient states that he will occasionally discontinue all medications for a day or two when he feels unwell to \"clear things out\" - educated on the risks associated with suddenly stopping all medications abruptly such as stroke or blood clots.  Secondary risk factor modification   TTE 12/16/2024 with mild aortic stenosis  Reports rate chest pain - \"unremarkable\" per patient  Denies SOB either at rest or with exertion   BP stable  Check Lipid Panel  Continue current medical Rx   F/U 1 year     2) Atrial Fibrillation s/p A-Flutter Ablation in 2013 - SA Node Dysfunction s/p ICD with Subsequent   On carvedilol 25 mg " BID  Downgrade to PPM   Digoxin previously discontinued   Follows with EP and  [facility]  Received ?green alert 12/14/2023 - more AT/AF noted during probable sleep hours  Seen by EP 12/21/2023 - very low A-fib burden (asymptomatic), further treatment not warranted at this time, continue to followup via device clinic with consideration of treatment if AF burden increases significantly.  Seen by EP 12/30/2024 - carvedilol increased to 25 mg BID  Denies palpitations  Patient not taking Eliquis s/t cost - has been seen by Clinical Pharmacy with no options for financial assistance.  Advised to followup with Clinical Pharmacy re: financial assistance for Eliquis   Continue current medical Rx   F/U 1 year     3) HFimpEF - NICM s/p ICD with Subsequent Downgrade to PPM   On carvedilol 25 mg BID, furosemide 20 mg daily, lisinopril 10 mg daily, spironolactone 25 mg BID  ICD implant previously downgraded to PPM at some point - currently has [employer] pacemaker   TTE 12/16/2024 with LVEF 64%, normal RV systolic function  Stable   Continue current medical Rx   F/U 1 year     4) Headaches, Sleep Disturbance  Referral previously placed to neurology as per patient's request      Scribe Attestation  By signing my name below, I, Jazmín Diaz, Scrabilio   attest that this documentation has been prepared under the direction and in the presence of Eric Marin MD.

## 2025-01-03 NOTE — PROGRESS NOTES
"Counseling:  The patient was counseled regarding diagnostic results, instructions for management, risk factor reductions, prognosis, patient and family education, impressions, risks and benefits of treatment options and importance of compliance with treatment.      Chief Complaint:   The patient presents today for 6-month followup of aortic stenosis, HFimpEF, NICM and atrial fibrillation s/p echocardiogram.        History Of Present Illness:    Gary Shi is a 64 year old male patient who presents today for 6-month followup of aortic stenosis, HFimpEF, NICM and atrial fibrillation s/p echocardiogram. His PMH is significant for paroxysmal atrial fibrillation. Echocardiogram performed 12/16/2024 demonstrated an EF of 64%, normal RV systolic function and mild aortic stenosis. Over the past 6 months, the patient states that he has done well from a cardiac standpoint. He reports rare chest pain, which per the patient is \"unremarkable.\" He denies any SOB either at rest or with exertion. He also denies any palpitations. BP has been stable. The patient is currently off Eliquis secondary to the cost. He is compliant with all other prescribed medications.     Last Recorded Vitals:  Vitals:    01/03/25 1030   BP: 120/78   BP Location: Left arm   Pulse: 85   SpO2: 98%   Weight: 122 kg (269 lb)   Height: 1.905 m (6' 3\")       Past Surgical History:  He has a past surgical history that includes CT angio coronary art with heartflow if score >30% (11/14/2023).      Social History:  He reports that he has been smoking cigarettes. He has never used smokeless tobacco. He reports current alcohol use. He reports that he does not use drugs.    Family History:  No family history on file.     Allergies:  Antibiotic [lcjuh-gbtib-mbsytwx-pramoxine], Levofloxacin in d5w, Neomycin-polymyxin-lidocaine, Penicillins, Sulfa (sulfonamide antibiotics), and Levofloxacin    Outpatient Medications:  Current Outpatient Medications   Medication " Instructions    Aimovig Autoinjector 140 mg, subcutaneous, Every 28 days    albuterol 2.5 mg /3 mL (0.083 %) nebulizer solution 3 mL, Every 6 hours PRN    albuterol 90 mcg/actuation inhaler 2 puffs, Every 6 hours PRN    apixaban (ELIQUIS) 5 mg, oral, 2 times daily    aspirin 81 mg EC tablet Daily RT    atorvastatin (Lipitor) 80 mg tablet 1 tablet, Nightly    azelastine (Astelin) 137 mcg (0.1 %) nasal spray 2 sprays    carvedilol (COREG) 12.5 mg, oral, 2 times daily (morning and late afternoon)    famotidine (Pepcid) 20 mg tablet 1 tablet, Nightly    fluticasone (Flonase) 50 mcg/actuation nasal spray 2 sprays, Daily    furosemide (LASIX) 20 mg, oral, Daily    ipratropium (Atrovent) 42 mcg (0.06 %) nasal spray 2 sprays, 4 times daily    linaGLIPtin (Tradjenta) 5 mg tablet 1 tablet, Daily    lisinopril 10 mg, Daily RT    metFORMIN (GLUCOPHAGE) 1,000 mg    montelukast (Singulair) 10 mg tablet 1 tablet, Daily    spironolactone (ALDACTONE) 25 mg, 2 times daily     Review of Systems   Cardiovascular:  Positive for chest pain (rare).   All other systems reviewed and are negative.     Physical Exam:  Constitutional:       Appearance: Healthy appearance. Not in distress.   Neck:      Vascular: No JVR. JVD normal.   Pulmonary:      Effort: Pulmonary effort is normal.      Breath sounds: Normal breath sounds. No wheezing. No rhonchi. No rales.   Chest:      Chest wall: Not tender to palpatation.   Cardiovascular:      PMI at left midclavicular line. Normal rate. Regular rhythm. Normal S1. Normal S2.       Murmurs: There is no murmur.      No gallop.  No click. No rub.   Pulses:     Intact distal pulses.   Edema:     Peripheral edema absent.   Abdominal:      General: Bowel sounds are normal.      Palpations: Abdomen is soft.      Tenderness: There is no abdominal tenderness.   Musculoskeletal: Normal range of motion.         General: No tenderness. Skin:     General: Skin is warm and dry.   Neurological:      General: No focal  deficit present.      Mental Status: Alert and oriented to person, place and time.       Last Labs:  CBC -  Lab Results   Component Value Date    WBC 10.0 10/31/2022    HGB 16.1 10/31/2022    HCT 46.8 10/31/2022    MCV 95 10/31/2022     10/31/2022       CMP -  Lab Results   Component Value Date    CALCIUM 9.3 11/08/2023    PROT 6.7 10/31/2022    ALBUMIN 4.2 10/31/2022    AST 20 10/31/2022    ALT 25 10/31/2022    ALKPHOS 53 10/31/2022    BILITOT 0.5 10/31/2022       RENAL FUNCTION PANEL -   Lab Results   Component Value Date    GLUCOSE 178 (H) 11/08/2023     11/08/2023    K 4.2 11/08/2023     11/08/2023    CO2 26 11/08/2023    ANIONGAP 11 11/08/2023    BUN 17 11/08/2023    CREATININE 1.02 11/08/2023    GFRMALE 67 10/31/2022    CALCIUM 9.3 11/08/2023    ALBUMIN 4.2 10/31/2022        Lab Results   Component Value Date    HGBA1C 7.6 (H) 09/16/2021       Last Cardiology Tests:  12/16/2024 - TTE  1. The left ventricular systolic function is normal, with a Suarez's biplane calculated ejection fraction of 64%.  2. Spectral Doppler shows a normal pattern of left ventricular diastolic filling.  3. There is normal right ventricular global systolic function.  4. Mild aortic valve stenosis.    12/13/2023 - TTE  1. Left ventricular systolic function is normal with a 55-60% estimated ejection fraction.  2. Spectral Doppler shows an impaired relaxation pattern of left ventricular diastolic filling.  3. Mild aortic valve stenosis.    11/14/2023 - CTA Coronary Arteries  1.  Normal coronary anatomy. Calcium score as above. Diagonal 1 and posterolateral branch of the RCA both of which are small caliber vessels were not opacified well. Soft plaque with 20% stenosis seen in the mid to distal left main as well as ostial RCA.   2. Mild nonobstructive coronary artery disease in rest of the coronaries.  3. Presence of cardiac pacemaker, dual-chamber.    Lab review: I have personally reviewed the laboratory result(s).  "  Diagnostic review: I have personally reviewed the result(s) of the Echocardiogram.    Assessment/Plan   1) Chest Pain in a Patient with Risk Factors for CAD - Aortic Stenosis  On ASA 81 mg daily, atorvastatin 80 mg daily, carvedilol 25 mg BID, furosemide 20 mg daily, lisinopril 10 mg daily, spironolactone 25 mg BID.  Digoxin previously discontinued  Apparently stress test last year was negative  Lipid panel 03/2023 with LDL of 68, elevated triglycerides of 182  BMP drawn 11/08/2023 was unremarkable   CTA coronary arteries 11/14/2023 with normal coronary anatomy, elevated calcium score of 154.52, soft plaque with 20% stenosis seen in the mid to distal left main as well as ostial RCA and mild nonobstructive CAD.  Patient states that he will occasionally discontinue all medications for a day or two when he feels unwell to \"clear things out\" - educated on the risks associated with suddenly stopping all medications abruptly such as stroke or blood clots.  Secondary risk factor modification   TTE 12/16/2024 with mild aortic stenosis  Reports rate chest pain - \"unremarkable\" per patient  Denies SOB either at rest or with exertion   BP stable  Check Lipid Panel  Continue current medical Rx   F/U 1 year     2) Atrial Fibrillation s/p A-Flutter Ablation in 2013 - SA Node Dysfunction s/p ICD with Subsequent   On carvedilol 25 mg BID  Downgrade to PPM   Digoxin previously discontinued   Follows with EP and  [facility]  Received ?green alert 12/14/2023 - more AT/AF noted during probable sleep hours  Seen by EP 12/21/2023 - very low A-fib burden (asymptomatic), further treatment not warranted at this time, continue to followup via device clinic with consideration of treatment if AF burden increases significantly.  Seen by EP 12/30/2024 - carvedilol increased to 25 mg BID  Denies palpitations  Patient not taking Eliquis s/t cost - has been seen by Clinical Pharmacy with no options for financial assistance.  Advised " to followup with Clinical Pharmacy re: financial assistance for Eliquis   Continue current medical Rx   F/U 1 year     3) HFimpEF - NICM s/p ICD with Subsequent Downgrade to PPM   On carvedilol 25 mg BID, furosemide 20 mg daily, lisinopril 10 mg daily, spironolactone 25 mg BID  ICD implant previously downgraded to PPM at some point - currently has Medtronic pacemaker   TTE 12/16/2024 with LVEF 64%, normal RV systolic function  Stable   Continue current medical Rx   F/U 1 year     4) Headaches, Sleep Disturbance  Referral previously placed to neurology as per patient's request      Scribe Attestation  By signing my name below, I, Jazmín Diaz, Scribe   attest that this documentation has been prepared under the direction and in the presence of Eric Marin MD.

## 2025-01-05 LAB
ATRIAL RATE: 84 BPM
P AXIS: 57 DEGREES
P OFFSET: 179 MS
P ONSET: 120 MS
PR INTERVAL: 212 MS
Q ONSET: 224 MS
QRS COUNT: 14 BEATS
QRS DURATION: 104 MS
QT INTERVAL: 384 MS
QTC CALCULATION(BAZETT): 453 MS
QTC FREDERICIA: 429 MS
R AXIS: 46 DEGREES
T AXIS: -16 DEGREES
T OFFSET: 416 MS
VENTRICULAR RATE: 84 BPM

## 2025-02-10 NOTE — PATIENT INSTRUCTIONS
Discontinue digoxin.  Continue all other medications as prescribed.   Followup with Marlena Rivera NP, in 6 months.      If you have any questions or cardiac concerns, please call our office at [phone].   
4

## 2025-02-24 ENCOUNTER — TELEPHONE (OUTPATIENT)
Dept: INTERNAL MEDICINE | Age: 65
End: 2025-02-24

## 2025-02-24 DIAGNOSIS — J30.1 ALLERGIC RHINITIS DUE TO POLLEN, UNSPECIFIED SEASONALITY: Primary | ICD-10-CM

## 2025-02-24 NOTE — TELEPHONE ENCOUNTER
Patient needs an order for an mask and hose for his nebulizer. Patient wants orders sent to [employer]. Fax number is [phone]. Please advise.

## 2025-03-18 DIAGNOSIS — J30.1 ALLERGIC RHINITIS DUE TO POLLEN, UNSPECIFIED SEASONALITY: ICD-10-CM

## 2025-03-19 RX ORDER — MONTELUKAST SODIUM 10 MG/1
TABLET ORAL
Qty: 30 TABLET | Refills: 5 | Status: SHIPPED | OUTPATIENT
Start: 2025-03-19

## 2025-04-08 ENCOUNTER — HOSPITAL ENCOUNTER (OUTPATIENT)
Dept: CARDIOLOGY | Facility: HOSPITAL | Age: 65
Discharge: HOME | End: 2025-04-08
Payer: COMMERCIAL

## 2025-04-08 DIAGNOSIS — I49.5 SINUS NODE DYSFUNCTION (MULTI): ICD-10-CM

## 2025-04-08 DIAGNOSIS — Z95.0 PRESENCE OF CARDIAC PACEMAKER: ICD-10-CM

## 2025-04-08 DIAGNOSIS — I48.0 PAROXYSMAL ATRIAL FIBRILLATION (MULTI): ICD-10-CM

## 2025-04-08 PROCEDURE — 93296 REM INTERROG EVL PM/IDS: CPT

## 2025-04-11 ENCOUNTER — TELEPHONE (OUTPATIENT)
Dept: CARDIOLOGY | Facility: HOSPITAL | Age: 65
End: 2025-04-11
Payer: COMMERCIAL

## 2025-04-11 NOTE — TELEPHONE ENCOUNTER
Called and spoke with pt wife Leeann to schedule follow up with EP regarding recent device check. Pt wife agreeable to 4/23 9am PMC with Cate ZHU and will relay message to pt.

## 2025-04-22 NOTE — PROGRESS NOTES
Electrophysiology Follow up Visit    History of Present Illness:  Gary Briggs is a 64 y.o. year old male patient with past medical history of atrial fibrillation/flutter, nonsustained ventricular tachycardia, nonischemic cardiomyopathy with recovery, sinus node dysfunction s/p pacemaker, hypertension and diabetes.    Patient was referred to electrophysiology in 2022 to establish care as he was previously followed in [place].  Originally had ICD implanted for nonischemic cardiomyopathy.  EF recovered and normalized.  Echo from 2014 showed LV function 60%.  Appears patient had atrial flutter ablation in 2013.  2011 patient was found to have sinus node dysfunction and ICD was downgraded to pacemaker in [place].  Device report in 2022 showed very low burden of atrial fibrillation.  He was started on Eliquis at some point but was switched to Xarelto.  At further follow-ups patient continued to have brief episodes of atrial fibrillation mainly during overnight hours.  He had stopped OAC at some point.  [id number] score of at least 3 (HTN, HF, and age).  Device checks also showed brief NSVT.  Echocardiograms continue to show normal LV function.  Coronary CTA from November 2023 showed normal coronary anatomy.  Carvedilol was increased to 25 mg.    Patient here for follow up for NSVT and atrial fibrillation. He is feeling well with no palpitations. He is having difficulty with memory which neurology relating it to his history of multiple concussions. He denies syncope. He is not taking Eliquis due to financial concerns even after evaluation through pharmacy.     Medical History:  He has no past medical history on file.    Surgical History:  He has a past surgical history that includes CT angio coronary art with heartflow if score >30% (11/14/2023).     Social History:  He reports that he has been smoking cigarettes. He has never used smokeless tobacco. He reports current alcohol use. He reports that he does not  use drugs.       Family History[1]     ROS:  A 14 point review of systems was done and is negative other than as stated in HPI    Physical Exam  Constitutional:       Appearance: Normal appearance.   Eyes:      Pupils: Pupils are equal, round, and reactive to light.   Cardiovascular:      Rate and Rhythm: Normal rate and regular rhythm.      Heart sounds: Normal heart sounds.   Pulmonary:      Effort: Pulmonary effort is normal.      Breath sounds: Normal breath sounds.   Musculoskeletal:         General: Normal range of motion.   Skin:     General: Skin is warm and dry.   Neurological:      Mental Status: He is alert and oriented to person, place, and time.       Allergies:  Antibiotic [rjdzq-bngsd-kwqnzsl-pramoxine], Levofloxacin in d5w, Neomycin-polymyxin-lidocaine, Penicillins, Sulfa (sulfonamide antibiotics), and Levofloxacin    Outpatient Medications:  Current Outpatient Medications   Medication Instructions    Aimovig Autoinjector 140 mg, subcutaneous, Every 28 days    albuterol 2.5 mg /3 mL (0.083 %) nebulizer solution 3 mL, Every 6 hours PRN    albuterol 90 mcg/actuation inhaler 2 puffs, Every 6 hours PRN    apixaban (ELIQUIS) 5 mg, oral, 2 times daily    aspirin 81 mg EC tablet Daily RT    atorvastatin (Lipitor) 80 mg tablet 1 tablet, Nightly    azelastine (Astelin) 137 mcg (0.1 %) nasal spray 2 sprays    carvedilol (COREG) 25 mg, oral, 2 times daily (morning and late afternoon)    famotidine (Pepcid) 20 mg tablet 1 tablet, Nightly    fluticasone (Flonase) 50 mcg/actuation nasal spray 2 sprays, Daily    furosemide (LASIX) 20 mg, oral, Daily    ipratropium (Atrovent) 42 mcg (0.06 %) nasal spray 2 sprays, 4 times daily    linaGLIPtin (Tradjenta) 5 mg tablet 1 tablet, Daily    lisinopril 10 mg, Daily RT    metFORMIN (GLUCOPHAGE) 1,000 mg    montelukast (Singulair) 10 mg tablet 1 tablet, Daily    spironolactone (ALDACTONE) 25 mg, 2 times daily         Reviewed Labs:  CBC  Lab Results   Component Value Date     "WBC 10.0 10/31/2022    HGB 16.1 10/31/2022    HCT 46.8 10/31/2022    MCV 95 10/31/2022     10/31/2022        Renal Function Panel  Lab Results   Component Value Date    GLUCOSE 178 (H) 11/08/2023     11/08/2023    K 4.2 11/08/2023     11/08/2023    CO2 26 11/08/2023    ANIONGAP 11 11/08/2023    BUN 17 11/08/2023    CREATININE 1.02 11/08/2023    GFRMALE 67 10/31/2022    CALCIUM 9.3 11/08/2023        CMP  Lab Results   Component Value Date    CALCIUM 9.3 11/08/2023    PROT 6.7 10/31/2022    ALBUMIN 4.2 10/31/2022    AST 20 10/31/2022    ALT 25 10/31/2022    ALKPHOS 53 10/31/2022    BILITOT 0.5 10/31/2022        Mg   No results found for: \"MG\"     Thyroid  No results found for: \"TSH\", \"FREET4\", \"T3FREE\"     Visit Vitals  /80   Pulse 76   Ht 1.905 m (6' 3\")   Wt 122 kg (269 lb)   BMI 33.62 kg/m²   Smoking Status Every Day   BSA 2.54 m²           Cardiac Testing Reviewed Study(s):  Echo (December/2024):   CONCLUSIONS:   1. The left ventricular systolic function is normal, with a Suarez's biplane calculated ejection fraction of 64%.   2. Spectral Doppler shows a normal pattern of left ventricular diastolic filling.   3. There is normal right ventricular global systolic function.   4. Mild aortic valve stenosis.  ECGs (reviewed and my interpretation):   12/30/2024 atrial paced with rate of 84 bpm, DC 212ms and   4/23/2024 atrial paced at 76 bpm    Assessment  Atrial fibrillation/flutter:  Remote history of atrial flutter ablation in 2013 in [place]  Developed atrial fibrillation with low burden prior to 2022  OAC started as [id number] score is at least 3 (HTN, HF, age) though patient was not taking OAC due to financial constraints.  And was referred to pharmacy for assistance  Echocardiogram December 2024 showed normal LV function of 64%  Minimal atrial fibrillation burden with no AAD    ECG today shows atrially paced rhythm.  Device check from earlier this month shows <1% AF burden " with multiple brief episodes at the end of December 2024.  Labs reviewed.  Discussed the need for OAC to mitigate risk of stroke due to elevated [id number] score.  Patient reports Eliquis is nearly $200/month and he cannot afford it.  He has met with  pharmacy also.  We discussed possible use of warfarin though patient is not inclined to start warfarin at this time.  Encouraged him to follow-up with pharmacy again.  Will continue to follow atrial fibrillation burden through device clinic.  Follow-up in 1 year.    NSVT:  Noted on device check with brief episodes  Echocardiogram December 2024 showed normal LV function 64%  Coronary CTA from November 2023 showed normal coronary anatomy  Carvedilol was increased to 25 mg in December 2024    Review of device check shows patient had 1 episode of NSVT in the past 4 months.  Labs reviewed.  Patient with minimal NSVT.  Will continue carvedilol at this time.  Could consider AAD if patient would have more frequent or prolonged episodes of NSVT.  Can consider upgrading his pacemaker to ICD if he would develop ventricular tachycardia.  We will follow-up in 1 year.    ICD/pacemaker:  Reportedly had ICD implanted due to nonischemic cardiomyopathy with EF of 25%  October 2011 appears device was downgraded to Medtronic dual-chamber pacemaker for sinus node dysfunction ([place])    Device check from earlier this month showed device functioning properly.  2 years remaining on battery life.  Left chest device is well-healed with no erythema or drainage.  Next device check scheduled in July.      Plan  Continue carvedilol 25 mg twice daily  Restart Eliquis and follow up with pharmacy  Follow up with device clinic as scheduled  Follow up in 1 year      Exclusive of any other services or procedures performed, Cate LIN APRN-CNP , spent 30 minutes in duration for this visit today.  This time consisted of chart review, obtaining history, and/or performing the exam as  documented above as well as documenting the clinical information for the encounter in the electronic record, discussing treatment options, plans, and/or goals with patient, family, and/or caregiver, refilling medications, updating the electronic record, ordering medicines, lab work, imaging, referrals, and/or procedures as documented above and communicating with other [facility] professionals. I have discussed the results of laboratory, radiology, and cardiology studies with the patient and their family/caregiver.          [1] No family history on file.

## 2025-04-23 ENCOUNTER — OFFICE VISIT (OUTPATIENT)
Dept: CARDIOLOGY | Facility: HOSPITAL | Age: 65
End: 2025-04-23
Payer: COMMERCIAL

## 2025-04-23 VITALS
BODY MASS INDEX: 33.45 KG/M2 | HEART RATE: 76 BPM | HEIGHT: 75 IN | DIASTOLIC BLOOD PRESSURE: 80 MMHG | WEIGHT: 269 LBS | SYSTOLIC BLOOD PRESSURE: 114 MMHG

## 2025-04-23 DIAGNOSIS — I48.0 PAF (PAROXYSMAL ATRIAL FIBRILLATION) (MULTI): Primary | ICD-10-CM

## 2025-04-23 DIAGNOSIS — Z95.0 PRESENCE OF CARDIAC PACEMAKER: ICD-10-CM

## 2025-04-23 LAB
ATRIAL RATE: 76 BPM
P AXIS: -15 DEGREES
P OFFSET: 185 MS
P ONSET: 106 MS
PR INTERVAL: 212 MS
Q ONSET: 206 MS
QRS COUNT: 13 BEATS
QRS DURATION: 102 MS
QT INTERVAL: 390 MS
QTC CALCULATION(BAZETT): 438 MS
QTC FREDERICIA: 421 MS
R AXIS: 78 DEGREES
T AXIS: -45 DEGREES
T OFFSET: 401 MS
VENTRICULAR RATE: 76 BPM

## 2025-04-23 PROCEDURE — 99214 OFFICE O/P EST MOD 30 MIN: CPT | Performed by: NURSE PRACTITIONER

## 2025-04-23 PROCEDURE — 3079F DIAST BP 80-89 MM HG: CPT | Performed by: NURSE PRACTITIONER

## 2025-04-23 PROCEDURE — 99214 OFFICE O/P EST MOD 30 MIN: CPT | Mod: 25 | Performed by: NURSE PRACTITIONER

## 2025-04-23 PROCEDURE — 3008F BODY MASS INDEX DOCD: CPT | Performed by: NURSE PRACTITIONER

## 2025-04-23 PROCEDURE — 3074F SYST BP LT 130 MM HG: CPT | Performed by: NURSE PRACTITIONER

## 2025-04-23 PROCEDURE — 4010F ACE/ARB THERAPY RXD/TAKEN: CPT | Performed by: NURSE PRACTITIONER

## 2025-04-23 PROCEDURE — 93005 ELECTROCARDIOGRAM TRACING: CPT | Performed by: NURSE PRACTITIONER

## 2025-04-23 PROCEDURE — 1159F MED LIST DOCD IN RCRD: CPT | Performed by: NURSE PRACTITIONER

## 2025-04-23 NOTE — PATIENT INSTRUCTIONS
Continue carvedilol 25 mg twice daily  Restart Eliquis and follow up with pharmacy  Follow up with device clinic as scheduled  Follow up in 1 year

## 2025-06-03 ENCOUNTER — HOSPITAL ENCOUNTER (OUTPATIENT)
Dept: CARDIOLOGY | Facility: HOSPITAL | Age: 65
Discharge: HOME | End: 2025-06-03
Payer: COMMERCIAL

## 2025-06-03 DIAGNOSIS — Z95.0 PRESENCE OF CARDIAC PACEMAKER: ICD-10-CM

## 2025-06-03 DIAGNOSIS — I48.0 PAROXYSMAL ATRIAL FIBRILLATION (MULTI): ICD-10-CM

## 2025-06-03 DIAGNOSIS — I49.5 SINUS NODE DYSFUNCTION (MULTI): ICD-10-CM

## 2025-06-05 DIAGNOSIS — I42.0 DILATED CARDIOMYOPATHY (HCC): ICD-10-CM

## 2025-06-05 RX ORDER — FUROSEMIDE 20 MG/1
20 TABLET ORAL DAILY
Qty: 90 TABLET | Refills: 1 | Status: SHIPPED | OUTPATIENT
Start: 2025-06-05 | End: 2025-12-02

## 2025-06-12 SDOH — ECONOMIC STABILITY: INCOME INSECURITY: IN THE LAST 12 MONTHS, WAS THERE A TIME WHEN YOU WERE NOT ABLE TO PAY THE MORTGAGE OR RENT ON TIME?: PATIENT DECLINED

## 2025-06-12 SDOH — ECONOMIC STABILITY: FOOD INSECURITY: WITHIN THE PAST 12 MONTHS, YOU WORRIED THAT YOUR FOOD WOULD RUN OUT BEFORE YOU GOT MONEY TO BUY MORE.: PATIENT DECLINED

## 2025-06-12 SDOH — ECONOMIC STABILITY: FOOD INSECURITY: WITHIN THE PAST 12 MONTHS, THE FOOD YOU BOUGHT JUST DIDN'T LAST AND YOU DIDN'T HAVE MONEY TO GET MORE.: PATIENT DECLINED

## 2025-06-12 ASSESSMENT — LIFESTYLE VARIABLES
HOW OFTEN DO YOU HAVE A DRINK CONTAINING ALCOHOL: NEVER
HOW MANY STANDARD DRINKS CONTAINING ALCOHOL DO YOU HAVE ON A TYPICAL DAY: PATIENT DOES NOT DRINK

## 2025-06-12 ASSESSMENT — PATIENT HEALTH QUESTIONNAIRE - PHQ9
SUM OF ALL RESPONSES TO PHQ QUESTIONS 1-9: 0
2. FEELING DOWN, DEPRESSED OR HOPELESS: NOT AT ALL
SUM OF ALL RESPONSES TO PHQ QUESTIONS 1-9: 0
SUM OF ALL RESPONSES TO PHQ QUESTIONS 1-9: 0
SUM OF ALL RESPONSES TO PHQ9 QUESTIONS 1 & 2: 0
2. FEELING DOWN, DEPRESSED OR HOPELESS: NOT AT ALL
1. LITTLE INTEREST OR PLEASURE IN DOING THINGS: NOT AT ALL
SUM OF ALL RESPONSES TO PHQ QUESTIONS 1-9: 0
1. LITTLE INTEREST OR PLEASURE IN DOING THINGS: NOT AT ALL

## 2025-06-13 ENCOUNTER — TELEPHONE (OUTPATIENT)
Age: 65
End: 2025-06-13

## 2025-06-13 ENCOUNTER — OFFICE VISIT (OUTPATIENT)
Age: 65
End: 2025-06-13
Payer: COMMERCIAL

## 2025-06-13 VITALS
SYSTOLIC BLOOD PRESSURE: 112 MMHG | BODY MASS INDEX: 32.22 KG/M2 | OXYGEN SATURATION: 96 % | WEIGHT: 259.1 LBS | TEMPERATURE: 97.1 F | RESPIRATION RATE: 20 BRPM | HEIGHT: 75 IN | DIASTOLIC BLOOD PRESSURE: 78 MMHG | HEART RATE: 72 BPM

## 2025-06-13 DIAGNOSIS — G89.29 CHRONIC NONINTRACTABLE HEADACHE, UNSPECIFIED HEADACHE TYPE: Chronic | ICD-10-CM

## 2025-06-13 DIAGNOSIS — Z13.6 SCREENING FOR AAA (ABDOMINAL AORTIC ANEURYSM): Primary | ICD-10-CM

## 2025-06-13 DIAGNOSIS — Z72.0 TOBACCO ABUSE: ICD-10-CM

## 2025-06-13 DIAGNOSIS — Z13.6 ENCOUNTER FOR ABDOMINAL AORTIC ANEURYSM (AAA) SCREENING: Primary | ICD-10-CM

## 2025-06-13 DIAGNOSIS — J30.1 ALLERGIC RHINITIS DUE TO POLLEN, UNSPECIFIED SEASONALITY: ICD-10-CM

## 2025-06-13 DIAGNOSIS — M17.0 PRIMARY OSTEOARTHRITIS OF BOTH KNEES: ICD-10-CM

## 2025-06-13 DIAGNOSIS — E11.8 TYPE 2 DIABETES MELLITUS WITH COMPLICATION, WITHOUT LONG-TERM CURRENT USE OF INSULIN (HCC): Chronic | ICD-10-CM

## 2025-06-13 DIAGNOSIS — E78.5 DYSLIPIDEMIA: ICD-10-CM

## 2025-06-13 DIAGNOSIS — K21.9 GASTROESOPHAGEAL REFLUX DISEASE WITHOUT ESOPHAGITIS: ICD-10-CM

## 2025-06-13 DIAGNOSIS — K63.5 POLYP OF COLON, UNSPECIFIED PART OF COLON, UNSPECIFIED TYPE: ICD-10-CM

## 2025-06-13 DIAGNOSIS — I42.8 CARDIOMYOPATHY, NONISCHEMIC (HCC): Chronic | ICD-10-CM

## 2025-06-13 DIAGNOSIS — I49.5 SINUS NODE DYSFUNCTION (HCC): ICD-10-CM

## 2025-06-13 DIAGNOSIS — I48.0 PAROXYSMAL ATRIAL FIBRILLATION (HCC): Chronic | ICD-10-CM

## 2025-06-13 DIAGNOSIS — H54.62 VISION LOSS OF LEFT EYE: ICD-10-CM

## 2025-06-13 DIAGNOSIS — I42.0 DILATED CARDIOMYOPATHY (HCC): ICD-10-CM

## 2025-06-13 DIAGNOSIS — R51.9 CHRONIC NONINTRACTABLE HEADACHE, UNSPECIFIED HEADACHE TYPE: Chronic | ICD-10-CM

## 2025-06-13 LAB — HBA1C MFR BLD: 6.8 %

## 2025-06-13 PROCEDURE — 99213 OFFICE O/P EST LOW 20 MIN: CPT

## 2025-06-13 PROCEDURE — 1123F ACP DISCUSS/DSCN MKR DOCD: CPT

## 2025-06-13 PROCEDURE — 83036 HEMOGLOBIN GLYCOSYLATED A1C: CPT

## 2025-06-13 PROCEDURE — 3044F HG A1C LEVEL LT 7.0%: CPT

## 2025-06-13 RX ORDER — SPIRONOLACTONE 25 MG/1
25 TABLET ORAL DAILY
Qty: 90 TABLET | Refills: 1 | Status: SHIPPED | OUTPATIENT
Start: 2025-06-13 | End: 2025-12-10

## 2025-06-13 RX ORDER — ATORVASTATIN CALCIUM 80 MG/1
TABLET, FILM COATED ORAL
Qty: 90 TABLET | Refills: 1 | Status: SHIPPED | OUTPATIENT
Start: 2025-06-13

## 2025-06-13 NOTE — TELEPHONE ENCOUNTER
Central scheduling called and needed order for AAA screening changed Dr Sharp changed order per request for pt to schedule.

## 2025-06-13 NOTE — PATIENT INSTRUCTIONS
Dear Terri Curry,      Thank you for coming to your appointment today. I hope we have addressed all of your needs.       Please make sure to do the following:  - Continue your medications as listed.  - Get labs drawn before our next follow up. We will call you with the results  - Referrals have been made to Aston Molina MD, General Surgery, [place]:  If you do not hear from the office in 1 week, please call the number listed.        Aston Molina MD, General Surgery, [place]        - We will see each other again in 6 months    Call for a sooner appointment if you develop worsen of symptoms    Have a great day!        Sincerely,  Eunice Block MD  6/13/2025  9:44 AM

## 2025-06-13 NOTE — PROGRESS NOTES
[facility]  Internal Medicine Residency Clinic    Attending Physician Statement  I have discussed the case, including pertinent history and exam findings with the resident physician.  I agree with the assessment, plan and orders as documented by the resident. I have reviewed all pertinent PMHx, PSHx, FamHx, SocialHx, medications, and allergies and updated history as appropriate.    Patient here for routine follow up of medical problems.     HTN  -controlled; The current medical regimen is effective;  continue present plan and medications.     NIDDM2  -metformin  -A1c improving   -continue dietary and exercise modifications     OA  -stable; not requiring any medications   -raj deferring any PT or orthopedic management     Chronic Left Eye Vision Loss  -stable; following with his ophthalmologist     HCM  -due for colonoscopy and AAA screening   -raj has polyps and needs repeat   -AWV at next visit     Remainder of medical problems as per resident note.    Qasim Sharp Jr, DO  6/13/25    
2 sprays by Nasal route 2 times daily Use in each nostril as directed 30 mL 1    fluticasone (FLONASE) 50 MCG/ACT nasal spray INHALE TWO SPRAYS INTO EACH NOSTRIL ONCE DAILY 1 each 2    carvedilol (COREG) 12.5 MG tablet Take 1 tablet by mouth 2 times daily (with meals) 90 tablet 3    Misc. Devices MISC 1 each by Does not apply route once as needed (mask for nebulizer) Nebulizer and tubing. 1 each 0    Azelastine HCl 137 MCG/SPRAY SOLN INHALE INTO THE NOSTRIL TWO TIMES A DAY. USE IN EACH NOSTRIL AS DIRECTED 30 mL 1     No current facility-administered medications on file prior to visit.       I have reviewed all pertinent PMHx, PSHx, FamHx, SocialHx, medications, and allergies and updated history as appropriate.      Current Outpatient Medications on File Prior to Visit   Medication Sig Dispense Refill    furosemide (LASIX) 20 MG tablet Take 1 tablet by mouth daily 90 tablet 1    montelukast (SINGULAIR) 10 MG tablet TAKE 1 TABLET BY MOUTH EVERY DAY 30 tablet 5    famotidine (PEPCID) 20 MG tablet Take 1 tablet by mouth 2 times daily 180 tablet 1    lisinopril (PRINIVIL;ZESTRIL) 10 MG tablet Take 1 tablet by mouth daily 90 tablet 1    fluticasone (FLONASE) 50 MCG/ACT nasal spray 2 sprays by Each Nostril route daily 16 g 5    montelukast (SINGULAIR) 10 MG tablet Take 1 tablet by mouth daily 30 tablet 3    ipratropium (ATROVENT) 0.06 % nasal spray 2 sprays by Each Nostril route 4 times daily 15 mL 3    azelastine (ASTELIN) 0.1 % nasal spray 2 sprays by Nasal route 2 times daily Use in each nostril as directed 30 mL 1    fluticasone (FLONASE) 50 MCG/ACT nasal spray INHALE TWO SPRAYS INTO EACH NOSTRIL ONCE DAILY 1 each 2    carvedilol (COREG) 12.5 MG tablet Take 1 tablet by mouth 2 times daily (with meals) 90 tablet 3    Misc. Devices MISC 1 each by Does not apply route once as needed (mask for nebulizer) Nebulizer and tubing. 1 each 0    Azelastine HCl 137 MCG/SPRAY SOLN INHALE INTO THE NOSTRIL TWO TIMES A DAY. USE IN EACH

## 2025-06-16 ENCOUNTER — TELEPHONE (OUTPATIENT)
Age: 65
End: 2025-06-16

## 2025-06-16 NOTE — TELEPHONE ENCOUNTER
Please change order from US Screening for  AAA to Vascular AAA(IMG# ). Any questions please ask [place] for details.

## 2025-06-17 DIAGNOSIS — Z13.9 SCREENING DUE: Primary | ICD-10-CM

## 2025-06-20 ENCOUNTER — TELEPHONE (OUTPATIENT)
Dept: SURGERY | Age: 65
End: 2025-06-20

## 2025-06-20 ENCOUNTER — INITIAL CONSULT (OUTPATIENT)
Dept: SURGERY | Age: 65
End: 2025-06-20

## 2025-06-20 VITALS
HEART RATE: 92 BPM | HEIGHT: 75 IN | BODY MASS INDEX: 32.2 KG/M2 | RESPIRATION RATE: 18 BRPM | TEMPERATURE: 98 F | WEIGHT: 259 LBS | DIASTOLIC BLOOD PRESSURE: 93 MMHG | SYSTOLIC BLOOD PRESSURE: 158 MMHG

## 2025-06-20 DIAGNOSIS — Z12.11 ENCOUNTER FOR SCREENING COLONOSCOPY: Primary | ICD-10-CM

## 2025-06-20 DIAGNOSIS — Z12.11 SPECIAL SCREENING FOR MALIGNANT NEOPLASMS, COLON: ICD-10-CM

## 2025-06-20 RX ORDER — SODIUM CHLORIDE 9 MG/ML
INJECTION, SOLUTION INTRAVENOUS PRN
OUTPATIENT
Start: 2025-06-20

## 2025-06-20 RX ORDER — SODIUM CHLORIDE 0.9 % (FLUSH) 0.9 %
5-40 SYRINGE (ML) INJECTION EVERY 12 HOURS SCHEDULED
OUTPATIENT
Start: 2025-06-20

## 2025-06-20 RX ORDER — SODIUM CHLORIDE 0.9 % (FLUSH) 0.9 %
5-40 SYRINGE (ML) INJECTION PRN
OUTPATIENT
Start: 2025-06-20

## 2025-06-20 RX ORDER — SODIUM CHLORIDE 9 MG/ML
INJECTION, SOLUTION INTRAVENOUS CONTINUOUS
OUTPATIENT
Start: 2025-06-20

## 2025-06-20 NOTE — TELEPHONE ENCOUNTER
Per Dr. Martinez, patient scheduled for Colonoscopy at [facility] on 7/14/25. Surgery scheduling notified, surgeons calendar updated. Follow up appointment scheduled.  Mag.Citrate/Miralax prep instructions given.

## 2025-06-20 NOTE — PROGRESS NOTES
Terri Curry  2025  [facility]              History and Physical    Chief Complaint: Screening colonoscopy, Mom  of colon cancer, occasional rectal bleeding    Terri Curry is a 64 y.o., male who needs a colonoscopy, last one was in 2016. His Mom  of colon cancer. He has occasional rectal bleeding if he eats hot pepper but mostly regular with some diarrhea.    Past Medical History:   Diagnosis Date    Arthritis     Atrial fibrillation (HCC)     Bruising tendency     Cardiomyopathy (HCC)     probably viral, LV systolic dysfunction EF 20% 2011    Cerebral artery occlusion with cerebral infarction (HCC)     TIA    CHF (congestive heart failure) (HCC)     ef 20    Diabetes mellitus (HCC)     Family history of colon cancer 2014    Headache(784.0)     Heart trouble ,     Hospitalized (WHAT TYPE?)    History of blood transfusion     History of frequent ear infections     History of substance abuse (HCC) 10/6/2011    A. Abstinence since  B. H/O significant alcohol consumption     Hx of blood clots     HEART    Hyperlipidemia     Hypertension     Memory loss     Recurrent infections , ,     ASK WHERE?    Speech problem     Vertebrobasilar TIAs 10/15/2015    2012      Past Surgical History:   Procedure Laterality Date    A-V CARDIAC PACEMAKER INSERTION Left 2017    Dual ICD removed Dual chamber Medtronic Pacer insertion by Dr Chau    ABLATION OF DYSRHYTHMIC FOCUS  2013    aflutter ablation    CARDIAC DEFIBRILLATOR PLACEMENT  10/27/2011    Dr. Chau, dual chamber  Medtronic    CARDIOVERSION  2013    Cardioversion   Dr. Chau    COLONOSCOPY  2016    no polyps    SINUS SURGERY      polyps removed     TONSILLECTOMY      TRANSESOPHAGEAL ECHOCARDIOGRAM  2011         VASECTOMY  1998       Home Medications  Prior to Visit Medications    Medication Sig Taking? Authorizing Provider   atorvastatin (LIPITOR) 80 MG

## 2025-07-02 ENCOUNTER — HOSPITAL ENCOUNTER (OUTPATIENT)
Dept: ULTRASOUND IMAGING | Age: 65
Discharge: HOME | End: 2025-07-02
Payer: COMMERCIAL

## 2025-07-02 DIAGNOSIS — Z13.9 SCREENING DUE: ICD-10-CM

## 2025-07-02 PROCEDURE — 76706 US ABDL AORTA SCREEN AAA: CPT

## 2025-07-15 ENCOUNTER — TELEPHONE (OUTPATIENT)
Dept: SURGERY | Age: 65
End: 2025-07-15

## 2025-07-15 NOTE — TELEPHONE ENCOUNTER
Received message that patient no showed for 7/14/25 Colonoscopy. LVM for patient to contact office to reschedule procedure.

## 2025-07-20 PROBLEM — Z12.11 SPECIAL SCREENING FOR MALIGNANT NEOPLASMS, COLON: Status: RESOLVED | Noted: 2025-06-20 | Resolved: 2025-07-20

## 2025-07-22 ENCOUNTER — APPOINTMENT (OUTPATIENT)
Dept: CARDIOLOGY | Facility: HOSPITAL | Age: 65
End: 2025-07-22
Payer: COMMERCIAL

## 2025-07-24 ENCOUNTER — HOSPITAL ENCOUNTER (OUTPATIENT)
Dept: CARDIOLOGY | Facility: HOSPITAL | Age: 65
Discharge: HOME | End: 2025-07-24
Payer: COMMERCIAL

## 2025-07-24 DIAGNOSIS — Z95.0 PRESENCE OF CARDIAC PACEMAKER: ICD-10-CM

## 2025-07-24 DIAGNOSIS — I49.5 SINUS NODE DYSFUNCTION (MULTI): Primary | ICD-10-CM

## 2025-07-24 DIAGNOSIS — I48.0 PAROXYSMAL ATRIAL FIBRILLATION (MULTI): ICD-10-CM

## 2025-07-24 DIAGNOSIS — I49.5 SINUS NODE DYSFUNCTION (MULTI): ICD-10-CM

## 2025-07-24 DIAGNOSIS — I48.0 PAF (PAROXYSMAL ATRIAL FIBRILLATION) (MULTI): ICD-10-CM

## 2025-07-24 PROCEDURE — 93296 REM INTERROG EVL PM/IDS: CPT

## 2025-08-21 ENCOUNTER — PATIENT MESSAGE (OUTPATIENT)
Age: 65
End: 2025-08-21

## 2025-09-02 RX ORDER — MONTELUKAST SODIUM 10 MG/1
10 TABLET ORAL DAILY
Qty: 30 TABLET | Refills: 5 | Status: SHIPPED | OUTPATIENT
Start: 2025-09-02

## 2025-09-11 ENCOUNTER — APPOINTMENT (OUTPATIENT)
Dept: CARDIOLOGY | Facility: HOSPITAL | Age: 65
End: 2025-09-11
Payer: COMMERCIAL

## 2025-10-30 ENCOUNTER — APPOINTMENT (OUTPATIENT)
Dept: CARDIOLOGY | Facility: HOSPITAL | Age: 65
End: 2025-10-30
Payer: COMMERCIAL